# Patient Record
Sex: MALE | Race: WHITE | NOT HISPANIC OR LATINO | Employment: OTHER | URBAN - METROPOLITAN AREA
[De-identification: names, ages, dates, MRNs, and addresses within clinical notes are randomized per-mention and may not be internally consistent; named-entity substitution may affect disease eponyms.]

---

## 2017-02-22 ENCOUNTER — TELEPHONE (OUTPATIENT)
Dept: CARDIOLOGY | Facility: MEDICAL CENTER | Age: 62
End: 2017-02-22

## 2017-02-22 DIAGNOSIS — E78.5 DYSLIPIDEMIA: ICD-10-CM

## 2017-02-22 DIAGNOSIS — I10 ESSENTIAL HYPERTENSION, BENIGN: ICD-10-CM

## 2017-02-22 NOTE — TELEPHONE ENCOUNTER
----- Message from Eliza Rodriguez, Med Ass't sent at 2/21/2017 11:28 AM PST -----  Regarding: Lab order Request   Contact: [phone]  Patient of AM would like lab order sent prior to appointment on 3/9/17.    Thank you

## 2017-02-22 NOTE — TELEPHONE ENCOUNTER
----- Message from Eliza Rodriguez, Med Ass't sent at 2/21/2017 11:28 AM PST -----  Regarding: Lab order Request   Contact: [phone]  Patient of AM would like lab order sent prior to appointment on 3/9/17.    Thank you

## 2017-03-06 ENCOUNTER — HOSPITAL ENCOUNTER (OUTPATIENT)
Dept: LAB | Facility: MEDICAL CENTER | Age: 62
End: 2017-03-06
Attending: INTERNAL MEDICINE
Payer: COMMERCIAL

## 2017-03-06 DIAGNOSIS — E78.5 DYSLIPIDEMIA: ICD-10-CM

## 2017-03-06 DIAGNOSIS — I10 ESSENTIAL HYPERTENSION, BENIGN: ICD-10-CM

## 2017-03-06 LAB
ALBUMIN SERPL BCP-MCNC: 3.8 G/DL (ref 3.2–4.9)
ALBUMIN/GLOB SERPL: 1.3 G/DL
ALP SERPL-CCNC: 80 U/L (ref 30–99)
ALT SERPL-CCNC: 34 U/L (ref 2–50)
ANION GAP SERPL CALC-SCNC: 8 MMOL/L (ref 0–11.9)
AST SERPL-CCNC: 35 U/L (ref 12–45)
BILIRUB SERPL-MCNC: 1.1 MG/DL (ref 0.1–1.5)
BUN SERPL-MCNC: 17 MG/DL (ref 8–22)
CALCIUM SERPL-MCNC: 9.3 MG/DL (ref 8.4–10.2)
CHLORIDE SERPL-SCNC: 99 MMOL/L (ref 96–112)
CHOLEST SERPL-MCNC: 192 MG/DL (ref 100–199)
CO2 SERPL-SCNC: 31 MMOL/L (ref 20–33)
CREAT SERPL-MCNC: 0.92 MG/DL (ref 0.5–1.4)
GFR SERPL CREATININE-BSD FRML MDRD: >60 ML/MIN/1.73 M 2
GLOBULIN SER CALC-MCNC: 2.9 G/DL (ref 1.9–3.5)
GLUCOSE SERPL-MCNC: 124 MG/DL (ref 65–99)
HDLC SERPL-MCNC: 45 MG/DL
LDLC SERPL CALC-MCNC: 119 MG/DL
POTASSIUM SERPL-SCNC: 3.9 MMOL/L (ref 3.6–5.5)
PROT SERPL-MCNC: 6.7 G/DL (ref 6–8.2)
SODIUM SERPL-SCNC: 138 MMOL/L (ref 135–145)
TRIGL SERPL-MCNC: 142 MG/DL (ref 0–149)

## 2017-03-06 PROCEDURE — 36415 COLL VENOUS BLD VENIPUNCTURE: CPT

## 2017-03-06 PROCEDURE — 80061 LIPID PANEL: CPT

## 2017-03-06 PROCEDURE — 80053 COMPREHEN METABOLIC PANEL: CPT

## 2017-03-09 DIAGNOSIS — I10 ESSENTIAL HYPERTENSION, BENIGN: ICD-10-CM

## 2017-03-09 DIAGNOSIS — R55 SYNCOPE AND COLLAPSE: ICD-10-CM

## 2017-03-09 DIAGNOSIS — I77.810 ASCENDING AORTA DILATATION (HCC): ICD-10-CM

## 2017-03-24 ENCOUNTER — HOSPITAL ENCOUNTER (OUTPATIENT)
Dept: CARDIOLOGY | Facility: MEDICAL CENTER | Age: 62
End: 2017-03-24
Attending: INTERNAL MEDICINE
Payer: COMMERCIAL

## 2017-03-24 DIAGNOSIS — I77.810 ASCENDING AORTA DILATATION (HCC): ICD-10-CM

## 2017-03-24 LAB
LV EJECT FRACT  99904: 55
LV EJECT FRACT MOD 2C 99903: 54.09
LV EJECT FRACT MOD 4C 99902: 56.98
LV EJECT FRACT MOD BP 99901: 51.26

## 2017-03-24 PROCEDURE — 93306 TTE W/DOPPLER COMPLETE: CPT | Mod: 26 | Performed by: INTERNAL MEDICINE

## 2017-03-24 PROCEDURE — 93306 TTE W/DOPPLER COMPLETE: CPT

## 2017-05-26 ENCOUNTER — HOSPITAL ENCOUNTER (OUTPATIENT)
Dept: LAB | Facility: MEDICAL CENTER | Age: 62
End: 2017-05-26
Attending: INTERNAL MEDICINE
Payer: COMMERCIAL

## 2017-05-26 ENCOUNTER — OFFICE VISIT (OUTPATIENT)
Dept: CARDIOLOGY | Facility: MEDICAL CENTER | Age: 62
End: 2017-05-26
Payer: COMMERCIAL

## 2017-05-26 VITALS
HEART RATE: 62 BPM | WEIGHT: 229 LBS | OXYGEN SATURATION: 96 % | BODY MASS INDEX: 30.35 KG/M2 | DIASTOLIC BLOOD PRESSURE: 78 MMHG | HEIGHT: 73 IN | SYSTOLIC BLOOD PRESSURE: 140 MMHG

## 2017-05-26 DIAGNOSIS — R73.09 ELEVATED GLUCOSE: ICD-10-CM

## 2017-05-26 DIAGNOSIS — E78.5 DYSLIPIDEMIA: ICD-10-CM

## 2017-05-26 DIAGNOSIS — I10 ESSENTIAL HYPERTENSION, BENIGN: ICD-10-CM

## 2017-05-26 PROCEDURE — 99214 OFFICE O/P EST MOD 30 MIN: CPT | Performed by: INTERNAL MEDICINE

## 2017-05-26 PROCEDURE — 36415 COLL VENOUS BLD VENIPUNCTURE: CPT

## 2017-05-26 PROCEDURE — 83036 HEMOGLOBIN GLYCOSYLATED A1C: CPT

## 2017-05-26 RX ORDER — AMLODIPINE BESYLATE 10 MG/1
10 TABLET ORAL DAILY
Qty: 30 TAB | Refills: 11 | Status: SHIPPED | OUTPATIENT
Start: 2017-05-26 | End: 2017-09-06

## 2017-05-26 ASSESSMENT — ENCOUNTER SYMPTOMS
ORTHOPNEA: 0
LOSS OF CONSCIOUSNESS: 0
SHORTNESS OF BREATH: 0
BLURRED VISION: 0
MYALGIAS: 0
DEPRESSION: 0
PND: 0
NERVOUS/ANXIOUS: 0
BRUISES/BLEEDS EASILY: 0
PALPITATIONS: 0
SPEECH CHANGE: 0
ABDOMINAL PAIN: 0
CLAUDICATION: 0
FEVER: 0

## 2017-05-26 NOTE — MR AVS SNAPSHOT
"        Sanya Miranda   2017 2:00 PM   Office Visit   MRN: [id number]    Department:  [facility]   Dept Phone:  [phone]    Description:  Male : 1955   Provider:  Anabel Espinal M.D.           Reason for Visit     Follow-Up           Allergies as of 2017     Allergen Noted Reactions    Sulfa Drugs 10/29/2008   Hives      You were diagnosed with     Essential hypertension, benign   [401.1.ICD-9-CM]       Elevated glucose   [891063]       Dyslipidemia   [516304]         Vital Signs     Blood Pressure Pulse Height Weight Body Mass Index Oxygen Saturation    140/78 mmHg 62 1.854 m (6' 1\") 103.874 kg (229 lb) 30.22 kg/m2 96%    Smoking Status                   Never Smoker            Basic Information     Date Of Birth Sex Race Ethnicity Preferred Language    1955 Male White Non- English      Problem List              ICD-10-CM Priority Class Noted - Resolved    Essential hypertension, benign I10   2016 - Present    Syncope and collapse R55   3/9/2017 - Present      Health Maintenance        Date Due Completion Dates    IMM DTaP/Tdap/Td Vaccine (1 - Tdap) 1974 ---    COLONOSCOPY 2005 ---    IMM ZOSTER VACCINE 2015 ---            Current Immunizations     Influenza Vaccine Quad Inj (Pf) 2016  9:21 AM, 10/19/2015 10:06 AM    Influenza Vaccine Quad Inj (Preserved) 10/23/2014      Below and/or attached are the medications your provider expects you to take. Review all of your home medications and newly ordered medications with your provider and/or pharmacist. Follow medication instructions as directed by your provider and/or pharmacist. Please keep your medication list with you and share with your provider. Update the information when medications are discontinued, doses are changed, or new medications (including over-the-counter products) are added; and carry medication information at all times in the event of emergency situations     Allergies:  SULFA DRUGS - " Hives               Medications  Valid as of: May 26, 2017 -  2:39 PM    Generic Name Brand Name Tablet Size Instructions for use    AmLODIPine Besylate (Tab) NORVASC 10 MG Take 1 Tab by mouth every day.        Aspirin (Tab) aspirin 81 MG Take 81 mg by mouth every day.        HydroCHLOROthiazide (Tab) HYDRODIURIL 25 MG Take 1 Tab by mouth every day. Needs to be seen for further refills. Thank you        Niacin (Antihyperlipidemic) (Tab CR) NIASPAN 1000 MG Take 1000 mg by mouth 2 Times a Day.         Potassium Chloride (Tab CR) KLOR-CON 10 MEQ Take 1 Tab by mouth 2 times a day.        Ramipril (Cap) ALTACE 10 MG Take 10 mg by mouth 2 Times a Day.        .                 Medicines prescribed today were sent to:     [address] [facility]    [address] [facility] [address]    Phone: [phone] Fax: [phone]    Open 24 Hours?: No      Medication refill instructions:       If your prescription bottle indicates you have medication refills left, it is not necessary to call your provider’s office. Please contact your pharmacy and they will refill your medication.    If your prescription bottle indicates you do not have any refills left, you may request refills at any time through one of the following ways: The online Page2Images system (except Urgent Care), by calling your provider’s office, or by asking your pharmacy to contact your provider’s office with a refill request. Medication refills are processed only during regular business hours and may not be available until the next business day. Your provider may request additional information or to have a follow-up visit with you prior to refilling your medication.   *Please Note: Medication refills are assigned a new Rx number when refilled electronically. Your pharmacy may indicate that no refills were authorized even though a new prescription for the same medication is available at the pharmacy. Please request the medicine by  name with the pharmacy before contacting your provider for a refill.        Your To Do List     Future Labs/Procedures Complete By Expires    COMP METABOLIC PANEL  As directed 5/26/2018    HEMOGLOBIN A1C (Glycohemoglobin GHB Total/A1C with MBG Estimate)  As directed 5/26/2018    LIPID PROFILE  As directed 5/26/2018         BabyBus Access Code: [email or web address]  Expires: 6/2/2017 12:58 PM    BabyBus  A secure, online tool to manage your health information     [employer]’s BabyBus® is a secure, online tool that connects you to your personalized health information from the privacy of your home -- day or night - making it very easy for you to manage your healthcare. Once the activation process is completed, you can even access your medical information using the BabyBus lauren, which is available for free in the Apple Lauren store or Google Play store.     BabyBus provides the following levels of access (as shown below):   My Chart Features   [facility] Primary Care Doctor [facility]  Specialists [facility]  Urgent  [facility]  Primary Care  Doctor   Email your healthcare team securely and privately 24/7 X X X    Manage appointments: schedule your next appointment; view details of past/upcoming appointments X      Request prescription refills. X      View recent personal medical records, including lab and immunizations X X X X   View health record, including health history, allergies, medications X X X X   Read reports about your outpatient visits, procedures, consult and ER notes X X X X   See your discharge summary, which is a recap of your hospital and/or ER visit that includes your diagnosis, lab results, and care plan. X X       How to register for BabyBus:  1. Go to  [email or web address].  2. Click on the Sign Up Now box, which takes you to the New Member Sign Up page. You will need to provide the following information:  a. Enter your BabyBus Access Code exactly as it appears at the top of this page. (You will not  need to use this code after you’ve completed the sign-up process. If you do not sign up before the expiration date, you must request a new code.)   b. Enter your date of birth.   c. Enter your home email address.   d. Click Submit, and follow the next screen’s instructions.  3. Create a [email or web address] ID. This will be your [email or web address] login ID and cannot be changed, so think of one that is secure and easy to remember.  4. Create a [employer] password. You can change your password at any time.  5. Enter your Password Reset Question and Answer. This can be used at a later time if you forget your password.   6. Enter your e-mail address. This allows you to receive e-mail notifications when new information is available in [employer].  7. Click Sign Up. You can now view your health information.    For assistance activating your [employer] account, call [phone]

## 2017-05-26 NOTE — PROGRESS NOTES
Subjective:   Sanya Miranda is a pleasant 60-year-old gentleman with known history of dyslipidemia, hypertension presenting today for follow-up evaluation of hypertension.    She still continues to swim denied any further episodes of syncope. Patient wants to consolidates his medications. Denied any complaints of exertional chest pain, pressure or tightness. No complaints of palpitations orthopnea or PND. Denied any complaints of lower extremity edema or claudication.  Past Medical History   Diagnosis Date   • Hypertension      History reviewed. No pertinent past surgical history.  Family History   Problem Relation Age of Onset   • Heart Disease Mother      CABG at age late 60   • Hypertension Mother    • Heart Disease Father      CABG at age 83   • Hypertension Father      History   Smoking status   • Never Smoker    Smokeless tobacco   • Never Used     Allergies   Allergen Reactions   • Sulfa Drugs Hives     Outpatient Encounter Prescriptions as of 5/26/2017   Medication Sig Dispense Refill   • hydrochlorothiazide (HYDRODIURIL) 25 MG Tab Take 1 Tab by mouth every day. Needs to be seen for further refills. Thank you 30 Tab 11   • potassium chloride ER (KLOR-CON) 10 MEQ tablet Take 1 Tab by mouth 2 times a day. 60 Tab 11   • ramipril (ALTACE) 10 MG capsule Take 10 mg by mouth 2 Times a Day.     • aspirin 81 MG tablet Take 81 mg by mouth every day.     • NIASPAN 1000 MG PO TBCR Take 1000 mg by mouth 2 Times a Day.        No facility-administered encounter medications on file as of 5/26/2017.     Review of Systems   Constitutional: Negative for fever.   HENT: Negative for congestion.    Eyes: Negative for blurred vision.   Respiratory: Negative for shortness of breath.    Cardiovascular: Negative for chest pain, palpitations, orthopnea, claudication, leg swelling and PND.   Gastrointestinal: Negative for abdominal pain.   Musculoskeletal: Negative for myalgias and joint pain.   Skin: Negative for rash.  "  Neurological: Negative for speech change and loss of consciousness.   Endo/Heme/Allergies: Does not bruise/bleed easily.   Psychiatric/Behavioral: Negative for depression. The patient is not nervous/anxious.    All other systems reviewed and are negative.       Objective:   /78 mmHg  Pulse 62  Ht 1.854 m (6' 1\")  Wt 103.874 kg (229 lb)  BMI 30.22 kg/m2  SpO2 96%    Physical Exam   Constitutional: He is oriented to person, place, and time. He appears well-developed. No distress.   HENT:   Mouth/Throat: Mucous membranes are normal.   Eyes: Conjunctivae and EOM are normal.   Neck: No JVD present. No tracheal deviation present. No thyroid mass present.   Cardiovascular: Normal rate and regular rhythm.    No murmur heard.  Pulses:       Carotid pulses are 2+ on the right side, and 2+ on the left side.       Radial pulses are 2+ on the right side, and 2+ on the left side.        Dorsalis pedis pulses are 2+ on the right side, and 2+ on the left side.        Posterior tibial pulses are 2+ on the right side, and 2+ on the left side.   Pulmonary/Chest: Effort normal and breath sounds normal. No respiratory distress. He exhibits no tenderness.   Abdominal: Soft. There is no tenderness.   Musculoskeletal: Normal range of motion. He exhibits no edema.   Neurological: He is alert and oriented to person, place, and time. He has normal strength. He displays no tremor.   Skin: Skin is warm and dry. He is not diaphoretic.   Psychiatric: He has a normal mood and affect. His behavior is normal.   Vitals reviewed.     Transthoracic echo: 3/24/17  Normal left ventricular chamber size. Mild left ventricular septal hypertrophy. Left ventricular ejection fraction is visually estimated to be 55%.  Grade I diastolic dysfunction.  Normal right ventricular size and systolic function.  Prolapse of the posterior mitral leaflet was present.  Trace tricuspid regurgitation.  Normal pericardium without effusion.  Dilated ascending aorta " at 3.9 cm    Results for MAGO ARMAS (MRN [id number]) as of 5/26/2017 16:53   2/17/2016  3/6/2017    Sodium 143 138   Potassium 3.9 3.9   Chloride 100 99   Co2 28 31   Anion Gap  8.0   Glucose 107 (H) 124 (H)   Bun 18 17   Creatinine 0.84 0.92   GFR If Non African American 95 >60   Bun-Creatinine Ratio 21    Calcium 9.3 9.3   AST(SGOT)  35   ALT(SGPT)  34   Alkaline Phosphatase  80   Cholesterol,Tot  192   Triglycerides  142   HDL  45   LDL  119 (H)     Event monitor: 1/1/16   No significant arrhythmias pauses noted underlying rhythm was sinus.    Stress echo: 12/17/15  Negative stress echocardiogram.  No evidence of ischemia or infarct.  Normal resting left ventricular systolic function with ejection   fraction of 60%.  Fair exercise tolerance.  Resting EKG shows normal sinus rhythm with incomplete right bundle   branch block.  Non-specific ST changes with stress.  No arrhythmias noted    TTE: 12/10/15  No prior study is available for comparison.   Left ventricular ejection fraction is visually estimated to be 65%.   Normal regional wall motion. Grade I diastolic dysfunction.  No significant valve abnormalities.   Unable to estimate pulmonary artery pressure due to an inadequate   tricuspid regurgitant jet.   Ascending aorta diameter is 3.7 cm    EKG: 10/1/15  Sinus rhythm, right bundle-branch block    Labs: 11/24/15  Glucose 113, BUN 20, creatinine 0.92, sodium 141, potassium 4.1, chloride 100, bicarbonate 26  Gómez is 94, AST 23, ALT 22  Assessment:     1. Dyslipidemia  2. Hypertension  3. Borderline dilated ascending aorta    Medical Decision Making:  Today's Assessment / Status / Plan:     1. Dietary modification and exercise.  2. Per patient request will try to consolidate antihypertensive agents.  Continue ramipril 10 mg daily.  Discontinue hydrochlorothiazide and potassium chloride.  Start Norvasc 5 mg daily if systolic blood pressure more than 140 increase Norvasc to 10 mg daily.  If patient develops  lower extremity edema while on Norvasc advised him to discontinue Norvasc and go back to hydrochlorothiazide and potassium chloride.  3. Repeat echocardiogram in 2 years.    Follow-up in one year.  Labs prior to next visit.    Thank you for allowing me to participate in taking care of patient.    Anabel Saenz MD.

## 2017-05-26 NOTE — Clinical Note
[facility]   [address]   [address]  [place] [address]  Phone: [phone]  Fax: [phone]              Sanya Miranda  1955    Encounter Date: 5/26/2017    Anabel Espinal M.D.          PROGRESS NOTE:  Subjective:   Sanya Miranda is a pleasant 60-year-old gentleman with known history of dyslipidemia, hypertension presenting today for follow-up evaluation of hypertension.    She still continues to swim denied any further episodes of syncope. Patient wants to consolidates his medications. Denied any complaints of exertional chest pain, pressure or tightness. No complaints of palpitations orthopnea or PND. Denied any complaints of lower extremity edema or claudication.  Past Medical History   Diagnosis Date   • Hypertension      History reviewed. No pertinent past surgical history.  Family History   Problem Relation Age of Onset   • Heart Disease Mother      CABG at age late 60   • Hypertension Mother    • Heart Disease Father      CABG at age 83   • Hypertension Father      History   Smoking status   • Never Smoker    Smokeless tobacco   • Never Used     Allergies   Allergen Reactions   • Sulfa Drugs Hives     Outpatient Encounter Prescriptions as of 5/26/2017   Medication Sig Dispense Refill   • hydrochlorothiazide (HYDRODIURIL) 25 MG Tab Take 1 Tab by mouth every day. Needs to be seen for further refills. Thank you 30 Tab 11   • potassium chloride ER (KLOR-CON) 10 MEQ tablet Take 1 Tab by mouth 2 times a day. 60 Tab 11   • ramipril (ALTACE) 10 MG capsule Take 10 mg by mouth 2 Times a Day.     • aspirin 81 MG tablet Take 81 mg by mouth every day.     • NIASPAN 1000 MG PO TBCR Take 1000 mg by mouth 2 Times a Day.        No facility-administered encounter medications on file as of 5/26/2017.     Review of Systems   Constitutional: Negative for fever.   HENT: Negative for congestion.    Eyes: Negative for blurred vision.    "  Respiratory: Negative for shortness of breath.    Cardiovascular: Negative for chest pain, palpitations, orthopnea, claudication, leg swelling and PND.   Gastrointestinal: Negative for abdominal pain.   Musculoskeletal: Negative for myalgias and joint pain.   Skin: Negative for rash.   Neurological: Negative for speech change and loss of consciousness.   Endo/Heme/Allergies: Does not bruise/bleed easily.   Psychiatric/Behavioral: Negative for depression. The patient is not nervous/anxious.    All other systems reviewed and are negative.       Objective:   /78 mmHg  Pulse 62  Ht 1.854 m (6' 1\")  Wt 103.874 kg (229 lb)  BMI 30.22 kg/m2  SpO2 96%    Physical Exam   Constitutional: He is oriented to person, place, and time. He appears well-developed. No distress.   HENT:   Mouth/Throat: Mucous membranes are normal.   Eyes: Conjunctivae and EOM are normal.   Neck: No JVD present. No tracheal deviation present. No thyroid mass present.   Cardiovascular: Normal rate and regular rhythm.    No murmur heard.  Pulses:       Carotid pulses are 2+ on the right side, and 2+ on the left side.       Radial pulses are 2+ on the right side, and 2+ on the left side.        Dorsalis pedis pulses are 2+ on the right side, and 2+ on the left side.        Posterior tibial pulses are 2+ on the right side, and 2+ on the left side.   Pulmonary/Chest: Effort normal and breath sounds normal. No respiratory distress. He exhibits no tenderness.   Abdominal: Soft. There is no tenderness.   Musculoskeletal: Normal range of motion. He exhibits no edema.   Neurological: He is alert and oriented to person, place, and time. He has normal strength. He displays no tremor.   Skin: Skin is warm and dry. He is not diaphoretic.   Psychiatric: He has a normal mood and affect. His behavior is normal.   Vitals reviewed.     Transthoracic echo: 3/24/17  Normal left ventricular chamber size. Mild left ventricular septal hypertrophy. Left ventricular " ejection fraction is visually estimated to be 55%.  Grade I diastolic dysfunction.  Normal right ventricular size and systolic function.  Prolapse of the posterior mitral leaflet was present.  Trace tricuspid regurgitation.  Normal pericardium without effusion.  Dilated ascending aorta at 3.9 cm    Results for MAGO ARMAS (MRN [id number]) as of 5/26/2017 16:53   2/17/2016  3/6/2017    Sodium 143 138   Potassium 3.9 3.9   Chloride 100 99   Co2 28 31   Anion Gap  8.0   Glucose 107 (H) 124 (H)   Bun 18 17   Creatinine 0.84 0.92   GFR If Non African American 95 >60   Bun-Creatinine Ratio 21    Calcium 9.3 9.3   AST(SGOT)  35   ALT(SGPT)  34   Alkaline Phosphatase  80   Cholesterol,Tot  192   Triglycerides  142   HDL  45   LDL  119 (H)     Event monitor: 1/1/16   No significant arrhythmias pauses noted underlying rhythm was sinus.    Stress echo: 12/17/15  Negative stress echocardiogram.  No evidence of ischemia or infarct.  Normal resting left ventricular systolic function with ejection   fraction of 60%.  Fair exercise tolerance.  Resting EKG shows normal sinus rhythm with incomplete right bundle   branch block.  Non-specific ST changes with stress.  No arrhythmias noted    TTE: 12/10/15  No prior study is available for comparison.   Left ventricular ejection fraction is visually estimated to be 65%.   Normal regional wall motion. Grade I diastolic dysfunction.  No significant valve abnormalities.   Unable to estimate pulmonary artery pressure due to an inadequate   tricuspid regurgitant jet.   Ascending aorta diameter is 3.7 cm    EKG: 10/1/15  Sinus rhythm, right bundle-branch block    Labs: 11/24/15  Glucose 113, BUN 20, creatinine 0.92, sodium 141, potassium 4.1, chloride 100, bicarbonate 26  Gómez is 94, AST 23, ALT 22  Assessment:     1. Dyslipidemia  2. Hypertension  3. Borderline dilated ascending aorta    Medical Decision Making:  Today's Assessment / Status / Plan:     1. Dietary modification and  exercise.  2. Per patient request will try to consolidate antihypertensive agents.  Continue ramipril 10 mg daily.  Discontinue hydrochlorothiazide and potassium chloride.  Start Norvasc 5 mg daily if systolic blood pressure more than 140 increase Norvasc to 10 mg daily.  If patient develops lower extremity edema while on Norvasc advised him to discontinue Norvasc and go back to hydrochlorothiazide and potassium chloride.  3. Repeat echocardiogram in 2 years.    Follow-up in one year.  Labs prior to next visit.    Thank you for allowing me to participate in taking care of patient.    Anabel Saenz MD.      Aurelio ASHFORD M.D.  [address] [facility] [address]  VIA Facsimile: [phone]

## 2017-05-27 LAB
EST. AVERAGE GLUCOSE BLD GHB EST-MCNC: 114 MG/DL
HBA1C MFR BLD: 5.6 % (ref 0–5.6)

## 2017-06-12 ENCOUNTER — TELEPHONE (OUTPATIENT)
Dept: CARDIOLOGY | Facility: MEDICAL CENTER | Age: 62
End: 2017-06-12

## 2017-06-12 NOTE — TELEPHONE ENCOUNTER
----- Message from Bety Bonner L.P.N. sent at 6/12/2017 11:19 AM PDT -----      ----- Message -----     From: Anabel Espinal M.D.     Sent: 6/12/2017   6:57 AM       To: Nicolette Jackson R.N.    Hemoglobin A1c is 5.6 right at the cut off  ----- Message -----     From: Nicolette Jackson R.N.     Sent: 5/31/2017  11:14 AM       To: Anabel Espinal M.D.    Saw in office 5/26, no FU scheduled

## 2017-06-20 ENCOUNTER — TELEPHONE (OUTPATIENT)
Dept: CARDIOLOGY | Facility: MEDICAL CENTER | Age: 62
End: 2017-06-20

## 2017-06-20 NOTE — TELEPHONE ENCOUNTER
----- Message from Anabel Espinal M.D. sent at 6/20/2017 11:36 AM PDT -----  Regarding: RE: patient having swelling issues from Amlodipine  Yes  Anabel  ----- Message -----     From: Wicho Decker     Sent: 6/20/2017  10:45 AM       To: Anabel Espinal M.D.  Subject: RE: patient having swelling issues from Amlo#    I left this patient a message because he should actually be taking his ramipril based on your last note and he was to stop the HCTZ and only restart if the amlodipine caused swelling so I just need him to clarify what he is and isn't taking.  If he is taking ramipril I will have him go back on the HCTZ with potassium and have him stop the amlodipine - that ok?     ----- Message -----     From: Homa Patino     Sent: 6/19/2017   4:33 PM       To: Wicho Decker  Subject: patient having swelling issues from Amlodipi#    AM/Sailajaezy      Patient says his ankles are swelling from the Amlodipine, and he wants to go back on the Ramipril. He's going out of town on Wednesday and would like to get this taken care of before he leaves town. He can be reached at [phone].

## 2017-06-20 NOTE — TELEPHONE ENCOUNTER
Spoke with patient who states he has been having BLE swelling since last week that just progressively got worse over the weekend.  Patient misunderstood instructions from last appointment and stopped his ramipril as well as HCTZ and was only taking amlodipine.  He stopped amlodipine yesterday and restarted both ramipril and HCTZ and I asked him to call me Friday morning and let me know how he is feeling.  He is also checking his BP and said he has had no issues with it in terms of fluctuating too high or too low but did not have actual numbers.  He will call back before Friday if any problems arise or he has any other questions/concerns. AM aware of situation

## 2017-08-15 DIAGNOSIS — I10 ESSENTIAL HYPERTENSION: ICD-10-CM

## 2017-08-15 RX ORDER — POTASSIUM CHLORIDE 750 MG/1
TABLET, FILM COATED, EXTENDED RELEASE ORAL
Qty: 60 TAB | Refills: 11 | Status: SHIPPED | OUTPATIENT
Start: 2017-08-15 | End: 2018-09-10 | Stop reason: SDUPTHER

## 2017-09-06 ENCOUNTER — HOSPITAL ENCOUNTER (INPATIENT)
Facility: MEDICAL CENTER | Age: 62
LOS: 1 days | DRG: 242 | End: 2017-09-07
Attending: EMERGENCY MEDICINE | Admitting: INTERNAL MEDICINE
Payer: COMMERCIAL

## 2017-09-06 ENCOUNTER — RESOLUTE PROFESSIONAL BILLING HOSPITAL PROF FEE (OUTPATIENT)
Dept: HOSPITALIST | Facility: MEDICAL CENTER | Age: 62
End: 2017-09-06
Payer: COMMERCIAL

## 2017-09-06 ENCOUNTER — APPOINTMENT (OUTPATIENT)
Dept: RADIOLOGY | Facility: MEDICAL CENTER | Age: 62
DRG: 242 | End: 2017-09-06
Attending: INTERNAL MEDICINE
Payer: COMMERCIAL

## 2017-09-06 ENCOUNTER — APPOINTMENT (OUTPATIENT)
Dept: RADIOLOGY | Facility: MEDICAL CENTER | Age: 62
DRG: 242 | End: 2017-09-06
Attending: EMERGENCY MEDICINE
Payer: COMMERCIAL

## 2017-09-06 DIAGNOSIS — I45.9 HEART BLOCK: ICD-10-CM

## 2017-09-06 PROBLEM — R73.9 HYPERGLYCEMIA: Status: ACTIVE | Noted: 2017-09-06

## 2017-09-06 PROBLEM — I44.2 AV BLOCK, 3RD DEGREE (HCC): Status: ACTIVE | Noted: 2017-09-06

## 2017-09-06 PROBLEM — J96.01 ACUTE RESPIRATORY FAILURE WITH HYPOXIA (HCC): Status: ACTIVE | Noted: 2017-09-06

## 2017-09-06 PROBLEM — E87.6 HYPOKALEMIA: Status: ACTIVE | Noted: 2017-09-06

## 2017-09-06 LAB
ALBUMIN SERPL BCP-MCNC: 3.6 G/DL (ref 3.2–4.9)
ALBUMIN/GLOB SERPL: 1.5 G/DL
ALP SERPL-CCNC: 68 U/L (ref 30–99)
ALT SERPL-CCNC: 23 U/L (ref 2–50)
ANION GAP SERPL CALC-SCNC: 8 MMOL/L (ref 0–11.9)
APTT PPP: 25.8 SEC (ref 24.7–36)
AST SERPL-CCNC: 33 U/L (ref 12–45)
BASE EXCESS BLDA CALC-SCNC: 1 MMOL/L (ref -4–3)
BASOPHILS # BLD AUTO: 0.1 % (ref 0–1.8)
BASOPHILS # BLD: 0.01 K/UL (ref 0–0.12)
BILIRUB SERPL-MCNC: 1 MG/DL (ref 0.1–1.5)
BNP SERPL-MCNC: 18 PG/ML (ref 0–100)
BODY TEMPERATURE: ABNORMAL DEGREES
BUN SERPL-MCNC: 16 MG/DL (ref 8–22)
CALCIUM SERPL-MCNC: 8.7 MG/DL (ref 8.5–10.5)
CHLORIDE SERPL-SCNC: 106 MMOL/L (ref 96–112)
CO2 BLDA-SCNC: 28 MMOL/L (ref 20–33)
CO2 SERPL-SCNC: 24 MMOL/L (ref 20–33)
CREAT SERPL-MCNC: 0.79 MG/DL (ref 0.5–1.4)
EKG IMPRESSION: NORMAL
EKG IMPRESSION: NORMAL
EOSINOPHIL # BLD AUTO: 0.01 K/UL (ref 0–0.51)
EOSINOPHIL NFR BLD: 0.1 % (ref 0–6.9)
ERYTHROCYTE [DISTWIDTH] IN BLOOD BY AUTOMATED COUNT: 38.8 FL (ref 35.9–50)
GFR SERPL CREATININE-BSD FRML MDRD: >60 ML/MIN/1.73 M 2
GLOBULIN SER CALC-MCNC: 2.4 G/DL (ref 1.9–3.5)
GLUCOSE BLD-MCNC: 105 MG/DL (ref 65–99)
GLUCOSE BLD-MCNC: 121 MG/DL (ref 65–99)
GLUCOSE BLD-MCNC: 125 MG/DL (ref 65–99)
GLUCOSE SERPL-MCNC: 150 MG/DL (ref 65–99)
GRAM STN SPEC: NORMAL
HCO3 BLDA-SCNC: 27 MMOL/L (ref 17–25)
HCT VFR BLD AUTO: 46 % (ref 42–52)
HGB BLD-MCNC: 16.5 G/DL (ref 14–18)
IMM GRANULOCYTES # BLD AUTO: 0.05 K/UL (ref 0–0.11)
IMM GRANULOCYTES NFR BLD AUTO: 0.5 % (ref 0–0.9)
INR PPP: 1.03 (ref 0.87–1.13)
LYMPHOCYTES # BLD AUTO: 0.65 K/UL (ref 1–4.8)
LYMPHOCYTES NFR BLD: 6.8 % (ref 22–41)
MCH RBC QN AUTO: 30.8 PG (ref 27–33)
MCHC RBC AUTO-ENTMCNC: 35.9 G/DL (ref 33.7–35.3)
MCV RBC AUTO: 86 FL (ref 81.4–97.8)
MONOCYTES # BLD AUTO: 0.21 K/UL (ref 0–0.85)
MONOCYTES NFR BLD AUTO: 2.2 % (ref 0–13.4)
NEUTROPHILS # BLD AUTO: 8.58 K/UL (ref 1.82–7.42)
NEUTROPHILS NFR BLD: 90.3 % (ref 44–72)
NRBC # BLD AUTO: 0 K/UL
NRBC BLD AUTO-RTO: 0 /100 WBC
O2/TOTAL GAS SETTING VFR VENT: 40 %
PCO2 BLDA: 45.9 MMHG (ref 26–37)
PH BLDA: 7.38 [PH] (ref 7.4–7.5)
PLATELET # BLD AUTO: 198 K/UL (ref 164–446)
PMV BLD AUTO: 10.1 FL (ref 9–12.9)
PO2 BLDA: 74 MMHG (ref 64–87)
POTASSIUM SERPL-SCNC: 3.3 MMOL/L (ref 3.6–5.5)
PROT SERPL-MCNC: 6 G/DL (ref 6–8.2)
PROTHROMBIN TIME: 13.8 SEC (ref 12–14.6)
RBC # BLD AUTO: 5.35 M/UL (ref 4.7–6.1)
RHODAMINE-AURAMINE STN SPEC: NORMAL
SAO2 % BLDA: 94 % (ref 93–99)
SIGNIFICANT IND 70042: NORMAL
SIGNIFICANT IND 70042: NORMAL
SITE SITE: NORMAL
SITE SITE: NORMAL
SODIUM SERPL-SCNC: 138 MMOL/L (ref 135–145)
SOURCE SOURCE: NORMAL
SOURCE SOURCE: NORMAL
SPECIMEN DRAWN FROM PATIENT: ABNORMAL
TROPONIN I SERPL-MCNC: 0.05 NG/ML (ref 0–0.04)
WBC # BLD AUTO: 9.5 K/UL (ref 4.8–10.8)

## 2017-09-06 PROCEDURE — 99152 MOD SED SAME PHYS/QHP 5/>YRS: CPT

## 2017-09-06 PROCEDURE — 87116 MYCOBACTERIA CULTURE: CPT

## 2017-09-06 PROCEDURE — 71010 DX-CHEST-PORTABLE (1 VIEW): CPT

## 2017-09-06 PROCEDURE — 82962 GLUCOSE BLOOD TEST: CPT | Mod: 91

## 2017-09-06 PROCEDURE — 99291 CRITICAL CARE FIRST HOUR: CPT | Performed by: INTERNAL MEDICINE

## 2017-09-06 PROCEDURE — 82803 BLOOD GASES ANY COMBINATION: CPT

## 2017-09-06 PROCEDURE — 5A1935Z RESPIRATORY VENTILATION, LESS THAN 24 CONSECUTIVE HOURS: ICD-10-PCS | Performed by: HOSPITALIST

## 2017-09-06 PROCEDURE — 87205 SMEAR GRAM STAIN: CPT

## 2017-09-06 PROCEDURE — 36415 COLL VENOUS BLD VENIPUNCTURE: CPT

## 2017-09-06 PROCEDURE — A9270 NON-COVERED ITEM OR SERVICE: HCPCS | Performed by: INTERNAL MEDICINE

## 2017-09-06 PROCEDURE — 96368 THER/DIAG CONCURRENT INF: CPT

## 2017-09-06 PROCEDURE — 02HK3JZ INSERTION OF PACEMAKER LEAD INTO RIGHT VENTRICLE, PERCUTANEOUS APPROACH: ICD-10-PCS | Performed by: INTERNAL MEDICINE

## 2017-09-06 PROCEDURE — 93005 ELECTROCARDIOGRAM TRACING: CPT | Performed by: EMERGENCY MEDICINE

## 2017-09-06 PROCEDURE — 700117 HCHG RX CONTRAST REV CODE 255: Performed by: INTERNAL MEDICINE

## 2017-09-06 PROCEDURE — 303105 HCHG CATHETER EXTRA

## 2017-09-06 PROCEDURE — C1894 INTRO/SHEATH, NON-LASER: HCPCS

## 2017-09-06 PROCEDURE — 302978 HCHG BRONCHOSCOPY-DIAGNOSTIC

## 2017-09-06 PROCEDURE — 94760 N-INVAS EAR/PLS OXIMETRY 1: CPT

## 2017-09-06 PROCEDURE — 99291 CRITICAL CARE FIRST HOUR: CPT

## 2017-09-06 PROCEDURE — 93010 ELECTROCARDIOGRAM REPORT: CPT | Performed by: INTERNAL MEDICINE

## 2017-09-06 PROCEDURE — 304853 HCHG PPM TEST CABLE

## 2017-09-06 PROCEDURE — 83880 ASSAY OF NATRIURETIC PEPTIDE: CPT

## 2017-09-06 PROCEDURE — 94150 VITAL CAPACITY TEST: CPT

## 2017-09-06 PROCEDURE — 0BCM8ZZ EXTIRPATION OF MATTER FROM BILATERAL LUNGS, VIA NATURAL OR ARTIFICIAL OPENING ENDOSCOPIC: ICD-10-PCS | Performed by: INTERNAL MEDICINE

## 2017-09-06 PROCEDURE — 85610 PROTHROMBIN TIME: CPT

## 2017-09-06 PROCEDURE — C1785 PMKR, DUAL, RATE-RESP: HCPCS

## 2017-09-06 PROCEDURE — 700111 HCHG RX REV CODE 636 W/ 250 OVERRIDE (IP)

## 2017-09-06 PROCEDURE — 700102 HCHG RX REV CODE 250 W/ 637 OVERRIDE(OP): Performed by: INTERNAL MEDICINE

## 2017-09-06 PROCEDURE — 02H63JZ INSERTION OF PACEMAKER LEAD INTO RIGHT ATRIUM, PERCUTANEOUS APPROACH: ICD-10-PCS | Performed by: INTERNAL MEDICINE

## 2017-09-06 PROCEDURE — C1898 LEAD, PMKR, OTHER THAN TRANS: HCPCS

## 2017-09-06 PROCEDURE — 94002 VENT MGMT INPAT INIT DAY: CPT

## 2017-09-06 PROCEDURE — 88112 CYTOPATH CELL ENHANCE TECH: CPT

## 2017-09-06 PROCEDURE — 96367 TX/PROPH/DG ADDL SEQ IV INF: CPT

## 2017-09-06 PROCEDURE — 305387 HCHG SUTURES

## 2017-09-06 PROCEDURE — 85730 THROMBOPLASTIN TIME PARTIAL: CPT

## 2017-09-06 PROCEDURE — 87070 CULTURE OTHR SPECIMN AEROBIC: CPT

## 2017-09-06 PROCEDURE — 93005 ELECTROCARDIOGRAM TRACING: CPT | Performed by: INTERNAL MEDICINE

## 2017-09-06 PROCEDURE — 96366 THER/PROPH/DIAG IV INF ADDON: CPT

## 2017-09-06 PROCEDURE — 85025 COMPLETE CBC W/AUTO DIFF WBC: CPT

## 2017-09-06 PROCEDURE — 96365 THER/PROPH/DIAG IV INF INIT: CPT

## 2017-09-06 PROCEDURE — 304952 HCHG R 2 PADS

## 2017-09-06 PROCEDURE — 51702 INSERT TEMP BLADDER CATH: CPT

## 2017-09-06 PROCEDURE — 80053 COMPREHEN METABOLIC PANEL: CPT

## 2017-09-06 PROCEDURE — 33208 INSRT HEART PM ATRIAL & VENT: CPT

## 2017-09-06 PROCEDURE — 700111 HCHG RX REV CODE 636 W/ 250 OVERRIDE (IP): Performed by: INTERNAL MEDICINE

## 2017-09-06 PROCEDURE — 87102 FUNGUS ISOLATION CULTURE: CPT

## 2017-09-06 PROCEDURE — 99153 MOD SED SAME PHYS/QHP EA: CPT

## 2017-09-06 PROCEDURE — 770022 HCHG ROOM/CARE - ICU (200)

## 2017-09-06 PROCEDURE — 87015 SPECIMEN INFECT AGNT CONCNTJ: CPT

## 2017-09-06 PROCEDURE — 0B9F8ZX DRAINAGE OF RIGHT LOWER LUNG LOBE, VIA NATURAL OR ARTIFICIAL OPENING ENDOSCOPIC, DIAGNOSTIC: ICD-10-PCS | Performed by: INTERNAL MEDICINE

## 2017-09-06 PROCEDURE — 36600 WITHDRAWAL OF ARTERIAL BLOOD: CPT

## 2017-09-06 PROCEDURE — 0JH606Z INSERTION OF PACEMAKER, DUAL CHAMBER INTO CHEST SUBCUTANEOUS TISSUE AND FASCIA, OPEN APPROACH: ICD-10-PCS | Performed by: INTERNAL MEDICINE

## 2017-09-06 PROCEDURE — 88305 TISSUE EXAM BY PATHOLOGIST: CPT

## 2017-09-06 PROCEDURE — 700105 HCHG RX REV CODE 258: Performed by: INTERNAL MEDICINE

## 2017-09-06 PROCEDURE — 87206 SMEAR FLUORESCENT/ACID STAI: CPT

## 2017-09-06 PROCEDURE — C1892 INTRO/SHEATH,FIXED,PEEL-AWAY: HCPCS

## 2017-09-06 PROCEDURE — 84484 ASSAY OF TROPONIN QUANT: CPT

## 2017-09-06 PROCEDURE — 700101 HCHG RX REV CODE 250

## 2017-09-06 RX ORDER — DEXTROSE MONOHYDRATE 25 G/50ML
25 INJECTION, SOLUTION INTRAVENOUS
Status: DISCONTINUED | OUTPATIENT
Start: 2017-09-06 | End: 2017-09-07 | Stop reason: HOSPADM

## 2017-09-06 RX ORDER — ASPIRIN 81 MG/1
81 TABLET, CHEWABLE ORAL DAILY
Status: DISCONTINUED | OUTPATIENT
Start: 2017-09-06 | End: 2017-09-07 | Stop reason: HOSPADM

## 2017-09-06 RX ORDER — AMOXICILLIN 250 MG
2 CAPSULE ORAL 2 TIMES DAILY
Status: CANCELLED | OUTPATIENT
Start: 2017-09-06

## 2017-09-06 RX ORDER — SODIUM CHLORIDE AND POTASSIUM CHLORIDE 150; 900 MG/100ML; MG/100ML
INJECTION, SOLUTION INTRAVENOUS CONTINUOUS
Status: CANCELLED | OUTPATIENT
Start: 2017-09-06

## 2017-09-06 RX ORDER — IPRATROPIUM BROMIDE AND ALBUTEROL SULFATE 2.5; .5 MG/3ML; MG/3ML
3 SOLUTION RESPIRATORY (INHALATION)
Status: DISCONTINUED | OUTPATIENT
Start: 2017-09-06 | End: 2017-09-07 | Stop reason: HOSPADM

## 2017-09-06 RX ORDER — LABETALOL HYDROCHLORIDE 5 MG/ML
5 INJECTION, SOLUTION INTRAVENOUS EVERY 4 HOURS PRN
Status: DISCONTINUED | OUTPATIENT
Start: 2017-09-06 | End: 2017-09-06

## 2017-09-06 RX ORDER — FAMOTIDINE 20 MG/1
20 TABLET, FILM COATED ORAL EVERY 12 HOURS
Status: DISCONTINUED | OUTPATIENT
Start: 2017-09-06 | End: 2017-09-07

## 2017-09-06 RX ORDER — MIDAZOLAM HYDROCHLORIDE 1 MG/ML
INJECTION INTRAMUSCULAR; INTRAVENOUS
Status: COMPLETED
Start: 2017-09-06 | End: 2017-09-06

## 2017-09-06 RX ORDER — LIDOCAINE HYDROCHLORIDE 20 MG/ML
INJECTION, SOLUTION INFILTRATION; PERINEURAL
Status: COMPLETED
Start: 2017-09-06 | End: 2017-09-06

## 2017-09-06 RX ORDER — BISACODYL 10 MG
10 SUPPOSITORY, RECTAL RECTAL
Status: CANCELLED | OUTPATIENT
Start: 2017-09-06

## 2017-09-06 RX ORDER — BUPIVACAINE HYDROCHLORIDE 2.5 MG/ML
INJECTION, SOLUTION EPIDURAL; INFILTRATION; INTRACAUDAL
Status: COMPLETED
Start: 2017-09-06 | End: 2017-09-06

## 2017-09-06 RX ORDER — SODIUM CHLORIDE, CALCIUM CHLORIDE, AND POTASSIUM CHLORIDE .86; .033; .03 G/100ML; G/100ML; G/100ML
INJECTION, SOLUTION INTRAVENOUS
Status: DISPENSED
Start: 2017-09-06 | End: 2017-09-06

## 2017-09-06 RX ORDER — ACETAMINOPHEN 325 MG/1
650 TABLET ORAL EVERY 6 HOURS PRN
Status: DISCONTINUED | OUTPATIENT
Start: 2017-09-06 | End: 2017-09-07 | Stop reason: HOSPADM

## 2017-09-06 RX ORDER — AMOXICILLIN 250 MG
2 CAPSULE ORAL 2 TIMES DAILY
Status: DISCONTINUED | OUTPATIENT
Start: 2017-09-06 | End: 2017-09-07 | Stop reason: HOSPADM

## 2017-09-06 RX ORDER — CHLORHEXIDINE GLUCONATE ORAL RINSE 1.2 MG/ML
15 SOLUTION DENTAL 2 TIMES DAILY
Status: DISCONTINUED | OUTPATIENT
Start: 2017-09-06 | End: 2017-09-06

## 2017-09-06 RX ORDER — POLYETHYLENE GLYCOL 3350 17 G/17G
1 POWDER, FOR SOLUTION ORAL
Status: CANCELLED | OUTPATIENT
Start: 2017-09-06

## 2017-09-06 RX ORDER — LIDOCAINE HYDROCHLORIDE 10 MG/ML
1-2 INJECTION, SOLUTION INFILTRATION; PERINEURAL
Status: DISCONTINUED | OUTPATIENT
Start: 2017-09-06 | End: 2017-09-07 | Stop reason: HOSPADM

## 2017-09-06 RX ORDER — SODIUM CHLORIDE 9 MG/ML
INJECTION, SOLUTION INTRAVENOUS CONTINUOUS
Status: DISCONTINUED | OUTPATIENT
Start: 2017-09-06 | End: 2017-09-07

## 2017-09-06 RX ORDER — POTASSIUM CHLORIDE 7.45 MG/ML
10 INJECTION INTRAVENOUS
Status: COMPLETED | OUTPATIENT
Start: 2017-09-06 | End: 2017-09-06

## 2017-09-06 RX ORDER — BISACODYL 10 MG
10 SUPPOSITORY, RECTAL RECTAL
Status: DISCONTINUED | OUTPATIENT
Start: 2017-09-06 | End: 2017-09-07 | Stop reason: HOSPADM

## 2017-09-06 RX ORDER — POLYETHYLENE GLYCOL 3350 17 G/17G
1 POWDER, FOR SOLUTION ORAL
Status: DISCONTINUED | OUTPATIENT
Start: 2017-09-06 | End: 2017-09-07 | Stop reason: HOSPADM

## 2017-09-06 RX ADMIN — PROPOFOL 70 MCG/KG/MIN: 10 INJECTION, EMULSION INTRAVENOUS at 09:25

## 2017-09-06 RX ADMIN — AMPICILLIN SODIUM AND SULBACTAM SODIUM 3 G: 2; 1 INJECTION, POWDER, FOR SOLUTION INTRAMUSCULAR; INTRAVENOUS at 11:45

## 2017-09-06 RX ADMIN — SODIUM CHLORIDE: 9 INJECTION, SOLUTION INTRAVENOUS at 07:47

## 2017-09-06 RX ADMIN — POTASSIUM CHLORIDE 10 MEQ: 7.46 INJECTION, SOLUTION INTRAVENOUS at 06:27

## 2017-09-06 RX ADMIN — PROPOFOL 50 MCG/KG/MIN: 10 INJECTION, EMULSION INTRAVENOUS at 06:26

## 2017-09-06 RX ADMIN — BUPIVACAINE HYDROCHLORIDE: 2.5 INJECTION, SOLUTION EPIDURAL; INFILTRATION; INTRACAUDAL; PERINEURAL at 14:13

## 2017-09-06 RX ADMIN — POTASSIUM CHLORIDE 10 MEQ: 7.46 INJECTION, SOLUTION INTRAVENOUS at 07:00

## 2017-09-06 RX ADMIN — FENTANYL CITRATE 25 MCG: 50 INJECTION, SOLUTION INTRAMUSCULAR; INTRAVENOUS at 14:32

## 2017-09-06 RX ADMIN — VANCOMYCIN HYDROCHLORIDE 1000 MG: 1 INJECTION, POWDER, LYOPHILIZED, FOR SOLUTION INTRAVENOUS at 14:11

## 2017-09-06 RX ADMIN — AMPICILLIN SODIUM AND SULBACTAM SODIUM 3 G: 2; 1 INJECTION, POWDER, FOR SOLUTION INTRAMUSCULAR; INTRAVENOUS at 17:56

## 2017-09-06 RX ADMIN — LIDOCAINE HYDROCHLORIDE: 20 INJECTION, SOLUTION INFILTRATION; PERINEURAL at 14:12

## 2017-09-06 RX ADMIN — POTASSIUM CHLORIDE 10 MEQ: 7.46 INJECTION, SOLUTION INTRAVENOUS at 05:06

## 2017-09-06 RX ADMIN — FAMOTIDINE 20 MG: 10 INJECTION INTRAVENOUS at 10:37

## 2017-09-06 RX ADMIN — AMPICILLIN SODIUM AND SULBACTAM SODIUM 3 G: 2; 1 INJECTION, POWDER, FOR SOLUTION INTRAMUSCULAR; INTRAVENOUS at 07:40

## 2017-09-06 RX ADMIN — PROPOFOL 55 MCG/KG/MIN: 10 INJECTION, EMULSION INTRAVENOUS at 15:00

## 2017-09-06 RX ADMIN — PROPOFOL 60 MCG/KG/MIN: 10 INJECTION, EMULSION INTRAVENOUS at 11:24

## 2017-09-06 RX ADMIN — IOHEXOL 10 ML: 350 INJECTION, SOLUTION INTRAVENOUS at 14:41

## 2017-09-06 RX ADMIN — FAMOTIDINE 20 MG: 20 TABLET, FILM COATED ORAL at 20:49

## 2017-09-06 RX ADMIN — MIDAZOLAM 0.5 MG: 1 INJECTION INTRAMUSCULAR; INTRAVENOUS at 14:32

## 2017-09-06 RX ADMIN — BUPIVACAINE HYDROCHLORIDE: 2.5 INJECTION, SOLUTION EPIDURAL; INFILTRATION; INTRACAUDAL; PERINEURAL at 14:14

## 2017-09-06 RX ADMIN — SODIUM CHLORIDE: 9 INJECTION, SOLUTION INTRAVENOUS at 17:30

## 2017-09-06 RX ADMIN — POTASSIUM CHLORIDE 10 MEQ: 7.46 INJECTION, SOLUTION INTRAVENOUS at 03:38

## 2017-09-06 RX ADMIN — AMPICILLIN SODIUM AND SULBACTAM SODIUM 3 G: 2; 1 INJECTION, POWDER, FOR SOLUTION INTRAMUSCULAR; INTRAVENOUS at 23:34

## 2017-09-06 RX ADMIN — ACETAMINOPHEN 650 MG: 325 TABLET, FILM COATED ORAL at 11:37

## 2017-09-06 RX ADMIN — CHLORHEXIDINE GLUCONATE 15 ML: 1.2 RINSE ORAL at 11:23

## 2017-09-06 RX ADMIN — PROPOFOL 20 MCG/KG/MIN: 10 INJECTION, EMULSION INTRAVENOUS at 02:24

## 2017-09-06 ASSESSMENT — PAIN SCALES - GENERAL
PAINLEVEL_OUTOF10: 0
PAINLEVEL_OUTOF10: 2
PAINLEVEL_OUTOF10: 2

## 2017-09-06 ASSESSMENT — PATIENT HEALTH QUESTIONNAIRE - PHQ9
SUM OF ALL RESPONSES TO PHQ9 QUESTIONS 1 AND 2: 0
1. LITTLE INTEREST OR PLEASURE IN DOING THINGS: NOT AT ALL
2. FEELING DOWN, DEPRESSED, IRRITABLE, OR HOPELESS: NOT AT ALL
SUM OF ALL RESPONSES TO PHQ QUESTIONS 1-9: 0

## 2017-09-06 ASSESSMENT — PULMONARY FUNCTION TESTS: FVC: 2.1

## 2017-09-06 NOTE — PROGRESS NOTES
Pt admitted to [facility]. VS documented; gtts verified in MAR. Hospitalist notified of pt's arrival.  Wife at bedside. Linens changed; no pt belongings were found under patient; ER notified. Wife to call [facility].

## 2017-09-06 NOTE — CONSULTS
DATE OF SERVICE:  09/06/2017    CHIEF COMPLAINT:  Complete heart block.    HISTORY OF PRESENT ILLNESS:  The patient is a 62-year-old gentleman   transferred from [facility] after an episode of complete heart   block.  He had an episode of syncope previously that was of uncertain   etiology.  He was first seen for this in December 2015.  The patient was at   work in [place] and felt very lightheaded.  He called the ambulance   himself.  The details of this event are incomplete as the patient is currently   intubated and on a ventilator.  In the emergency room, he was found to have   an episode of documented complete heart block with no ventricular response for   6.8 seconds.  The patient was placed on an external pacer and was sedated.    He was then intubated for airway protection.  Apparently, the patient did   aspirate at some point in time while in [place] and has been seen by the   pulmonary group for airway management and for treatment of his aspiration   pneumonia.  He is now on Unasyn.    PAST MEDICAL HISTORY:  He has a past history of hypertension.  He was last   seen in our office in May of this year by Dr. Espinal for treatment of his   hypertension.    MEDICATIONS ON ADMISSION:  Ramipril 10 mg twice daily, aspirin 81 mg a day,   Niaspan, HCTZ 25 mg a day and potassium replacement 10 mEq daily.    FAMILY HISTORY:  Currently unknown as the patient is intubated and sedated.    REVIEW OF SYSTEMS:  Also unobtainable as the patient is intubated and sedated.    PHYSICAL EXAMINATION:  GENERAL:  Reveals a fit-appearing gentleman who is intubated and on a   ventilator.  VITAL SIGNS:  He is currently in sinus rhythm at 58 per minute, blood pressure   is 102 systolic.  HEENT:  Pupils are equal, gaze conjugate.  NECK:  There is no JVD, no bruit.  LUNGS:  Clear, without wheezes.  HEART:  Regular rate and rhythm without S3, without murmur.  ABDOMEN:  Soft.  No hepatomegaly, no masses.  EXTREMITIES:   No edema.  Musculature is normal.  Posterior tibial pulses are   2+.  SKIN:  Warm and dry.  NEUROLOGIC:  Patient is unresponsive and is sedated as outlined above.    IMAGING:  EKG demonstrates sinus rhythm with an IVCD.    PERTINENT LABORATORY:  From [facility] reveals hemoglobin of   18, hematocrit of 51%.  Potassium of 2.6, glucose 136, BUN 16, creatinine 1.1.    INR is 1.0.    IMPRESSION:  1.  Complete heart block.  Patient has a documented complete heart block with   a 6.8-second pause.  2.  History of hypertension.  3.  Hypokalemia.  Potassium will be repleted.  4.  Polycythemia.  5.  Respiratory failure.  Currently, the patient is intubated and has evidence   of aspiration pneumonia.    PLAN:  Potassium will be repleted.  He will be scheduled for a pacer later   today.  Parenthetically, repeat potassium is now 3.3.       ____________________________________     MD ALICE CALLAHAN / YISEL    DD:  09/06/2017 12:05:43  DT:  09/06/2017 12:44:28    D#:  [id number]  Job#:  [id number]

## 2017-09-06 NOTE — RESPIRATORY CARE
Extubation    Cuff leak noted: Yes  Stridor present: No     Patient toleration: Well    Events/Summary/Plan: Patient extubated. (09/06/17 4595)

## 2017-09-06 NOTE — PROGRESS NOTES
Pulmonary Critical Care Progress Note        Date of service: 9/6/2017    Chief Complaint: Syncope    History of Present Illness: 62 y.o. male who apparently has a   history of systemic arterial hypertension, dyslipidemia, and chronic diastolic   heart failure with grade I diastolic dysfunction.  He was in the [place]   area.  He apparently became dizzy and diaphoretic.  EMS was activated.  He was   transported to the hospital in [place].  He was found to be in third   degree heart block.  He apparently experienced syncope, attempts were made to   externally pace him, but these were not successful.  He was found to have   significant hypokalemia with a potassium of 2.6 and his potassium was   repleted.  He was intubated.  Apparently following the initial intubation, he   vomited and he required repeat intubation and it was suspected at that time   that he aspirated.  He was then transported here to [facility].  He is now in the emergency department.      ROS:  Respiratory: unable to perform due to the patient's inability to effectively communicate, Cardiac: unable to perform due to the patient's inability to effectively communicate, GI: unable to perform due to the patient's inability to effectively communicate.  All other systems negative.    Interval Events:  24 hour interval history reviewed    - Patient awake and following commands   - Underwent permanent pacemaker placement today by cardiology    PFSH:  No change.    Respiratory:   CMV respiratory rate 20, tidal volume 470, PEEP of 8, FiO2 40%, peak inspiratory pressure 18  Pulse Oximetry: 99 %          Exam: unlabored respirations, no intercostal retractions or accessory muscle use and rhonchi bibasilar, minimal secretions  ImagingCXR  I have personally reviewed the chest x-ray my impression is  enlarged cardiac silhouette with low lung volumes and perihilar infiltrates, ET tube 7.2 cm above igsela, gastric tube in good  position  Recent Labs      09/06/17   0526   ISTATAPH  7.378*   ISTATAPCO2  45.9*   ISTATAPO2  74   ISTATATCO2  28   SAZXLHT1SEC  94   ISTATARTHCO3  27.0*   ISTATARTBE  1   ISTATTEMP  see below   ISTATFIO2  40   ISTATSPEC  Arterial   ABG: Compensated respiratory acidosis with adequate oxygenation    HemoDynamics:  Pulse: 71, Heart Rate (Monitored): 75  Blood Pressure: 113/65, NIBP: 132/55       Exam: frequent ectopy, prior EKG with complete heart block, sinus bradycardia  Imaging: Available data reviewed  Recent Labs      09/06/17 0227   TROPONINI  0.05*   BNPBTYPENAT  18       Neuro:  GCS Total [place] Coma Score: 10 Propofol gtt       Exam: no focal deficits noted mental status intact follows commands, raises two fingers  Imaging: Available data reviewed    Fluids:  Intake/Output       09/04/17 0700 - 09/05/17 0659 (Not Admitted) 09/05/17 0700 - 09/06/17 0659 (Not Admitted) 09/06/17 0700 - 09/07/17 0659      [id number] 9693-8069 Total 8142-4578 6867-6371 Total 6128-0555 0655-2006 Total       Intake    Other  --  -- --  --  -- --  30  -- 30    Medications (P.O./ Enteral Liquids) -- -- -- -- -- -- 30 -- 30    Enteral  --  -- --  --  -- --  30  -- 30    Free Water / Tube Flush -- -- -- -- -- -- 30 -- 30    Total Intake -- -- -- -- -- -- 60 -- 60       Output    Urine  --  -- --  --  -- --  300  -- 300    Void (ml) -- -- -- -- -- -- 300 -- 300    Total Output -- -- -- -- -- -- 300 -- 300       Net I/O     -- -- -- -- -- -- -240 -- -240        Weight: 103.4 kg (227 lb 15.3 oz)  Recent Labs      09/06/17 0227   SODIUM  138   POTASSIUM  3.3*   CHLORIDE  106   CO2  24   BUN  16   CREATININE  0.79   CALCIUM  8.7       GI/Nutrition:  Exam: abdomen is soft and non-tender, normal active bowel sounds  Imaging: Available data reviewed  NG tube to continuous wall suction and NPO  Liver Function  Recent Labs      09/06/17   0227   ALTSGPT  23   ASTSGOT  33   ALKPHOSPHAT  68   TBILIRUBIN  1.0   GLUCOSE  150*       Heme:  Recent  Labs      17   RBC  5.35   HEMOGLOBIN  16.5   HEMATOCRIT  46.0   PLATELETCT  198   PROTHROMBTM  13.8   APTT  25.8   INR  1.03       Infectious Disease:  Monitored Temp  Av.3 °C (101 °F)  Min: 37.7 °C (99.9 °F)  Max: 38.6 °C (101.5 °F)  Temp  Av.2 °C (97.2 °F)  Min: 36.2 °C (97.2 °F)  Max: 36.2 °C (97.2 °F)  Micro: reviewed  Recent Labs      17   WBC  9.5   NEUTSPOLYS  90.30*   LYMPHOCYTES  6.80*   MONOCYTES  2.20   EOSINOPHILS  0.10   BASOPHILS  0.10   ASTSGOT  33   ALTSGPT  23   ALKPHOSPHAT  68   TBILIRUBIN  1.0     Current Facility-Administered Medications   Medication Dose Frequency Provider Last Rate Last Dose   • Respiratory Care per Protocol   Continuous RT Man Marshall M.D.       • senna-docusate (PERICOLACE or SENOKOT S) 8.6-50 MG per tablet 2 Tab  2 Tab BID Man Marshall M.D.   Stopped at 17 0900    And   • polyethylene glycol/lytes (MIRALAX) PACKET 1 Packet  1 Packet QDAY PRN Man Marshall M.D.        And   • magnesium hydroxide (MILK OF MAGNESIA) suspension 30 mL  30 mL QDAY PRN Man Marshall M.D.        And   • bisacodyl (DULCOLAX) suppository 10 mg  10 mg QDAY PRN Man Marshall M.D.       • chlorhexidine (PERIDEX) 0.12 % solution 15 mL  15 mL BID Man Marshall M.D.   15 mL at 17 1123   • lidocaine (XYLOCAINE) 1%  injection  1-2 mL Q30 MIN PRN Man Marshall M.D.       • MD ALERT...Adult ICU Electrolyte Replacement per Pharmacy Protocol   pharmacy to dose Man Marshall M.D.       • NS infusion   Continuous Man Marshall M.D. 83 mL/hr at 17 0945     • enoxaparin (LOVENOX) inj 40 mg  40 mg DAILY Man Marshall M.D.   Stopped at 17 0900   • fentaNYL (SUBLIMAZE) injection 25 mcg  25 mcg Q HOUR PRN Man Marshall M.D.        Or   • fentaNYL (SUBLIMAZE) injection 50 mcg  50 mcg Q HOUR PRN Man Marshall M.D.        Or   • fentaNYL (SUBLIMAZE)  injection 100 mcg  100 mcg Q HOUR PRN Man Marshall M.D.       • fentaNYL (SUBLIMAZE) 50 mcg/mL in 50mL   Continuous Man Marshall M.D.   Stopped at 09/06/17 0345   • ipratropium-albuterol (DUONEB) nebulizer solution 3 mL  3 mL Q2HRS PRN (RT) Man Marshall M.D.       • famotidine (PEPCID) tablet 20 mg  20 mg Q12HRS Man Marshall M.D.        Or   • famotidine (PEPCID) injection 20 mg  20 mg Q12HRS Man Marshall M.D.   20 mg at 09/06/17 1037   • insulin lispro (HUMALOG) injection 2-9 Units  2-9 Units Q6HRS Man Marshall M.D.   Stopped at 09/06/17 0600   • glucose 4 g chewable tablet 16 g  16 g Q15 MIN PRN Man Marshall M.D.        And   • dextrose 50% (D50W) injection 25 mL  25 mL Q15 MIN PRN Man Marshall M.D.       • propofol (DIPRIVAN) injection  0-80 mcg/kg/min Continuous Man Marshall M.D.   Stopped at 09/06/17 1540   • aspirin (ASA) chewable tab 81 mg  81 mg DAILY Rosita Dave M.D.   Stopped at 09/06/17 0900   • acetaminophen (TYLENOL) tablet 650 mg  650 mg Q6HRS PRN Rosita Dave M.D.   650 mg at 09/06/17 1137   • ampicillin/sulbactam (UNASYN) 3 g in  mL IVPB  3 g Q6HRS Man Marshall M.D.   Stopped at 09/06/17 1215   • Pharmacy Consult Request ...Pain Management Review 1 Each  1 Each PRN Nani Chahal M.D.         Last reviewed on 9/6/2017  8:39 AM by Neha Law    Quality  Measures:  Medications reviewed, Labs reviewed, Radiology images reviewed and EKG reviewed  Kennedy catheter: Critically Ill - Requiring Accurate Measurement of Urinary Output and Unconscious / Sedated Patient on a Ventilator      DVT Prophylaxis: Enoxaparin (Lovenox)  DVT prophylaxis - mechanical: SCDs  Ulcer prophylaxis: Yes  Antibiotics: Treating active infection/contamination beyond 24 hours perioperative coverage  Assessed for rehab: Patient unable to tolerate rehabilitation therapeutic regimen      Assessment/Plan:  Acute hypoxic  respiratory failure - intubated at OSH 9/5   - cont full vent support, wean FiO2    - pending decision by cardiology regarding PPM, if no procedure or post-procedure, hopeful extubation   - RT protocols   - sedation prn  Third-degree atrioventricular block with associated syncope   - cardiology to evaluate, currently in sinus micheal   - prn dopamine or TC pacing if recurs and symptomatic  Aspiration pneumonitis s/p therapeutic and diagnostic BAL 9/6   - cont empiric abx, f/u cultures  Sinus bradycardia with first-degree atrioventricular block.  Hypokalemia - repletion, check Mg  Systemic arterial hypertension.  Dyslipidemia.  Chronic diastolic heart failure with grade I diastolic dysfunction.  Prophylaxis, NPO pending extubation decision    Discussed patient condition and risk of morbidity and/or mortality with Family, RN, RT, Pharmacy, Charge nurse / hot rounds, Patient and cardiology and hospitalist.    The patient remains critically ill.  Critical care time = 41 minutes in directly providing and coordinating critical care and extensive data review.  No time overlap and excludes procedures.

## 2017-09-06 NOTE — ED PROVIDER NOTES
ED Provider Note    ED Provider Note      Primary care provider: Aurelio ASHFORD M.D.    CHIEF COMPLAINT  Chief Complaint   Patient presents with   • Dizziness   • Bradycardia       HPI  Sanya Miranda is a 62 y.o. male who presents to the Emergency Department Transferred from outside hospital for further evaluation treatment. Apparently tonight patient was in a hotel room when he had acute onset of dizziness diaphoresis palpitations. Patient went outside hospital where he was found to be no third-degree AV block. Patient initially had transcutaneous pads placed however did not tolerate transcutaneous pacing and was removed at outside hospital. Patient then returned into a third-degree heart block. To facilitate transcutaneous pacing patient was intubated and sedated transcutaneous pacing reinitiated patient didn't have self extubation possible aspiration with one episode of emesis was reintubated transiently cutaneous pacing reinstituted. Patient was found to have a potassium of 2.6 was replaced with 40 mEq IV at outside hospital. Was transferred here for further evaluation treatment. Further history of present illness Imani by critical condition and intubation.    REVIEW OF SYSTEMS  As per HPI further limited by intubation and sedation        Past medical surgical social family history obtained by chart review  PAST MEDICAL HISTORY   has a past medical history of Hypertension.    SURGICAL HISTORY  patient denies any surgical history    SOCIAL HISTORY  Social History   Substance Use Topics   • Smoking status: Never Smoker   • Smokeless tobacco: Never Used   • Alcohol use 1.2 oz/week     2 Glasses of wine per week      Comment: 2-3 per week      History   Drug Use No       FAMILY HISTORY  Non-Contributory    CURRENT MEDICATIONS  Home Medications    **Home medications have not yet been reviewed for this encounter**         ALLERGIES  Allergies   Allergen Reactions   • Sulfa Drugs Hives       PHYSICAL EXAM  VITAL  SIGNS: /65   Pulse 74   Temp 36.2 °C (97.2 °F)   Resp 20   Ht 1.829 m (6')   Wt 95 kg (209 lb 7 oz)   SpO2 100%   BMI 28.40 kg/m²   Pulse ox interpretation: I interpret this pulse ox as normal.  Constitutional:Intubated and sedated  HEENT: Atraumatic normocephalic, pupils are equal round reactive to light . The nares is clear, external ears are normal, mouth shows moist mucous membranes endotracheal tube in place  Neck: Supple, no JVD no tracheal deviation  Cardiovascular: Regular rate and rhythm no murmur rub or gallop 2+ pulses peripherally x4  Thorax & Lungs: No adventitious sounds appreciated over mechanical ventilation  GI: Nondistended positive bowel sounds  : Kennedy in place  Skin: Warm dry no acute rash or lesion  Musculoskeletal:  no acute  deformity  Neurologic: GCS 3T  Psychiatric: GCS 3T      DIAGNOSTIC STUDIES / PROCEDURES  LABS  Results for orders placed or performed during the hospital encounter of 09/06/17   CBC WITH DIFFERENTIAL   Result Value Ref Range    WBC 9.5 4.8 - 10.8 K/uL    RBC 5.35 4.70 - 6.10 M/uL    Hemoglobin 16.5 14.0 - 18.0 g/dL    Hematocrit 46.0 42.0 - 52.0 %    MCV 86.0 81.4 - 97.8 fL    MCH 30.8 27.0 - 33.0 pg    MCHC 35.9 (H) 33.7 - 35.3 g/dL    RDW 38.8 35.9 - 50.0 fL    Platelet Count 198 164 - 446 K/uL    MPV 10.1 9.0 - 12.9 fL    Neutrophils-Polys 90.30 (H) 44.00 - 72.00 %    Lymphocytes 6.80 (L) 22.00 - 41.00 %    Monocytes 2.20 0.00 - 13.40 %    Eosinophils 0.10 0.00 - 6.90 %    Basophils 0.10 0.00 - 1.80 %    Immature Granulocytes 0.50 0.00 - 0.90 %    Nucleated RBC 0.00 /100 WBC    Neutrophils (Absolute) 8.58 (H) 1.82 - 7.42 K/uL    Lymphs (Absolute) 0.65 (L) 1.00 - 4.80 K/uL    Monos (Absolute) 0.21 0.00 - 0.85 K/uL    Eos (Absolute) 0.01 0.00 - 0.51 K/uL    Baso (Absolute) 0.01 0.00 - 0.12 K/uL    Immature Granulocytes (abs) 0.05 0.00 - 0.11 K/uL    NRBC (Absolute) 0.00 K/uL   COMP METABOLIC PANEL   Result Value Ref Range    Sodium 138 135 - 145 mmol/L     Potassium 3.3 (L) 3.6 - 5.5 mmol/L    Chloride 106 96 - 112 mmol/L    Co2 24 20 - 33 mmol/L    Anion Gap 8.0 0.0 - 11.9    Glucose 150 (H) 65 - 99 mg/dL    Bun 16 8 - 22 mg/dL    Creatinine 0.79 0.50 - 1.40 mg/dL    Calcium 8.7 8.5 - 10.5 mg/dL    AST(SGOT) 33 12 - 45 U/L    ALT(SGPT) 23 2 - 50 U/L    Alkaline Phosphatase 68 30 - 99 U/L    Total Bilirubin 1.0 0.1 - 1.5 mg/dL    Albumin 3.6 3.2 - 4.9 g/dL    Total Protein 6.0 6.0 - 8.2 g/dL    Globulin 2.4 1.9 - 3.5 g/dL    A-G Ratio 1.5 g/dL   PROTHROMBIN TIME   Result Value Ref Range    PT 13.8 12.0 - 14.6 sec    INR 1.03 0.87 - 1.13   APTT   Result Value Ref Range    APTT 25.8 24.7 - 36.0 sec   TROPONIN   Result Value Ref Range    Troponin I 0.05 (H) 0.00 - 0.04 ng/mL   BTYPE NATRIURETIC PEPTIDE   Result Value Ref Range    B Natriuretic Peptide 18 0 - 100 pg/mL   ESTIMATED GFR   Result Value Ref Range    GFR If African American >60 >60 mL/min/1.73 m 2    GFR If Non African American >60 >60 mL/min/1.73 m 2   GRAM STAIN   Result Value Ref Range    Significant Indicator .     Source RESP     Site BRONCHOALVEOLAR LAVAGE RLL     Gram Stain Result       Moderate WBCs.  Few Yeast.  Many Gram positive cocci.     EKG (ER)   Result Value Ref Range    Report       [facility] Emergency Dept.    Test Date:  2017  Pt Name:    MAGO ARMAS              Department: ER  MRN:        [id number]                      Room:       RD 08  Gender:     M                            Technician: [id number]  :        [id number]                   Requested By:JENNIFER BARNES  Order #:    [id number]                    Jitendra MD:    Measurements  Intervals                                Axis  Rate:       59                           P:          73  GA:         228                          QRS:        104  QRSD:       168                          T:          -30  QT:         500  QTc:        496    Interpretive Statements  SINUS BRADYCARDIA  FIRST DEGREE AV BLOCK  RBBB  AND LPFB  No previous ECG available for comparison     ISTAT ARTERIAL BLOOD GAS   Result Value Ref Range    Ph 7.378 (L) 7.400 - 7.500    Pco2 45.9 (H) 26.0 - 37.0 mmHg    Po2 74 64 - 87 mmHg    Tco2 28 20 - 33 mmol/L    S02 94 93 - 99 %    Hco3 27.0 (H) 17.0 - 25.0 mmol/L    BE 1 -4 - 3 mmol/L    Body Temp see below degrees    O2 Therapy 40 %    Specimen Arterial        All labs reviewed by me.    EKG Interpretation  Interpreted by me    Borderline bradycardia at a rate of 59 occasional PVC nonspecific interventricular conduction delay no ST elevation or ST depression and T-wave inversion in lead 3    RADIOLOGY  DX-CHEST-PORTABLE (1 VIEW)   Final Result         1. Low lung volume with hypoventilatory change.      DX-CHEST-PORTABLE (1 VIEW)    (Results Pending)     The radiologist's interpretation of all radiological studies have been reviewed by me.    COURSE & MEDICAL DECISION MAKING  Pertinent Labs & Imaging studies reviewed. (See chart for details)    Medical Decision Makin-year-old male arise from outside hospital intubated and sedated for third-degree heart block and transcutaneous pacing. Patient had been discontinued prior to arrival is patient returned to sinus rhythm. Questionable whether this was due to electrolyte abnormalities potassium was extremely low at outside hospital and then begin replacement patient's potassium here is 3.3. Patient also slight elevation of troponin likely to be pacing/demand ischemia. ABG demonstrates slight respiratory acidosis. Patient remains in critical condition and discuss this with pulmonologist/critical care physician Dr. Mendez, who agrees to consult on the patient also discussed with hospitalist Dr. Dave.  Patient admitted in critical condition to the ICU for further ongoing management and anticipate cardiology consult in the a.m. do not see an acute reason to consult them at this time.     Critical care:  40 minutes of critical care time was spent with  this patient including initial evaluation initial resuscitation the ordering of labs EKGs images and rhythm strip interpretation of such. Interventions based on such. Discussing the case consulting physicians. Does not include separately billable procedures.        FINAL IMPRESSION  1. Heart block     2.Respiratory acidosis  3. Non-ST elevation myocardial infarction      This dictation has been created using voice recognition software and/or scribes. The accuracy of the dictation is limited by the abilities of the software and the expertise of the scribes. I expect there may be some errors of grammar and possibly content. I made every attempt to manually correct the errors within my dictation. However, errors related to voice recognition software and/or scribes may still exist and should be interpreted within the appropriate context.

## 2017-09-06 NOTE — OP REPORT
[facility] ELECTROPHYSIOLOGY PROCEDURE NOTE    PROCEDURE PERFORMED: Permanent Pacemaker Implantation    DATE OF SERVICE: 9/6/2017    : Nani Chahal MD    ASSISTANT: Nato Calhoun MS4    ANESTHESIA: Local and moderate sedation    EBL: 30 cc    SPECIMENS: None    INDICATION(S):  1)Syncope  2)Complete heart block    DESCRIPTION OF PROCEDURE:  After informed written consent, the patient was brought to the electrophysiology lab in the fasting, unsedated state. The patient was prepped and draped in the usual sterile fashion. The procedure was performed under moderate sedation with local anesthetic. A right upper extremity venogram was performed, demonstrating a patent subclavian vein. A right infraclavicular incision was made with a scalpel and the pectoral device pocket was created using a combination of blunt dissection and electrocautery. The modified Seldinger technique was used to gain access to the left axillary vein. A peel-away hemostasis sheath was placed in the vein. Under fluoroscopic guidance, the pacemaker leads were introduced into the heart. The ventricular lead was advanced to the RVOT and then lowered into position at the RV apex. The atrial lead was positioned on R atrial appendage. The leads were tested and had satisfactory sensing and pacing parameters. High output ventricular pacing did not produce extracardiac stimulation. The leads were sutured to the underlying pectoral muscle with interrupted silk over a silastic suture sleeve. The device pocket was irrigated with antibiotic solution, inspected, and no bleeding was seen. The leads were connected to the pacemaker pulse generator and the device was inserted into the pocket. The wound was closed with three layers of absorbable sutures. Following recovery from sedation, the patient was transferred to a monitored bed in good condition.     IMPLANTED DEVICE INFORMATION:  Pulse generator is a [employer] model IT7774  Serial # [id number]    LEAD  INFORMATION:  1)Right atrial lead is a SJM model #EMM5031H/46, serial #[id number], P wave 3.5 millivolts, threshold 0.25 Volts at 0.25 milliseconds, pacing impedance 510 Ohms.    2)Right ventricular lead is a [employer] model #TQW4769C/52, serial #[id number], R wave 10.1 millivolts, threshold 0.5 Volts at 0.5 milliseconds, pacing impedance 690 Ohms.    DEVICE PROGRAMMING:  DDD 60 -130 ppm    FLUOROSCOPY TIME: 4.1 minutes    IMPRESSIONS:  1. Successful dual chamber pacemaker implantation    COMPLICATION(S): None    RECOMMENDATIONS:  1. Transfer to monitored bed  2. Chest x-ray  3. Device interrogation prior to hospital discharge  4. Followup in device clinic

## 2017-09-06 NOTE — ED NOTES
"Med rec updated and complete  Allergies reviewed, per wife  Pt is intubated.  Pts wife states \"I'm not sure when he took his medicaitons last\".  Pts wife states \"No antibiotics in the last 30 days\".     "

## 2017-09-06 NOTE — ED NOTES
Chief Complaint   Patient presents with   • Dizziness   • Bradycardia     Blood pressure 113/65, pulse (!) 54, temperature 36.2 °C (97.2 °F), resp. rate 18, height 1.829 m (6'), weight 95 kg (209 lb 7 oz), SpO2 99 %.    Pt BIB EMS for above complaint    Per EMS report, pt was in hotel room and had sudden onset of dizziness and was diaphoretic. On EMS arrival, pt was in complete HB. EMS paced pt, but was unable to capture and brought to [facility]. Pt continued to have syncopal episodes and was intubated. Pt was intubated multiple times due to vomitus. Per report, facility gave an unknown amount of bicarb and calcium. Pt calcium currently at 12 and potassium 2.6. Med flight stated pt has received 4 k-riders, 200mcg of fent and 500 cc bolus en route. Pt was on verced drip. Pt HR in 50s.

## 2017-09-06 NOTE — ASSESSMENT & PLAN NOTE
Patient was on ACE inhibitor and diuretics  Blood pressure currently stable  Hold diuretics due to hypokalemia  Continue other medication  When necessary iv BBL

## 2017-09-06 NOTE — ASSESSMENT & PLAN NOTE
Etiology unclear  Low K?  Need cardiologist consult in AM  Stable emilyennakul velasco transcutaneuos pad in place

## 2017-09-06 NOTE — CONSULTS
DATE OF SERVICE:  09/06/2017    REQUESTING PHYSICIAN:  Danie Larsen MD.    CONSULTING PHYSICIAN:  Man Mendez MD.    TYPE OF CONSULTATION:  Pulmonary medicine and critical care medicine.    REASON FOR CONSULTATION:  Evaluation and management of ventilator-dependent   respiratory failure and assist with critical care management.    CHIEF COMPLAINT:  The patient cannot provide.    HISTORY OF PRESENT ILLNESS:  The entire history is obtained from healthcare   providers and medical record as this gentleman cannot give me any history.  I   was kindly asked by Dr. Larsen to see and evaluate this gentleman regarding   the above problems.  This is a 62-year-old gentleman who apparently has a   history of systemic arterial hypertension, dyslipidemia, and chronic diastolic   heart failure with grade I diastolic dysfunction.  He was in the [place]   area.  He apparently became dizzy and diaphoretic.  EMS was activated.  He was   transported to the hospital in [place].  He was found to be in third   degree heart block.  He apparently experienced syncope, attempts were made to   externally pace him, but these were not successful.  He was found to have   significant hypokalemia with a potassium of 2.6 and his potassium was   repleted.  He was intubated.  Apparently following the initial intubation, he   vomited and he required repeat intubation and it was suspected at that time   that he aspirated.  He was then transported here to [facility].  He is now in the emergency department.    CURRENT MEDICATIONS:  He takes:  1.  Aspirin 81 mg a day.  2.  Hydrochlorothiazide 25 mg daily.  3.  Potassium supplements.  4.  Niaspan 1000 mg twice a day.  5.  Ramipril 10 mg twice a day.    ALLERGIES:  HE IS ALLERGIC TO SULFA.    PAST SURGICAL HISTORY:  None.    ILLNESSES:  Chronic diastolic heart failure with grade I diastolic   dysfunction, systemic arterial hypertension and  dyslipidemia.    SOCIAL HISTORY:  He has never smoked.  He does not abuse alcohol.  He is   .    FAMILY HISTORY:  Not obtainable.    REVIEW OF SYSTEMS:  Not obtainable.    PHYSICAL EXAMINATION:  VITAL SIGNS:  His temperature is 97.2, blood pressure 113/65, heart rate 48,   respiratory rate is 18.  GENERAL:  He is a sedated gentleman on the ventilator.  HEENT:  Normocephalic, atraumatic.  Sinuses are nontender.  Nares patent.    Oropharynx with moist mucous membranes.  An endotracheal tube is in place.  NECK:  Trachea is midline, supple.  CHEST:  Symmetrical.  HEART:  Regular rhythm.  He is bradycardic.  LUNGS:  Few scattered coarse crackles bilaterally, but no wheezing.  He has   good air movement.  ABDOMEN:  Soft, nondistended, and nontender.  EXTREMITIES:  No clubbing, cyanosis or edema.  NEUROLOGIC:  He is sedated.    DIAGNOSTIC AND LABORATORY DATA:  His white blood cell count is 9500,   hemoglobin 16.5, hematocrit 46.0, and platelet count 198,000.  His sodium is   138, potassium 3.3, chloride 106, CO2 of 24, BUN 16, creatinine 0.79, and   glucose 150.  Troponin I is 0.05.  B-type natriuretic peptide is 18.  His   chest x-ray reveals increased opacification in the right infrahilar region and   in the left perihilar region.  I have reviewed the electrocardiogram from   [facility] he presented with third degree atrioventricular block.    Electrocardiogram here at [facility] shows sinus bradycardia with a heart rate of   59.  He has a first-degree atrioventricular block.  He has a right   bundle-branch block and left posterior fascicular block.    IMPRESSION:  1.  Ventilator-dependent respiratory failure.  2.  Third-degree atrioventricular block with associated syncope.  3.  Aspiration.  I performed bronchoscopy in the emergency room here at [facility]   and he clearly has aspirated gastric contents.  See separate dictation.  4.  Sinus bradycardia with first-degree atrioventricular block.  5.  Hypokalemia.  6.   Systemic arterial hypertension.  7.  Dyslipidemia.  8.  Chronic diastolic heart failure with grade I diastolic dysfunction.    PLAN AND MEDICAL DECISION MAKING:  This gentleman is critically ill.  He will   be admitted to the intensive care unit.  He will remain intubated on full   mechanical ventilatory support.  Deep venous thrombosis prophylaxis and stress   ulcer prophylaxis will both be provided.  We will get cardiology   consultation.  His sputum has been cultured.  We will empirically place him on   intravenous ampicillin/sulbactam, pending culture results.  His potassium   will be repleted.  At the current time, his prognosis is quite guarded and he   is critically ill.  I have spent a total of 92 minutes providing direct   critical care services at the bedside.  There has been no time overlap.  The   time spent excludes the time spent performing procedures (40134, [id number]).    The case has been reviewed with the wife and son at the bedside as well as   with Dr. Larsen, nursing and respiratory therapy.    Thank you Dr. Larsen for allowing us to participate in the care of this   gentleman.  We will continue to follow him with great interest.       ____________________________________     MD KAMRAN LI / YISEL    DD:  09/06/2017 05:52:46  DT:  09/06/2017 06:14:19    D#:  [id number]  Job#:  [id number]    cc: Danie Larsen MD

## 2017-09-06 NOTE — H&P
HOSPITAL MEDICINE HISTORY/ PHYSICAL    Date & Time note created:    9/6/2017   4:14 AM       Date & Time Patient was seen:   9/6/2017    Patient ID:   Name: Sanya Miranda  . YOB: 1955. Age: 62 y.o. male. MRN: [id number]    Admitting Attending:  Rosita Dave     PCP : Aurelio ASHFORD M.D.        Chief Complaint:       3rd AVB    History of Present Illness:    Ruben is a 62 y.o. male w/h/o HTN who presents with 3rd AVB and need intubation. Per EMS report, pt was in hotel room   and had sudden onset of dizziness and was diaphoretic. On EMS arrival, pt was in 3rd AVB. EMS paced pt, but was unable   to capture and brought to other hospital . Pt continued to have syncopal episodes and was intubated on the hospital. Patient   also vomited and concern of aspiration so patient was reintubated by our hospitalist. Patient was put on transcutaneous   pacing pads around the hospital. Patient currently is intubated and is unable to provide medical history. Medical history was   obtained by chart review and discussed with the ERP.    Review of Systems:      per HPI otherwise 14 points reviewed 12 systems neg per AMA/CMS criteria.        Past Medical/ Family / Social history (PFSH):   Past Medical History:   Diagnosis Date   • Hypertension      No past surgical history on file.  Current Outpatient Medications:  No current facility-administered medications on file prior to encounter.      Current Outpatient Prescriptions on File Prior to Encounter   Medication Sig Dispense Refill   • KLOR-CON 10 10 MEQ tablet TAKE 1 TAB BY MOUTH 2 TIMES A DAY. 60 Tab 11   • amlodipine (NORVASC) 10 MG Tab Take 1 Tab by mouth every day. 30 Tab 11   • hydrochlorothiazide (HYDRODIURIL) 25 MG Tab Take 1 Tab by mouth every day. Needs to be seen for further refills. Thank you 30 Tab 11   • ramipril (ALTACE) 10 MG capsule Take 10 mg by mouth 2 Times a Day.     • aspirin 81 MG tablet Take 81 mg by mouth every day.     • NIASPAN 1000 MG PO  TBCR Take 1000 mg by mouth 2 Times a Day.        Medication Allergy/Sensitivities:  Allergies   Allergen Reactions   • Sulfa Drugs Hives     Family History:  Family History   Problem Relation Age of Onset   • Heart Disease Mother      CABG at age late 60   • Hypertension Mother    • Heart Disease Father      CABG at age 83   • Hypertension Father       reviewed and felt non pertinent to this encounter     Social History:  Social History   Substance Use Topics   • Smoking status: Never Smoker   • Smokeless tobacco: Never Used   • Alcohol use 1.2 oz/week     2 Glasses of wine per week      Comment: 2-3 per week     #################################################################  Physical Exam:   Vitals/ General Appearance:   Weight/BMI: Body mass index is 28.4 kg/m².  Blood pressure 113/65, pulse 65, temperature 36.2 °C (97.2 °F), resp. rate (!) 22, height 1.829 m (6'), weight 95 kg (209 lb 7 oz), SpO2 98 %.   Vitals:    09/06/17 0335 09/06/17 0350 09/06/17 0400 09/06/17 0410   BP:       Pulse: (!) 47 (!) 59 65    Resp: 19 (!) 33 (!) 22    Temp:       SpO2: 100% 100% 100% 98%   Weight:       Height:        Oxygen Therapy:  Pulse Oximetry: 98 %    Constitutional:  intubated  HENMT: Normocephalic, atraumatic, b/l ears normal, nose normal  Eyes:  EOMI, conjunctiva normal, no discharge  Neck: no tracheal deviation, supple  Cardiovascular: bradycardia sinus, normal rhythm, no murmurs, no rubs or gallops; no cyanosis, clubbing or edema  Lungs: Respiratory effort is normal, normal breath sounds, breath sounds clear to auscultation b/l, no rales, rhonchi or wheezing  Abdomen: soft, non-tender, no guarding or rebound, active BS, no mass  Skin: warm, dry, no erythema, no rash  Neurologic: Alert and oriented x 0, no focal deficits, CN II-XII normal  Psychiatric: Unable to assess for anxiety or depression  Lymph node: No lymphadenopathy appreciated in the neck groin and axillary area.   Extremities: Bilateral lower extremities  no pitting edema, bilateral pulses symmetric  #################################################################  Lab Data Review:    Objective   Recent Results (from the past 24 hour(s))   EKG (ER)    Collection Time: 17  2:13 AM   Result Value Ref Range    Report       [facility] Emergency Dept.    Test Date:  2017  Pt Name:    MAGO ARMAS              Department: ER  MRN:        [id number]                      Room:       [facility]  Gender:     M                            Technician: [id number]  :        1955                   Requested By:JENNIFER BARNES  Order #:    [id number]                    Reading MD:    Measurements  Intervals                                Axis  Rate:       59                           P:          73  NV:         228                          QRS:        104  QRSD:       168                          T:          -30  QT:         500  QTc:        496    Interpretive Statements  SINUS BRADYCARDIA  FIRST DEGREE AV BLOCK  RBBB AND LPFB  No previous ECG available for comparison     CBC WITH DIFFERENTIAL    Collection Time: 17  2:27 AM   Result Value Ref Range    WBC 9.5 4.8 - 10.8 K/uL    RBC 5.35 4.70 - 6.10 M/uL    Hemoglobin 16.5 14.0 - 18.0 g/dL    Hematocrit 46.0 42.0 - 52.0 %    MCV 86.0 81.4 - 97.8 fL    MCH 30.8 27.0 - 33.0 pg    MCHC 35.9 (H) 33.7 - 35.3 g/dL    RDW 38.8 35.9 - 50.0 fL    Platelet Count 198 164 - 446 K/uL    MPV 10.1 9.0 - 12.9 fL    Neutrophils-Polys 90.30 (H) 44.00 - 72.00 %    Lymphocytes 6.80 (L) 22.00 - 41.00 %    Monocytes 2.20 0.00 - 13.40 %    Eosinophils 0.10 0.00 - 6.90 %    Basophils 0.10 0.00 - 1.80 %    Immature Granulocytes 0.50 0.00 - 0.90 %    Nucleated RBC 0.00 /100 WBC    Neutrophils (Absolute) 8.58 (H) 1.82 - 7.42 K/uL    Lymphs (Absolute) 0.65 (L) 1.00 - 4.80 K/uL    Monos (Absolute) 0.21 0.00 - 0.85 K/uL    Eos (Absolute) 0.01 0.00 - 0.51 K/uL    Baso (Absolute) 0.01 0.00 - 0.12 K/uL    Immature Granulocytes  (abs) 0.05 0.00 - 0.11 K/uL    NRBC (Absolute) 0.00 K/uL   COMP METABOLIC PANEL    Collection Time: 09/06/17  2:27 AM   Result Value Ref Range    Sodium 138 135 - 145 mmol/L    Potassium 3.3 (L) 3.6 - 5.5 mmol/L    Chloride 106 96 - 112 mmol/L    Co2 24 20 - 33 mmol/L    Anion Gap 8.0 0.0 - 11.9    Glucose 150 (H) 65 - 99 mg/dL    Bun 16 8 - 22 mg/dL    Creatinine 0.79 0.50 - 1.40 mg/dL    Calcium 8.7 8.5 - 10.5 mg/dL    AST(SGOT) 33 12 - 45 U/L    ALT(SGPT) 23 2 - 50 U/L    Alkaline Phosphatase 68 30 - 99 U/L    Total Bilirubin 1.0 0.1 - 1.5 mg/dL    Albumin 3.6 3.2 - 4.9 g/dL    Total Protein 6.0 6.0 - 8.2 g/dL    Globulin 2.4 1.9 - 3.5 g/dL    A-G Ratio 1.5 g/dL   PROTHROMBIN TIME    Collection Time: 09/06/17  2:27 AM   Result Value Ref Range    PT 13.8 12.0 - 14.6 sec    INR 1.03 0.87 - 1.13   APTT    Collection Time: 09/06/17  2:27 AM   Result Value Ref Range    APTT 25.8 24.7 - 36.0 sec   TROPONIN    Collection Time: 09/06/17  2:27 AM   Result Value Ref Range    Troponin I 0.05 (H) 0.00 - 0.04 ng/mL   BTYPE NATRIURETIC PEPTIDE    Collection Time: 09/06/17  2:27 AM   Result Value Ref Range    B Natriuretic Peptide 18 0 - 100 pg/mL   ESTIMATED GFR    Collection Time: 09/06/17  2:27 AM   Result Value Ref Range    GFR If African American >60 >60 mL/min/1.73 m 2    GFR If Non African American >60 >60 mL/min/1.73 m 2       (click the triangle to expand results)    Imaging/Procedures Review:    DX-CHEST-PORTABLE (1 VIEW)   Final Result         1. Low lung volume with hypoventilatory change.      DX-CHEST-PORTABLE (1 VIEW)    (Results Pending)     EKG:   per my independant read:  HR: 47, Sinus Rhythm, no ST/T changes    Assessment and Plan:      Acute respiratory failure with hypoxia (CMS-HCC)- (present on admission)   Assessment & Plan    In ICU for intubation  Close monitor  qustionable aspiration in other hospital        Hyperglycemia- (present on admission)   Assessment & Plan    2/2 stress  Close monitor         Hypokalemia- (present on admission)   Assessment & Plan    K 3.2  Iv replace        AV block, 3rd degree (CMS-HCC)- (present on admission)   Assessment & Plan    Etiology unclear  Low K?  Need cardiologist consult in AM  Stable currenlty  currenlty transcutaneuos pad in place        Syncope and collapse- (present on admission)   Assessment & Plan    Echo ordered  Possible 3rd AVB  Need cardiology consult in AM        Essential hypertension, benign- (present on admission)   Assessment & Plan    Patient was on ACE inhibitor and diuretics  Blood pressure currently stable  Hold diuretics due to hypokalemia  Continue other medication  When necessary iv BBL            1. Prophylaxis: SCDs  2. Code: Full code per patient   3. Dispo: he will be admitted to inpatient for management that is expected to take more than 2 midnights   I have discussed patient admission status with  in the ER.    CRITICAL CARE    Patient currently is critically ill.    The very real possibilty of a deterioration of this patient's condition required the highest level of my preparedness for sudden, emergent intervention.  I provided critical care services, which included medication orders, frequent reevaluations of the patient's condition and response to treatment, ordering and reviewing test results, and discussing the case with various consultants.  The critical care time associated with the care of the patient was 55 minutes. Review chart for interventions. This time is exclusive of any other billable procedures.     I have discussed with RN and other consultants about patient's plan.       This dictation was created using voice recognition software. The accuracy of the dictation is limited to the abilities of the software. Although every efforts have been used to decrease the error, I expect there may be some errors of grammar and possibly content.

## 2017-09-06 NOTE — CARE PLAN
Problem: Urinary Elimination:  Goal: Ability to reestablish a normal urinary elimination pattern will improve  Outcome: PROGRESSING SLOWER THAN EXPECTED  Kennedy in place secondary to need for mechanical ventilation    Problem: Skin Integrity  Goal: Risk for impaired skin integrity will decrease  Outcome: PROGRESSING AS EXPECTED  Pt is moderate risk for skin breakdown. Pt is repositioned Q2H. Pillows are used for support, positioning and to float bony prominences. Skin assessment documented in doc flow sheet.

## 2017-09-06 NOTE — DIETARY
Nutrition Services: Consult metabolic cart study    Pt is a 62 y.o. Male with Dx: AV block, 3rd degree    Pt admitted this morning and was intubated. Now S/p pacemaker and pt has been extubated. Metabolic cart study no longer indicated. Cardiac diet order in place.     RD will monitor for needs.

## 2017-09-06 NOTE — OP REPORT
DATE OF SERVICE:  09/06/2017    TITLE OF THE PROCEDURE:  Diagnostic and therapeutic flexible fiberoptic   bronchoscopy with bronchoalveolar lavage.    INDICATION FOR THE PROCEDURE:  Aspiration, atelectasis.    POSTPROCEDURE DIAGNOSES:  1.  Normal endobronchial anatomy.  2.  No endobronchial tumor identified.  3.  Copious amounts of very thick yellowish brown secretions with chunks noted   in both lungs, consistent with aspiration.  All secretions, suctioned until   clear.  4.  Markedly inflamed airways in the left lower lobe, left upper lobe, lingula   and right lower lobe.    NARRATIVE:  The patient was sedated, intubated and ventilated at the time   of this procedure.  The flexible fiberoptic bronchoscope was inserted through   the lumen of the endotracheal tube and advanced into the distal trachea   without difficulty.  The airways were examined to the subsegmental bronchus   level bilaterally.  The endobronchial anatomy was normal.  No tumor was   identified.  There was copious amounts of very thick yellowish brown   secretions seen bilaterally with chunks noted, all consistent with gastric   aspiration.  All secretions were suctioned until clear.  The airways   throughout the left lung and the right lower lobe were markedly inflamed and   friable.  Bronchoalveolar lavage was carried out in the basilar segments of   the right lower lobe using the standard technique with good fluid return.    Bronchoalveolar lavage fluid is sent to the laboratory for cytology, Gram   stain, culture and sensitivity, acid fast bacilli smear and culture and fungal   culture.  The patient tolerated the procedure quite nicely.  No complications   are apparent.  His heart rate and rhythm, blood pressure and oxygen saturation   were continuously monitored.       ____________________________________     MD KAMRAN LI / YISEL    DD:  09/06/2017 04:02:04  DT:  09/06/2017 04:11:29    D#:  [id number]  Job#:  [id number]

## 2017-09-07 ENCOUNTER — APPOINTMENT (OUTPATIENT)
Dept: RADIOLOGY | Facility: MEDICAL CENTER | Age: 62
DRG: 242 | End: 2017-09-07
Attending: INTERNAL MEDICINE
Payer: COMMERCIAL

## 2017-09-07 VITALS
HEART RATE: 63 BPM | WEIGHT: 229.5 LBS | BODY MASS INDEX: 30.42 KG/M2 | HEIGHT: 73 IN | DIASTOLIC BLOOD PRESSURE: 65 MMHG | RESPIRATION RATE: 19 BRPM | TEMPERATURE: 99.2 F | SYSTOLIC BLOOD PRESSURE: 113 MMHG | OXYGEN SATURATION: 95 %

## 2017-09-07 PROBLEM — J96.01 ACUTE RESPIRATORY FAILURE WITH HYPOXIA (HCC): Status: RESOLVED | Noted: 2017-09-06 | Resolved: 2017-09-07

## 2017-09-07 PROBLEM — R55 SYNCOPE AND COLLAPSE: Status: RESOLVED | Noted: 2017-03-09 | Resolved: 2017-09-07

## 2017-09-07 PROBLEM — E87.6 HYPOKALEMIA: Status: RESOLVED | Noted: 2017-09-06 | Resolved: 2017-09-07

## 2017-09-07 PROBLEM — R73.9 HYPERGLYCEMIA: Status: RESOLVED | Noted: 2017-09-06 | Resolved: 2017-09-07

## 2017-09-07 LAB
ANION GAP SERPL CALC-SCNC: 6 MMOL/L (ref 0–11.9)
BASOPHILS # BLD AUTO: 0.3 % (ref 0–1.8)
BASOPHILS # BLD: 0.03 K/UL (ref 0–0.12)
BUN SERPL-MCNC: 13 MG/DL (ref 8–22)
CALCIUM SERPL-MCNC: 7.9 MG/DL (ref 8.5–10.5)
CHLORIDE SERPL-SCNC: 111 MMOL/L (ref 96–112)
CO2 SERPL-SCNC: 25 MMOL/L (ref 20–33)
CREAT SERPL-MCNC: 0.67 MG/DL (ref 0.5–1.4)
EKG IMPRESSION: NORMAL
EOSINOPHIL # BLD AUTO: 0.03 K/UL (ref 0–0.51)
EOSINOPHIL NFR BLD: 0.3 % (ref 0–6.9)
ERYTHROCYTE [DISTWIDTH] IN BLOOD BY AUTOMATED COUNT: 41.3 FL (ref 35.9–50)
GFR SERPL CREATININE-BSD FRML MDRD: >60 ML/MIN/1.73 M 2
GLUCOSE BLD-MCNC: 135 MG/DL (ref 65–99)
GLUCOSE SERPL-MCNC: 112 MG/DL (ref 65–99)
HCT VFR BLD AUTO: 40.7 % (ref 42–52)
HGB BLD-MCNC: 14.4 G/DL (ref 14–18)
IMM GRANULOCYTES # BLD AUTO: 0.05 K/UL (ref 0–0.11)
IMM GRANULOCYTES NFR BLD AUTO: 0.5 % (ref 0–0.9)
LYMPHOCYTES # BLD AUTO: 1.05 K/UL (ref 1–4.8)
LYMPHOCYTES NFR BLD: 11.3 % (ref 22–41)
MAGNESIUM SERPL-MCNC: 1.8 MG/DL (ref 1.5–2.5)
MCH RBC QN AUTO: 31.2 PG (ref 27–33)
MCHC RBC AUTO-ENTMCNC: 35.4 G/DL (ref 33.7–35.3)
MCV RBC AUTO: 88.3 FL (ref 81.4–97.8)
MONOCYTES # BLD AUTO: 0.5 K/UL (ref 0–0.85)
MONOCYTES NFR BLD AUTO: 5.4 % (ref 0–13.4)
NEUTROPHILS # BLD AUTO: 7.65 K/UL (ref 1.82–7.42)
NEUTROPHILS NFR BLD: 82.2 % (ref 44–72)
NRBC # BLD AUTO: 0 K/UL
NRBC BLD AUTO-RTO: 0 /100 WBC
PHOSPHATE SERPL-MCNC: 2.3 MG/DL (ref 2.5–4.5)
PLATELET # BLD AUTO: 135 K/UL (ref 164–446)
PMV BLD AUTO: 10.3 FL (ref 9–12.9)
POTASSIUM SERPL-SCNC: 3.2 MMOL/L (ref 3.6–5.5)
RBC # BLD AUTO: 4.61 M/UL (ref 4.7–6.1)
SODIUM SERPL-SCNC: 142 MMOL/L (ref 135–145)
WBC # BLD AUTO: 9.3 K/UL (ref 4.8–10.8)

## 2017-09-07 PROCEDURE — A9270 NON-COVERED ITEM OR SERVICE: HCPCS

## 2017-09-07 PROCEDURE — 700102 HCHG RX REV CODE 250 W/ 637 OVERRIDE(OP)

## 2017-09-07 PROCEDURE — A9270 NON-COVERED ITEM OR SERVICE: HCPCS | Performed by: INTERNAL MEDICINE

## 2017-09-07 PROCEDURE — 700105 HCHG RX REV CODE 258: Performed by: INTERNAL MEDICINE

## 2017-09-07 PROCEDURE — 700111 HCHG RX REV CODE 636 W/ 250 OVERRIDE (IP): Performed by: INTERNAL MEDICINE

## 2017-09-07 PROCEDURE — 80048 BASIC METABOLIC PNL TOTAL CA: CPT

## 2017-09-07 PROCEDURE — 700111 HCHG RX REV CODE 636 W/ 250 OVERRIDE (IP)

## 2017-09-07 PROCEDURE — 83735 ASSAY OF MAGNESIUM: CPT

## 2017-09-07 PROCEDURE — 93010 ELECTROCARDIOGRAM REPORT: CPT | Performed by: INTERNAL MEDICINE

## 2017-09-07 PROCEDURE — 700102 HCHG RX REV CODE 250 W/ 637 OVERRIDE(OP): Performed by: INTERNAL MEDICINE

## 2017-09-07 PROCEDURE — 99239 HOSP IP/OBS DSCHRG MGMT >30: CPT | Performed by: HOSPITALIST

## 2017-09-07 PROCEDURE — 71010 DX-CHEST-PORTABLE (1 VIEW): CPT

## 2017-09-07 PROCEDURE — 84100 ASSAY OF PHOSPHORUS: CPT

## 2017-09-07 PROCEDURE — 93005 ELECTROCARDIOGRAM TRACING: CPT | Performed by: INTERNAL MEDICINE

## 2017-09-07 PROCEDURE — 85025 COMPLETE CBC W/AUTO DIFF WBC: CPT

## 2017-09-07 RX ORDER — MAGNESIUM SULFATE HEPTAHYDRATE 40 MG/ML
2 INJECTION, SOLUTION INTRAVENOUS ONCE
Status: COMPLETED | OUTPATIENT
Start: 2017-09-07 | End: 2017-09-07

## 2017-09-07 RX ORDER — POTASSIUM CHLORIDE 20 MEQ/1
60 TABLET, EXTENDED RELEASE ORAL DAILY
Status: DISCONTINUED | OUTPATIENT
Start: 2017-09-07 | End: 2017-09-07 | Stop reason: HOSPADM

## 2017-09-07 RX ADMIN — ENOXAPARIN SODIUM 40 MG: 100 INJECTION SUBCUTANEOUS at 08:01

## 2017-09-07 RX ADMIN — AMPICILLIN SODIUM AND SULBACTAM SODIUM 3 G: 2; 1 INJECTION, POWDER, FOR SOLUTION INTRAMUSCULAR; INTRAVENOUS at 05:13

## 2017-09-07 RX ADMIN — MAGNESIUM SULFATE HEPTAHYDRATE 2 G: 40 INJECTION, SOLUTION INTRAVENOUS at 08:06

## 2017-09-07 RX ADMIN — ASPIRIN 81 MG: 81 TABLET, CHEWABLE ORAL at 07:59

## 2017-09-07 RX ADMIN — POTASSIUM CHLORIDE 60 MEQ: 1500 TABLET, EXTENDED RELEASE ORAL at 08:01

## 2017-09-07 RX ADMIN — FAMOTIDINE 20 MG: 20 TABLET, FILM COATED ORAL at 07:59

## 2017-09-07 RX ADMIN — STANDARDIZED SENNA CONCENTRATE AND DOCUSATE SODIUM 2 TABLET: 8.6; 5 TABLET, FILM COATED ORAL at 07:59

## 2017-09-07 ASSESSMENT — ENCOUNTER SYMPTOMS
WHEEZING: 0
CHILLS: 0
WEIGHT LOSS: 0
PSYCHIATRIC NEGATIVE: 1
LOSS OF CONSCIOUSNESS: 0
HEADACHES: 0
FEVER: 0
COUGH: 1
SHORTNESS OF BREATH: 0
SPUTUM PRODUCTION: 0
FOCAL WEAKNESS: 0
SORE THROAT: 0
ORTHOPNEA: 0
SPEECH CHANGE: 0
PALPITATIONS: 0
ABDOMINAL PAIN: 0
NEUROLOGICAL NEGATIVE: 1
DIZZINESS: 0
PND: 0
CLAUDICATION: 0
FLANK PAIN: 0
BLURRED VISION: 0
NAUSEA: 0
DOUBLE VISION: 0
VOMITING: 0
COUGH: 0
HEARTBURN: 0
MUSCULOSKELETAL NEGATIVE: 1
SEIZURES: 0

## 2017-09-07 ASSESSMENT — LIFESTYLE VARIABLES
ON A TYPICAL DAY WHEN YOU DRINK ALCOHOL HOW MANY DRINKS DO YOU HAVE: 1
ALCOHOL_USE: YES
AVERAGE NUMBER OF DAYS PER WEEK YOU HAVE A DRINK CONTAINING ALCOHOL: 5
HAVE YOU EVER FELT YOU SHOULD CUT DOWN ON YOUR DRINKING: NO
EVER FELT BAD OR GUILTY ABOUT YOUR DRINKING: NO
EVER_SMOKED: NEVER
EVER_SMOKED: NEVER
TOTAL SCORE: 1
TOTAL SCORE: 1
EVER HAD A DRINK FIRST THING IN THE MORNING TO STEADY YOUR NERVES TO GET RID OF A HANGOVER: NO
HAVE PEOPLE ANNOYED YOU BY CRITICIZING YOUR DRINKING: YES
TOTAL SCORE: 1
HOW MANY TIMES IN THE PAST YEAR HAVE YOU HAD 5 OR MORE DRINKS IN A DAY: 0
CONSUMPTION TOTAL: NEGATIVE

## 2017-09-07 ASSESSMENT — PAIN SCALES - GENERAL
PAINLEVEL_OUTOF10: 1
PAINLEVEL_OUTOF10: 2
PAINLEVEL_OUTOF10: 1
PAINLEVEL_OUTOF10: 2

## 2017-09-07 ASSESSMENT — PATIENT HEALTH QUESTIONNAIRE - PHQ9
2. FEELING DOWN, DEPRESSED, IRRITABLE, OR HOPELESS: NOT AT ALL
SUM OF ALL RESPONSES TO PHQ QUESTIONS 1-9: 0
SUM OF ALL RESPONSES TO PHQ9 QUESTIONS 1 AND 2: 0
1. LITTLE INTEREST OR PLEASURE IN DOING THINGS: NOT AT ALL

## 2017-09-07 NOTE — PROGRESS NOTES
MS:  SB-SR with prolonged WA 56-66  Post-pacemaker placement:  Partially paced SR with prolonged WA 68-84  0.24/0.14/0.42    12 Hour Chart Check

## 2017-09-07 NOTE — CARE PLAN
Problem: Safety  Goal: Will remain free from injury    Intervention: Provide assistance with mobility  RN using standby assistance to make sure pt is mobilizing safely.       Problem: Knowledge Deficit  Goal: Knowledge of disease process/condition, treatment plan, diagnostic tests, and medications will improve    Intervention: Assess knowledge level of disease process/condition, treatment plan, diagnostic tests, and medications  RN and MD at bedside discussing plan of care for the day. All questions answered and pt verbalizes understanding.

## 2017-09-07 NOTE — DISCHARGE INSTRUCTIONS
Discharge Instructions    Discharged to home by car with relative. Discharged via wheelchair, hospital escort: Yes.  Special equipment needed: Not Applicable    Be sure to schedule a follow-up appointment with your primary care doctor or any specialists as instructed.     Discharge Plan:   Diet Plan: Discussed  Activity Level: Discussed  Confirmed Follow up Appointment: Appointment Scheduled  Confirmed Symptoms Management: Discussed  Medication Reconciliation Updated: Yes  Influenza Vaccine Indication: Patient Refuses    I understand that a diet low in cholesterol, fat, and sodium is recommended for good health. Unless I have been given specific instructions below for another diet, I accept this instruction as my diet prescription.   Other diet: Heart Healthy    Special Instructions: None    · Is patient discharged on Warfarin / Coumadin?   No     · Is patient Post Blood Transfusion?  No    Depression / Suicide Risk    As you are discharged from this [facility] facility, it is important to learn how to keep safe from harming yourself.    Recognize the warning signs:  · Abrupt changes in personality, positive or negative- including increase in energy   · Giving away possessions  · Change in eating patterns- significant weight changes-  positive or negative  · Change in sleeping patterns- unable to sleep or sleeping all the time   · Unwillingness or inability to communicate  · Depression  · Unusual sadness, discouragement and loneliness  · Talk of wanting to die  · Neglect of personal appearance   · Rebelliousness- reckless behavior  · Withdrawal from people/activities they love  · Confusion- inability to concentrate     If you or a loved one observes any of these behaviors or has concerns about self-harm, here's what you can do:  · Talk about it- your feelings and reasons for harming yourself  · Remove any means that you might use to hurt yourself (examples: pills, rope, extension cords, firearm)  · Get  professional help from the community (Mental Health, Substance Abuse, psychological counseling)  · Do not be alone:Call your Safe Contact- someone whom you trust who will be there for you.  · Call your local CRISIS HOTLINE 056-3220 or 411-192-0753  · Call your local Children's Mobile Crisis Response Team [place] (417) 572-8095 or www.[employer]  · Call the toll free National Suicide Prevention Hotlines   · National Suicide Prevention Lifeline 895-405-CKOP (6849)  · [facility] 800-SUICIDE (303-2666)    Pacemaker Implantation, Care After  Refer to this sheet over the next few weeks. These instructions provide you with information on caring for yourself after the procedure. Your health care provider may also give you more specific instructions. Your treatment has been planned according to current medical practices, but problems sometimes occur. Call your health care provider if you have any problems or questions regarding your pacemaker.   WHAT TO EXPECT AFTER THE PROCEDURE  · You may feel pain. Some pain is normal. It may last a few days.  · A slight bump may be seen over the skin where the device was placed. Sometimes, it is possible to feel the device under the skin. This is normal.  · In the months and years afterward, your health care provider will check the device, the leads, and the battery every few months. Eventually, when the battery is low, the device will be replaced.  HOME CARE INSTRUCTIONS  Medicines  · Take medicines only as directed by your health care provider.  · If you were prescribed an antibiotic medicine, finish it all even if you start to feel better.  · Do not take any other medicines without asking your health care provider first. Some medicines, including certain painkillers, can cause bleeding in your stomach after surgery.  Wound Care  · Do not remove the bandage on your chest until directed to do so by your health care provider.  · After your bandage is removed,  you may see pieces of tape called skin adhesive strips over the area where the cut was made (incision site). Let them fall off on their own.  · Check the incision site every day to make sure it is not infected, bleeding, or starting to pull apart.  · Do not use lotions or ointments near the incision site unless directed to do so.  · Keep the incision area clean and dry for 2-3 days after the procedure or as directed by your health care provider. It takes several weeks for the incision site to completely heal.  · Do not take baths, swim, or use a hot tub until your health care provider approves.  Activities  · Try to walk a little every day. Exercising is important after this procedure. It is also important to use your shoulder on the side of the pacemaker in daily tasks that do not require exaggerated motion.  · Avoid sudden jerking, pulling, or chopping movements that pull your upper arm far away from your body for at least 6 weeks.  · Do not lift your upper arm above your shoulders for at least 6 weeks. This means no tennis, golf, or swimming for this period of time. If you sleep with the arm above your head, use a restraint to prevent this from happening as you sleep.  · You may go back to work when your health care provider says it is okay. Check with your health care provider before you start to drive or play sports.  Other Instructions  · Follow diet instructions if they were provided. You should be able to eat what you usually do right away, but you may need to limit your salt intake.  · Weigh yourself every day. If you suddenly gain weight, fluid may be building up in your body.  · Always carry your pacemaker identification card with you. The card should list the implant date, device model, and . Consider wearing a medical alert bracelet or necklace.  · Tell all health care providers that you have a pacemaker. This may prevent them from giving you a magnetic resource imaging scan (MRI) because of  the strong magnets used during that test.  · If you must pass through a metal detector, quickly walk through it. Do not stop under the detector or stand near it.  · Avoid places or objects with a strong electric or magnetic field, including:  ¨ Airport security dwyer. When at the airport, let officials know you have a pacemaker. Your ID card will let you be checked in a way that is safe for you and that will not damage your pacemaker. Also, do not let a security person wave a magnetic wand near your pacemaker. That can make it stop working.  ¨ Power plants.  ¨ Large electrical generators.  ¨ Radiofrequency transmission towers, such as cell phone and radio towers.  · Do not use amateur (ham) radio equipment or electric (arc) welding torches. Some devices are safe to use if held at least 1 foot from your pacemaker. These include power tools, lawn mowers, and speakers. If you are unsure of whether something is safe to use, ask your health care provider.  · You may safely use electric blankets, heating pads, computers, and microwave ovens.  · When using your cell phone, hold it to the ear opposite the pacemaker. Do not leave your cell phone in a pocket over the pacemaker.  · Keep all follow-up visits as directed by your health care provider. This is how your health care provider makes sure your chest is healing the way it should. Ask your health care provider when you should come back to have your stitches or staples taken out.  · Have your pacemaker checked every 3-6 months or as directed by your health care provider. Most pacemakers last for 4-8 years before a new one is needed.  SEEK MEDICAL CARE IF:  · You gain weight suddenly.  · Your legs or feet swell more than they have before.  · It feels like your heart is fluttering or skipping beats (heart palpitations).  · You have a fever.  SEEK IMMEDIATE MEDICAL CARE IF:  · You have chest pain.  · You feel more short of breath than you have felt before.  · You feel more  light-headed than you have felt before.  · You have problems with your incision site, such as swelling or bleeding, or it starts to open up.  · You have drainage, redness, swelling, or pain at your incision site.     This information is not intended to replace advice given to you by your health care provider. Make sure you discuss any questions you have with your health care provider.     Document Released: 07/07/2006 Document Revised: 01/08/2016 Document Reviewed: 04/19/2013  Elsevier Interactive Patient Education ©2016 [employer]

## 2017-09-07 NOTE — DISCHARGE SUMMARY
DATE OF DISCHARGE:  09/07/2017    The patient was seen and examined on 09/07/2017.  This note documents at the   encounter.    PRIMARY CARE PHYSICIAN:  Aurelio Cool MD    CARDIOLOGIST:  Brian Schroeder MD    DISCHARGE DIAGNOSES:  1.  Complete heart block, now status post pacemaker.  2.  Syncope related to complete heart block.  3.  Hypertension.  4.  Hypokalemia.  5.  Hypophosphatemia.  6.  Acute respiratory failure requiring intubation.  7.  Aspiration of gastric contents into the airway.    DISCHARGE MEDICATIONS:  1.  Aspirin 81 mg daily.  2.  Klor-Con 10 mEq twice daily.  3.  Niaspan 1000 mg twice daily.  4.  Ramipril 10 mg twice daily.    MAJOR STUDIES AND PROCEDURES PERFORMED WHILE INPATIENT:  1.  Bronchoscopy 09/06/2017, normal endobronchial anatomy, copious amounts of   thick yellow brown secretions suspicious for aspiration.  2.  Permanent pacemaker.    HOSPITAL COURSE:  This is a 62-year-old male who presented to an outside   hospital with acute onset of dizziness, diaphoresis and palpitations.  Patient   was found at the outside hospital be in a third degree heart block.  He did   not tolerate transcutaneous pacing.  Patient is intubated and sedated for   transcutaneous pacers, apparently the patient self extubated, had to be   reintubated with some concerns for aspiration at that time.  Patient was   transferred to [facility] for cardiology coverage.    Patient was seen in consultation by cardiology due to his high degree heart   block and symptoms, permanent pacemaker placement was recommended.  This   occurred on 09/06/2017 without complications.  Patient has recovered well.    There were no further signs of sinus pauses or bradycardia.  He is ambulatory.    Pain is well controlled.  He has been evaluated by both cardiology and   electrophysiology who recommends discharge home with close outpatient   followup.    Given the patient's electrolyte disturbances, I am  recommending that he stop   hydrochlorothiazide until he sees cardiology next week.  Patient will continue   with his ACE inhibitor for essential hypertension.    DISCHARGE DISPOSITION:  To home.    FOLLOWUP PLAN:  Follow up with electrophysiology in 1 week and appointment   will be scheduled.    DIET:  Regular as tolerated.    The patient was admitted critically ill, intubated with a complete heart   block.  He met criteria for inpatient status.  He has recovered more quickly   than expected and can be discharged home today.    Forty minutes was spent discharging this patient from the hospital.       ____________________________________     CEASAR CASANOVA MD AEF / NTS    DD:  09/07/2017 11:30:06  DT:  09/07/2017 12:20:40    D#:  [id number]  Job#:  [id number]

## 2017-09-07 NOTE — PROGRESS NOTES
Pulmonary Critical Care Progress Note        Date of service: 9/7/2017    Chief Complaint: Syncope    History of Present Illness: 62 y.o. male who apparently has a   history of systemic arterial hypertension, dyslipidemia, and chronic diastolic   heart failure with grade I diastolic dysfunction.  He was in the [place]   area.  He apparently became dizzy and diaphoretic.  EMS was activated.  He was   transported to the hospital in [place].  He was found to be in third   degree heart block.  He apparently experienced syncope, attempts were made to   externally pace him, but these were not successful.  He was found to have   significant hypokalemia with a potassium of 2.6 and his potassium was   repleted.  He was intubated.  Apparently following the initial intubation, he   vomited and he required repeat intubation and it was suspected at that time   that he aspirated.  He was then transported here to [facility].  He is now in the emergency department.      ROS:  Respiratory: positive cough, negative shortness of breath and positive sputum production, Cardiac: negative chest pain, negative orthopnea and negative leg swelling, GI: negative.  All other systems negative.    Interval Events:  24 hour interval history reviewed    - Underwent permanent pacemaker placement yesterday and extubated successfully afterwards   - Normal white blood cell count, afebrile, chest x-ray without obvious consolidation    PFSH:  No change.    Respiratory:   room air  Pulse Oximetry: 91 %          Exam: unlabored respirations, no intercostal retractions or accessory muscle use and clear to auscultation without rales or wheezes  ImagingCXR  I have personally reviewed the chest x-ray my impression is  minimal bibasilar atelectasis with enlarged cardiac silhouette, newly placed right sided pacemaker noted without pneumothorax  Recent Labs      09/06/17   0526   ISTATAPH  7.378*   ISTATAPCO2  45.9*   ISTATAPO2  74    ISTATATCO2  28   [id number]  94   ISTATARTHCO3  27.0*   ISTATARTBE  1   ISTATTEMP  see below   ISTATFIO2  40   ISTATSPEC  Arterial   ABG:None today    HemoDynamics:  Pulse: 68, Heart Rate (Monitored): 68  NIBP: 132/78       Exam: intermittently paced, regular rate and rhythm, first degree heart block, no murmur  Imaging: Available data reviewed  Recent Labs      09/06/17   0227   TROPONINI  0.05*   BNPBTYPENAT  18       Neuro:  GCS Total Severino Coma Score: 15        Exam: no focal deficits noted mental status intact oriented for age x3  Imaging: Available data reviewed    Fluids:  Intake/Output       09/05/17 0700 - 09/06/17 0659 (Not Admitted) 09/06/17 0700 - 09/07/17 0659 09/07/17 0700 - 09/08/17 0659      8092-7375 9705-0384 Total 3334-7103 3142-5647 Total 7170-2485 8851-5579 Total       Intake    P.O.  --  -- --  120  900 1020  --  -- --    P.O. -- -- --  -- -- --    I.V.  --  -- --  1227.4  1113 2340.4  --  -- --    Propofol Volume -- -- -- 263.4 -- 263.4 -- -- --    IV Piggyback Volume (IV Piggyback) -- -- -- 300 200 500 -- -- --    IV Volume (NS) -- -- --  -- -- --    Other  --  -- --  30  -- 30  --  -- --    Medications (P.O./ Enteral Liquids) -- -- -- 30 -- 30 -- -- --    Enteral  --  -- --  30  -- 30  --  -- --    Free Water / Tube Flush -- -- -- 30 -- 30 -- -- --    Total Intake -- -- -- 1407.4 2013 3420.4 -- -- --       Output    Urine  --  -- --  1300  300 1600  --  -- --    Number of Times Voided -- -- -- 2 x 4 x 6 x -- -- --    Void (ml) -- -- -- 9260 966 7474 -- -- --    Stool  --  -- --  --  -- --  --  -- --    Number of Times Stooled -- -- -- -- 0 x 0 x -- -- --    Total Output -- -- -- 5619 629 4404 -- -- --       Net I/O     -- -- -- 107.4 1713 1820.4 -- -- --        Weight: 104.1 kg (229 lb 8 oz)  Recent Labs      09/06/17 0227 09/07/17   0523   SODIUM  138  142   POTASSIUM  3.3*  3.2*   CHLORIDE  106  111   CO2  24  25   BUN  16  13   CREATININE  0.79  0.67    MAGNESIUM   --   1.8   PHOSPHORUS   --   2.3*   CALCIUM  8.7  7.9*       GI/Nutrition:  Exam: abdomen is soft and non-tender, normal active bowel sounds  Imaging: Available data reviewed  taking PO  Liver Function  Recent Labs      17   0523   ALTSGPT  23   --    ASTSGOT  33   --    ALKPHOSPHAT  68   --    TBILIRUBIN  1.0   --    GLUCOSE  150*  112*       Heme:  Recent Labs      17   RBC  5.35  4.61*   HEMOGLOBIN  16.5  14.4   HEMATOCRIT  46.0  40.7*   PLATELETCT  198  135*   PROTHROMBTM  13.8   --    APTT  25.8   --    INR  1.03   --        Infectious Disease:  Monitored Temp  Av.1 °C (100.6 °F)  Min: 37.6 °C (99.7 °F)  Max: 38.6 °C (101.5 °F)  Temp  Av.5 °C (99.5 °F)  Min: 37.2 °C (99 °F)  Max: 37.6 °C (99.7 °F)  Micro: reviewed  Recent Labs      17   WBC  9.5  9.3   NEUTSPOLYS  90.30*  82.20*   LYMPHOCYTES  6.80*  11.30*   MONOCYTES  2.20  5.40   EOSINOPHILS  0.10  0.30   BASOPHILS  0.10  0.30   ASTSGOT  33   --    ALTSGPT  23   --    ALKPHOSPHAT  68   --    TBILIRUBIN  1.0   --      Current Facility-Administered Medications   Medication Dose Frequency Provider Last Rate Last Dose   • potassium chloride SA (Kdur) tablet 60 mEq  60 mEq DAILY René Herrera, PharmD       • magnesium sulfate IVPB premix 2 g  2 g Once René Herrera, PharmD       • Respiratory Care per Protocol   Continuous RT Man Marshall M.D.       • senna-docusate (PERICOLACE or SENOKOT S) 8.6-50 MG per tablet 2 Tab  2 Tab BID Man Marshall M.D.   Stopped at 17 0900    And   • polyethylene glycol/lytes (MIRALAX) PACKET 1 Packet  1 Packet QDAY PRN Man Marshall M.D.        And   • magnesium hydroxide (MILK OF MAGNESIA) suspension 30 mL  30 mL QDAY PRN Man Marshall M.D.        And   • bisacodyl (DULCOLAX) suppository 10 mg  10 mg QDAY PRN Man Marshall M.D.       • lidocaine (XYLOCAINE) 1%  injection   1-2 mL Q30 MIN PRN Man Marshall M.D.       • MD ALERT...Adult ICU Electrolyte Replacement per Pharmacy Protocol   pharmacy to dose Man Marshall M.D.       • NS infusion   Continuous Man Marshall M.D. 83 mL/hr at 09/06/17 1730     • enoxaparin (LOVENOX) inj 40 mg  40 mg DAILY Man Marshall M.D.   Stopped at 09/06/17 0900   • fentaNYL (SUBLIMAZE) injection 25 mcg  25 mcg Q HOUR PRN Man Marshall M.D.        Or   • fentaNYL (SUBLIMAZE) injection 50 mcg  50 mcg Q HOUR PRN Man Marshall M.D.        Or   • fentaNYL (SUBLIMAZE) injection 100 mcg  100 mcg Q HOUR PRN Man Marshall M.D.       • fentaNYL (SUBLIMAZE) 50 mcg/mL in 50mL   Continuous Man Marshall M.D.   Stopped at 09/06/17 0345   • ipratropium-albuterol (DUONEB) nebulizer solution 3 mL  3 mL Q2HRS PRN (RT) Man Marshall M.D.       • famotidine (PEPCID) tablet 20 mg  20 mg Q12HRS Man Marshall M.D.   20 mg at 09/06/17 2049    Or   • famotidine (PEPCID) injection 20 mg  20 mg Q12HRS Man Marshall M.D.   20 mg at 09/06/17 1037   • insulin lispro (HUMALOG) injection 2-9 Units  2-9 Units Q6HRS Man Marshall M.D.   Stopped at 09/06/17 0600   • glucose 4 g chewable tablet 16 g  16 g Q15 MIN PRN Man Marshall M.D.        And   • dextrose 50% (D50W) injection 25 mL  25 mL Q15 MIN PRN Man Marshall M.D.       • propofol (DIPRIVAN) injection  0-80 mcg/kg/min Continuous Man Marshall M.D.   Stopped at 09/06/17 1540   • aspirin (ASA) chewable tab 81 mg  81 mg DAILY Rosita Dave M.D.   Stopped at 09/06/17 0900   • acetaminophen (TYLENOL) tablet 650 mg  650 mg Q6HRS PRN Rosita Dave M.D.   650 mg at 09/06/17 1137   • ampicillin/sulbactam (UNASYN) 3 g in  mL IVPB  3 g Q6HRS Man Marshall M.D.   Stopped at 09/07/17 0543   • Pharmacy Consult Request ...Pain Management Review 1 Each  1 Each PRN Nani Chahal M.D.         Last reviewed on  9/6/2017  8:39 AM by Neha Law    Quality  Measures:  Medications reviewed, Labs reviewed and Radiology images reviewed  Kennedy catheter: No Kennedy      DVT Prophylaxis: Enoxaparin (Lovenox)  DVT prophylaxis - mechanical: Not indicated at this time, ambulatory  Ulcer prophylaxis: Not indicated  Antibiotics: Treating active infection/contamination beyond 24 hours perioperative coverage  Assessed for rehab: Patient returned to prior level of function, rehabilitation not indicated at this time      Assessment/Plan:  Acute hypoxic respiratory failure - intubated at OSH 9/5, extubated 9/6 postprocedure  Third-degree atrioventricular block with associated syncope S/P PPM 9/7   - cardiology following, outpatient follow-up in 1 week  Aspiration pneumonitis s/p therapeutic and diagnostic BAL 9/6   - Discontinue antibiotics, no sign of pneumonia  Hypokalemia - repletion  Systemic arterial hypertension - resume home medications  Dyslipidemia - statin  Chronic diastolic heart failure with grade I diastolic dysfunction.  Prophylaxis, diet    Okay to discharge home from pulmonary/critical care standpoint. No need for outpatient pulmonary follow-up.    Discussed patient condition with Family, RN, RT, Pharmacy, Charge nurse / hot rounds, Patient and cardiology and hospitalist.

## 2017-09-07 NOTE — PROGRESS NOTES
Received report and assumed care. Family at bedside. Pt resting comfortably in bed, states slight sore throat and is ready for dinner but no other discomfort at this time. Kitchen was called again for his dinner. Family left to get him dinner from the cafeteria. VSS. Bed in low and locked position, call light within reach.

## 2017-09-07 NOTE — PROGRESS NOTES
Cardiology Progress Note               Author: Luzagnes Garcia Date & Time created: 9/7/2017  7:45 AM     Interval History:  Patient seen on EPS rounds.  Underwent dual chamber pacemaker insertion with Dr Chahal yesterday.  History of Present Illness:    Ruebn is a 62 y.o. male w/h/o HTN who presents with 3rd AVB and need intubation. Per EMS report, pt was in hotel room and had sudden onset of dizziness and was diaphoretic. On EMS arrival, pt was in 3rd AVB. EMS paced pt, but was unable to capture and brought to other hospital . Pt continued to have syncopal episodes and was intubated on the hospital. Patient also vomited and concern of aspiration so patient was reintubated by our hospitalist. Patient was put on transcutaneous   pacing pads around the hospital. Patient currently is intubated and is unable to provide medical history. Medical history was obtained by chart review and discussed with the ERP.    CXR FINDINGS:  Interval extubation and removal of gastric drainage tube.  Right-sided pacemaker in place.  Diffuse interstitial prominence. Minimal bibasilar opacities..  No pleural effusion. No pneumothorax.  Normal cardiopericardial silhouette.    Interrogation of device is intact and comparable to implant data.  Review of Systems   Constitutional: Negative for chills, fever, malaise/fatigue and weight loss.   HENT: Negative for congestion and sore throat.    Eyes: Negative for blurred vision and double vision.   Respiratory: Negative for cough, sputum production, shortness of breath and wheezing.    Cardiovascular: Positive for chest pain (Aching of right shoulder post pacemaker insertion.). Negative for palpitations, orthopnea, claudication, leg swelling and PND.   Gastrointestinal: Negative for abdominal pain, heartburn, nausea and vomiting.   Genitourinary: Negative for dysuria, flank pain and frequency.   Musculoskeletal: Negative.    Skin: Negative.    Neurological: Negative for dizziness, speech change,  focal weakness, seizures, loss of consciousness and headaches.   Endo/Heme/Allergies: Negative.    Psychiatric/Behavioral: Negative.        Physical Exam   Constitutional: He is oriented to person, place, and time. He appears well-developed and well-nourished.   HENT:   Head: Normocephalic and atraumatic.   Eyes: Pupils are equal, round, and reactive to light.   Neck: Normal range of motion. Neck supple. No thyromegaly present.   Cardiovascular: Normal rate and regular rhythm.  Exam reveals no gallop and no friction rub.    No murmur heard.  Pulmonary/Chest: Effort normal and breath sounds normal. No respiratory distress. He has no wheezes. He has no rales.   Device site is uncomplicated.   Abdominal: Soft. Bowel sounds are normal. He exhibits no distension. There is no tenderness. There is no guarding.   Musculoskeletal: Normal range of motion. He exhibits no edema.   Neurological: He is alert and oriented to person, place, and time.   Skin: Skin is warm and dry.   Psychiatric: He has a normal mood and affect.       Hemodynamics:  Temp (24hrs), Av.5 °C (99.5 °F), Min:37.2 °C (99 °F), Max:37.6 °C (99.7 °F)  Temperature: 37.2 °C (99 °F), Monitored Temp: 37.6 °C (99.7 °F)  Pulse  Av.2  Min: 47  Max: 87Heart Rate (Monitored): 68  NIBP: 132/78     Respiratory:  Zamora Vent Mode: APVCMV, Rate (breaths/min): 20, PEEP/CPAP: 8, FiO2: 40, P Peak (PIP): 17, P MEAN: 11 Respiration: 17, Pulse Oximetry: 91 %     Work Of Breathing / Effort: Vented  RUL Breath Sounds: Clear, RML Breath Sounds: Clear, RLL Breath Sounds: Diminished, ELMER Breath Sounds: Clear, LLL Breath Sounds: Diminished  Fluids:     Weight: 104.1 kg (229 lb 8 oz)  GI/Nutrition:  Orders Placed This Encounter   Procedures   • Diet Order     Standing Status:   Standing     Number of Occurrences:   1     Order Specific Question:   Diet:     Answer:   Cardiac [6]     Lab Results:  Recent Labs      17   0227  17   0523   WBC  9.5  9.3   RBC  5.35   4.61*   HEMOGLOBIN  16.5  14.4   HEMATOCRIT  46.0  40.7*   MCV  86.0  88.3   MCH  30.8  31.2   MCHC  35.9*  35.4*   RDW  38.8  41.3   PLATELETCT  198  135*   MPV  10.1  10.3     Recent Labs      09/06/17 0227  09/07/17   0523   SODIUM  138  142   POTASSIUM  3.3*  3.2*   CHLORIDE  106  111   CO2  24  25   GLUCOSE  150*  112*   BUN  16  13   CREATININE  0.79  0.67   CALCIUM  8.7  7.9*     Recent Labs      09/06/17 0227   APTT  25.8   INR  1.03     Recent Labs      09/06/17 0227   BNPBTYPENAT  18     Recent Labs      09/06/17 0227   TROPONINI  0.05*   BNPBTYPENAT  18             Medical Decision Making, by Problem:  Active Hospital Problems    Diagnosis   • AV block, 3rd degree (CMS-[facility]) [I44.2]   • Hypokalemia [E87.6]   • Hyperglycemia [R73.9]   • Acute respiratory failure with hypoxia (CMS-[facility]) [J96.01]   • Syncope and collapse [R55]   • Essential hypertension, benign [I10]       Plan:  CXR stable no late Ptx.  Interrogation of device is intact. Thresholds stable and sensing intact.  Device site uncomplicated.  Arm restricted activities reviewed.  Will arrange for device follow up in 1 week in our office.  EPS will sign off .  Thank you for this consultation.  Quality-Core Measures

## 2017-09-07 NOTE — PROGRESS NOTES
Cardiology Progress Note               Author: Brian Schroeder Date & Time created: 2017  6:38 AM     Interval History:  Transferred from [place] yesterday with CHB nd respiratory failure.  Pacer placed yesterday and patient extubated yesterday afteroon.  Mild cough.  Alert today.    Review of Systems   Respiratory: Positive for cough.    Cardiovascular: Negative for chest pain.   Neurological: Negative.    Endo/Heme/Allergies: Negative.        Physical Exam   Constitutional: He is oriented to person, place, and time. No distress.   HENT:   Head: Normocephalic and atraumatic.   Eyes: No scleral icterus.   Cardiovascular: Normal rate and regular rhythm.    No murmur heard.  Pulmonary/Chest: Breath sounds normal. No respiratory distress. He has no wheezes. He has no rales.   Musculoskeletal: Normal range of motion. He exhibits no edema.   Neurological: He is alert and oriented to person, place, and time.   Skin: Skin is warm and dry.       Hemodynamics:  Temp (24hrs), Av.5 °C (99.5 °F), Min:37.2 °C (99 °F), Max:37.6 °C (99.7 °F)  Temperature: 37.2 °C (99 °F), Monitored Temp: 37.6 °C (99.7 °F)  Pulse  Av.2  Min: 47  Max: 87Heart Rate (Monitored): 68  NIBP: 132/78     Respiratory:  Zamora Vent Mode: APVCMV, Rate (breaths/min): 20, PEEP/CPAP: 8, FiO2: 40, P Peak (PIP): 17, P MEAN: 11 Respiration: 17, Pulse Oximetry: 91 %     Work Of Breathing / Effort: Vented  RUL Breath Sounds: Clear, RML Breath Sounds: Clear, RLL Breath Sounds: Diminished, ELMER Breath Sounds: Clear, LLL Breath Sounds: Diminished  Fluids:     Weight: 104.1 kg (229 lb 8 oz)  GI/Nutrition:  Orders Placed This Encounter   Procedures   • Diet Order     Standing Status:   Standing     Number of Occurrences:   1     Order Specific Question:   Diet:     Answer:   Cardiac [6]     Lab Results:  Recent Labs      17   0227  17   0523   WBC  9.5  9.3   RBC  5.35  4.61*   HEMOGLOBIN  16.5  14.4   HEMATOCRIT  46.0  40.7*   MCV  86.0  88.3    MCH  30.8  31.2   MCHC  35.9*  35.4*   RDW  38.8  41.3   PLATELETCT  198  135*   MPV  10.1  10.3     Recent Labs      09/06/17 0227  09/07/17   0523   SODIUM  138  142   POTASSIUM  3.3*  3.2*   CHLORIDE  106  111   CO2  24  25   GLUCOSE  150*  112*   BUN  16  13   CREATININE  0.79  0.67   CALCIUM  8.7  7.9*     Recent Labs      09/06/17 0227   APTT  25.8   INR  1.03     Recent Labs      09/06/17 0227   BNPBTYPENAT  18     Recent Labs      09/06/17 0227   TROPONINI  0.05*   BNPBTYPENAT  18             Medical Decision Making, by Problem:  Active Hospital Problems    Diagnosis   • AV block, 3rd degree (CMS-[facility]) [I44.2]   • Hypokalemia [E87.6]   • Hyperglycemia [R73.9]   • Acute respiratory failure with hypoxia (CMS-[facility]) [J96.01]   • Syncope and collapse [R55]   • Essential hypertension, benign [I10]       Plan:  Post pacer CXR shows appropriate lead placement, no infiltrate and no pneumothorax.  Stable for cardiac perspective.  Could be discharged today if pulmonary concurs.    Quality-Core Measures

## 2017-09-07 NOTE — DISCHARGE PLANNING
SW attended IDT rounds and pt had pacemaker surgery yesterday and is no longer extubated. Pt is anticipated to discharge home today with no needs.

## 2017-09-07 NOTE — CARE PLAN
Problem: Safety  Goal: Will remain free from injury  Outcome: PROGRESSING AS EXPECTED  Pt provided assistance to restroom. Pt provided education on safety, fall prevention, and use of call light. Treaded socks in place, call light within reach, hourly rounding in effect. Sling in place on Right arm due to pacemaker placement today in Right subclavian area.      Problem: Skin Integrity  Goal: Risk for impaired skin integrity will decrease  Outcome: PROGRESSING AS EXPECTED  Pt risk factors for impaired skin integrity assessed, Lincoln score documented in flowsheet. Mepilex placed, heel float boots and pillows in use for support and positioning, pt turns self side to side, hourly rounding in effect.

## 2017-09-07 NOTE — PROGRESS NOTES
Pt discharged per MD's instructions. IV removed with tip intact. Dressing was applied. Monitor was d/c'd. All instructions were given and all questions were answered.  All pt belongins including phone and wallet were with pt at time of discharge.

## 2017-09-08 LAB
BACTERIA SPEC RESP CULT: ABNORMAL
GRAM STN SPEC: ABNORMAL
SIGNIFICANT IND 70042: ABNORMAL
SITE SITE: ABNORMAL
SOURCE SOURCE: ABNORMAL

## 2017-09-13 ENCOUNTER — NON-PROVIDER VISIT (OUTPATIENT)
Dept: CARDIOLOGY | Facility: MEDICAL CENTER | Age: 62
End: 2017-09-13
Payer: COMMERCIAL

## 2017-09-13 DIAGNOSIS — I44.2 AV BLOCK, 3RD DEGREE (HCC): ICD-10-CM

## 2017-09-13 PROCEDURE — 93280 PM DEVICE PROGR EVAL DUAL: CPT | Performed by: INTERNAL MEDICINE

## 2017-09-13 NOTE — NON-PROVIDER
S/p new pacemaker, implanted on 9-6-2017. Appropriate device function demonstrated. Wound site appears clear. Advised to watch for redness, swelling, oozing or fever. Pt verbalized understanding. See back in 5 weeks for final testing.

## 2017-10-04 LAB
FUNGUS SPEC CULT: NORMAL
SIGNIFICANT IND 70042: NORMAL
SITE SITE: NORMAL
SOURCE SOURCE: NORMAL

## 2017-10-09 ENCOUNTER — APPOINTMENT (OUTPATIENT)
Dept: SOCIAL WORK | Facility: CLINIC | Age: 62
End: 2017-10-09

## 2017-10-09 PROCEDURE — 90686 IIV4 VACC NO PRSV 0.5 ML IM: CPT | Performed by: REGISTERED NURSE

## 2017-10-10 ENCOUNTER — NON-PROVIDER VISIT (OUTPATIENT)
Dept: CARDIOLOGY | Facility: MEDICAL CENTER | Age: 62
End: 2017-10-10
Payer: COMMERCIAL

## 2017-10-10 ENCOUNTER — OFFICE VISIT (OUTPATIENT)
Dept: CARDIOLOGY | Facility: MEDICAL CENTER | Age: 62
End: 2017-10-10
Payer: COMMERCIAL

## 2017-10-10 VITALS
DIASTOLIC BLOOD PRESSURE: 80 MMHG | WEIGHT: 211 LBS | HEIGHT: 73 IN | OXYGEN SATURATION: 98 % | HEART RATE: 67 BPM | BODY MASS INDEX: 27.96 KG/M2 | SYSTOLIC BLOOD PRESSURE: 120 MMHG

## 2017-10-10 DIAGNOSIS — I44.2 AV BLOCK, 3RD DEGREE (HCC): ICD-10-CM

## 2017-10-10 DIAGNOSIS — Z95.0 CARDIAC PACEMAKER IN SITU: ICD-10-CM

## 2017-10-10 PROCEDURE — 93288 INTERROG EVL PM/LDLS PM IP: CPT | Performed by: NURSE PRACTITIONER

## 2017-10-10 PROCEDURE — 99213 OFFICE O/P EST LOW 20 MIN: CPT | Performed by: NURSE PRACTITIONER

## 2017-10-10 ASSESSMENT — ENCOUNTER SYMPTOMS
CHILLS: 0
NAUSEA: 0
HEADACHES: 0
MYALGIAS: 0
PND: 0
PALPITATIONS: 0
ABDOMINAL PAIN: 0
LOSS OF CONSCIOUSNESS: 0
DIZZINESS: 0
BRUISES/BLEEDS EASILY: 0
COUGH: 0
ORTHOPNEA: 0
FEVER: 0
SHORTNESS OF BREATH: 0

## 2017-10-10 NOTE — PROGRESS NOTES
Subjective:   Sanya Miranda is a 62 y.o. male who presents today for hospital FU of PM implantation for AV block.    Sanya is a 62 year old male with history of hypertension, normally followed by Dr. Espinal. In early September 2017, he was hospitalized for dizziness and presyncope. EKG showed complete AV block, with pauses up to 7 seconds. PM was recommended and implanted on 9/6/2017, and he did well.    He is here today for follow-up. He is feeling well, and is back at work. No chest pain, pressure or discomfort; no palpitations; no shortness of breath, orthopnea or PND; no dizzinesss or syncope; no edema. BP is good.    Past Medical History:   Diagnosis Date   • AV block, 3rd degree (CMS-HCC) 09/2017    Status post PM implantation   • Hypertension      Past Surgical History:   Procedure Laterality Date   • PACEMAKER INSERTION Left 09/2017    [employer] MRI 2272 implanted by Dr. Chahal.     Family History   Problem Relation Age of Onset   • Heart Disease Mother      CABG at age late 60   • Hypertension Mother    • Heart Disease Father      CABG at age 83   • Hypertension Father      History   Smoking Status   • Never Smoker   Smokeless Tobacco   • Never Used     Allergies   Allergen Reactions   • Sulfa Drugs Hives     Outpatient Encounter Prescriptions as of 10/10/2017   Medication Sig Dispense Refill   • KLOR-CON 10 10 MEQ tablet TAKE 1 TAB BY MOUTH 2 TIMES A DAY. 60 Tab 11   • ramipril (ALTACE) 10 MG capsule Take 10 mg by mouth 2 Times a Day.     • aspirin 81 MG tablet Take 81 mg by mouth every day.     • NIASPAN 1000 MG PO TBCR Take 1000 mg by mouth 2 Times a Day.        No facility-administered encounter medications on file as of 10/10/2017.      Review of Systems   Constitutional: Negative for chills and fever.   HENT: Negative for congestion.    Respiratory: Negative for cough and shortness of breath.    Cardiovascular: Negative for chest pain, palpitations, orthopnea, leg swelling and PND.  "  Gastrointestinal: Negative for abdominal pain and nausea.   Musculoskeletal: Negative for myalgias.   Skin: Negative for rash.   Neurological: Negative for dizziness, loss of consciousness and headaches.   Endo/Heme/Allergies: Does not bruise/bleed easily.        Objective:   /80   Pulse 67   Ht 1.854 m (6' 1\")   Wt 95.7 kg (211 lb)   SpO2 98%   BMI 27.84 kg/m²     Physical Exam   Constitutional: He is oriented to person, place, and time. He appears well-developed and well-nourished. No distress.   HENT:   Head: Normocephalic.   Eyes: Conjunctivae and EOM are normal.   Neck: Normal range of motion. Neck supple. No JVD present.   Cardiovascular: Normal rate, regular rhythm, normal heart sounds and intact distal pulses.  Exam reveals no gallop and no friction rub.    No murmur heard.  Pulmonary/Chest: Effort normal and breath sounds normal.   PM in left chest wall. Incision is well healed.   Abdominal: Soft. Bowel sounds are normal.   Musculoskeletal: Normal range of motion. He exhibits no edema.   Neurological: He is alert and oriented to person, place, and time.   Skin: Skin is warm and dry.   Psychiatric: He has a normal mood and affect. His behavior is normal.     PM is working normally. No mode switching. RA and RV outputs are decreased to 2.5 volts.    Lab Results   Component Value Date/Time    WBC 9.3 09/07/2017 05:23 AM    RBC 4.61 (L) 09/07/2017 05:23 AM    HEMOGLOBIN 14.4 09/07/2017 05:23 AM    HEMATOCRIT 40.7 (L) 09/07/2017 05:23 AM    MCV 88.3 09/07/2017 05:23 AM    MCH 31.2 09/07/2017 05:23 AM    MCHC 35.4 (H) 09/07/2017 05:23 AM    MPV 10.3 09/07/2017 05:23 AM      Lab Results   Component Value Date/Time    SODIUM 142 09/07/2017 05:23 AM    POTASSIUM 3.2 (L) 09/07/2017 05:23 AM    CHLORIDE 111 09/07/2017 05:23 AM    CO2 25 09/07/2017 05:23 AM    GLUCOSE 112 (H) 09/07/2017 05:23 AM    BUN 13 09/07/2017 05:23 AM    CREATININE 0.67 09/07/2017 05:23 AM    BUNCREATRAT 21 02/17/2016 10:20 AM "       Assessment:     1. Cardiac pacemaker in situ     2. AV block, 3rd degreeSeptember 2017: [employer] MRI 2272 implanted by Dr. Chahal.   (CMS-HCC)         Medical Decision Making:  Today's Assessment / Status / Plan:     1. High AV block with PPM. Device is working normally. RA and RV outputs are lowered to 2.5 volts.    2. Hypertension, treated and stable.    Same medications for now. FU in 3 months for next PM check. FU sooner if clinical condition changes.    Collaborating MD: Vipin

## 2017-10-10 NOTE — LETTER
[facility]   [address]   [address]  [place] [address]  Phone: [phone]  Fax: [phone]              Sanya Miranda  1955    Encounter Date: 10/10/2017    MEE Holbrook          PROGRESS NOTE:  No notes on file      No Recipients

## 2017-10-11 ENCOUNTER — TELEPHONE (OUTPATIENT)
Dept: CARDIOLOGY | Facility: MEDICAL CENTER | Age: 62
End: 2017-10-11

## 2017-10-11 NOTE — TELEPHONE ENCOUNTER
Patient saw Anna yesterday, 10/10/17 she filled out is [place] return to work paperwork but forgot one date. Patient stopped by [place] today, 10/11/17 to have Anna fill out the final date. Sent paperwork to [employer] in [place], Anna filled out date and [employer] faxed paperwork back to patients employers HR department. I called patient and informed him that paperwork was completed and faxed.

## 2017-11-01 LAB
MYCOBACTERIUM SPEC CULT: NORMAL
RHODAMINE-AURAMINE STN SPEC: NORMAL
SIGNIFICANT IND 70042: NORMAL
SITE SITE: NORMAL
SOURCE SOURCE: NORMAL

## 2017-12-29 ENCOUNTER — HOSPITAL ENCOUNTER (OUTPATIENT)
Dept: LAB | Facility: MEDICAL CENTER | Age: 62
End: 2017-12-29
Attending: FAMILY MEDICINE
Payer: COMMERCIAL

## 2017-12-29 LAB
ALBUMIN SERPL BCP-MCNC: 4.1 G/DL (ref 3.2–4.9)
ALBUMIN/GLOB SERPL: 1.6 G/DL
ALP SERPL-CCNC: 77 U/L (ref 30–99)
ALT SERPL-CCNC: 21 U/L (ref 2–50)
ANION GAP SERPL CALC-SCNC: 3 MMOL/L (ref 0–11.9)
AST SERPL-CCNC: 24 U/L (ref 12–45)
BASOPHILS # BLD AUTO: 0.8 % (ref 0–1.8)
BASOPHILS # BLD: 0.04 K/UL (ref 0–0.12)
BILIRUB SERPL-MCNC: 0.9 MG/DL (ref 0.1–1.5)
BUN SERPL-MCNC: 20 MG/DL (ref 8–22)
CALCIUM SERPL-MCNC: 9.6 MG/DL (ref 8.5–10.5)
CHLORIDE SERPL-SCNC: 105 MMOL/L (ref 96–112)
CHOLEST SERPL-MCNC: 170 MG/DL (ref 100–199)
CO2 SERPL-SCNC: 31 MMOL/L (ref 20–33)
CREAT SERPL-MCNC: 0.86 MG/DL (ref 0.5–1.4)
EOSINOPHIL # BLD AUTO: 0.08 K/UL (ref 0–0.51)
EOSINOPHIL NFR BLD: 1.5 % (ref 0–6.9)
ERYTHROCYTE [DISTWIDTH] IN BLOOD BY AUTOMATED COUNT: 39 FL (ref 35.9–50)
GFR SERPL CREATININE-BSD FRML MDRD: >60 ML/MIN/1.73 M 2
GLOBULIN SER CALC-MCNC: 2.5 G/DL (ref 1.9–3.5)
GLUCOSE SERPL-MCNC: 115 MG/DL (ref 65–99)
HCT VFR BLD AUTO: 48.9 % (ref 42–52)
HDLC SERPL-MCNC: 51 MG/DL
HGB BLD-MCNC: 17.5 G/DL (ref 14–18)
IMM GRANULOCYTES # BLD AUTO: 0.03 K/UL (ref 0–0.11)
IMM GRANULOCYTES NFR BLD AUTO: 0.6 % (ref 0–0.9)
LDLC SERPL CALC-MCNC: 94 MG/DL
LYMPHOCYTES # BLD AUTO: 1.51 K/UL (ref 1–4.8)
LYMPHOCYTES NFR BLD: 28.6 % (ref 22–41)
MCH RBC QN AUTO: 31 PG (ref 27–33)
MCHC RBC AUTO-ENTMCNC: 35.8 G/DL (ref 33.7–35.3)
MCV RBC AUTO: 86.7 FL (ref 81.4–97.8)
MONOCYTES # BLD AUTO: 0.42 K/UL (ref 0–0.85)
MONOCYTES NFR BLD AUTO: 8 % (ref 0–13.4)
NEUTROPHILS # BLD AUTO: 3.2 K/UL (ref 1.82–7.42)
NEUTROPHILS NFR BLD: 60.5 % (ref 44–72)
NRBC # BLD AUTO: 0 K/UL
NRBC BLD-RTO: 0 /100 WBC
PLATELET # BLD AUTO: 183 K/UL (ref 164–446)
PMV BLD AUTO: 10.2 FL (ref 9–12.9)
POTASSIUM SERPL-SCNC: 3.9 MMOL/L (ref 3.6–5.5)
PROT SERPL-MCNC: 6.6 G/DL (ref 6–8.2)
PSA SERPL-MCNC: 1.05 NG/ML (ref 0–4)
RBC # BLD AUTO: 5.64 M/UL (ref 4.7–6.1)
SODIUM SERPL-SCNC: 139 MMOL/L (ref 135–145)
TRIGL SERPL-MCNC: 125 MG/DL (ref 0–149)
WBC # BLD AUTO: 5.3 K/UL (ref 4.8–10.8)

## 2017-12-29 PROCEDURE — 84153 ASSAY OF PSA TOTAL: CPT

## 2017-12-29 PROCEDURE — 80053 COMPREHEN METABOLIC PANEL: CPT

## 2017-12-29 PROCEDURE — 36415 COLL VENOUS BLD VENIPUNCTURE: CPT

## 2017-12-29 PROCEDURE — 80061 LIPID PANEL: CPT

## 2017-12-29 PROCEDURE — 85025 COMPLETE CBC W/AUTO DIFF WBC: CPT

## 2018-01-15 ENCOUNTER — NON-PROVIDER VISIT (OUTPATIENT)
Dept: CARDIOLOGY | Facility: MEDICAL CENTER | Age: 63
End: 2018-01-15
Payer: COMMERCIAL

## 2018-01-15 VITALS
HEART RATE: 64 BPM | HEIGHT: 73 IN | SYSTOLIC BLOOD PRESSURE: 132 MMHG | OXYGEN SATURATION: 97 % | DIASTOLIC BLOOD PRESSURE: 72 MMHG | BODY MASS INDEX: 28.1 KG/M2 | WEIGHT: 212 LBS

## 2018-01-15 DIAGNOSIS — Z95.0 CARDIAC PACEMAKER IN SITU: ICD-10-CM

## 2018-01-15 DIAGNOSIS — I44.2 AV BLOCK, 3RD DEGREE (HCC): ICD-10-CM

## 2018-01-15 PROCEDURE — 93288 INTERROG EVL PM/LDLS PM IP: CPT | Performed by: NURSE PRACTITIONER

## 2018-01-15 RX ORDER — LANOLIN ALCOHOL/MO/W.PET/CERES
1000 CREAM (GRAM) TOPICAL 2 TIMES DAILY
COMMUNITY
End: 2019-05-21

## 2018-01-15 NOTE — PROGRESS NOTES
Device is working normally. 6 mode switching episodes (<1% of total time, all very short).  Normal sensing and capture of RA and RV leads; stable impedances. Battery longevity is 8.4-9.3 years.  No changes are made today.    [employer] update is done today.    FU in 6 months for next PM check with me, as well as annual follow-up with Dr. Espinal.    Collaborating MD: Vipin

## 2018-07-16 ENCOUNTER — NON-PROVIDER VISIT (OUTPATIENT)
Dept: CARDIOLOGY | Facility: MEDICAL CENTER | Age: 63
End: 2018-07-16
Payer: COMMERCIAL

## 2018-07-16 VITALS
SYSTOLIC BLOOD PRESSURE: 130 MMHG | HEIGHT: 73 IN | WEIGHT: 224 LBS | BODY MASS INDEX: 29.69 KG/M2 | DIASTOLIC BLOOD PRESSURE: 80 MMHG | HEART RATE: 62 BPM | OXYGEN SATURATION: 96 %

## 2018-07-16 DIAGNOSIS — I44.2 AV BLOCK, 3RD DEGREE (HCC): ICD-10-CM

## 2018-07-16 DIAGNOSIS — Z95.0 CARDIAC PACEMAKER IN SITU: ICD-10-CM

## 2018-07-16 PROCEDURE — 93288 INTERROG EVL PM/LDLS PM IP: CPT | Performed by: NURSE PRACTITIONER

## 2018-07-16 NOTE — PROGRESS NOTES
Device is working normally. 6 mode switching episodes, all <1 minutes (<1% of total time).  Normal sensing and capture of RA and RV leads; stable impedances. Battery longevity is 8.2-9.0 years.  No changes are made today.    FU in 6 months for next PM check with me. BP is excellent today.    Collaborating MD: Kenyatta

## 2018-09-07 ENCOUNTER — TELEPHONE (OUTPATIENT)
Dept: CARDIOLOGY | Facility: MEDICAL CENTER | Age: 63
End: 2018-09-07

## 2018-09-07 DIAGNOSIS — Z95.0 CARDIAC PACEMAKER IN SITU: ICD-10-CM

## 2018-09-07 DIAGNOSIS — I44.2 AV BLOCK, 3RD DEGREE (HCC): ICD-10-CM

## 2018-09-07 DIAGNOSIS — I10 ESSENTIAL HYPERTENSION, BENIGN: ICD-10-CM

## 2018-09-07 NOTE — TELEPHONE ENCOUNTER
Patient returning call   Received: Today   Message Contents   AKASH Antoine/Pilar     Patient is returning your call and can be reached at [phone].      Contacted patient, discussed ordered labs being mailed to home in preparation for Jan 2019 OV.  Patient verbalizes understanding.

## 2018-09-07 NOTE — TELEPHONE ENCOUNTER
Question about having lab work done for upcoming appt   Received: Today   Message Contents   AKASH Antoine/Pilar     Patient has an appt on 1/2/2019 with Dr Murray and wants to find out if he needs to have lab work done for the appt. He can be reached at [phone].      Attempted to contact patient, no answer. LVM to call back.     CMP and Lipid Panel ordered in prep for 1/2/19 FV and mailed to patient's home with explaination.

## 2018-09-10 DIAGNOSIS — I10 ESSENTIAL HYPERTENSION: ICD-10-CM

## 2018-09-10 RX ORDER — POTASSIUM CHLORIDE 750 MG/1
10 TABLET, FILM COATED, EXTENDED RELEASE ORAL 2 TIMES DAILY
Qty: 180 TAB | Refills: 2 | Status: SHIPPED | OUTPATIENT
Start: 2018-09-10 | End: 2019-07-09 | Stop reason: SDUPTHER

## 2018-10-05 ENCOUNTER — IMMUNIZATION (OUTPATIENT)
Dept: SOCIAL WORK | Facility: CLINIC | Age: 63
End: 2018-10-05
Payer: COMMERCIAL

## 2018-10-05 DIAGNOSIS — Z23 NEED FOR VACCINATION: ICD-10-CM

## 2018-10-05 PROCEDURE — 90686 IIV4 VACC NO PRSV 0.5 ML IM: CPT | Performed by: REGISTERED NURSE

## 2018-10-05 PROCEDURE — 90471 IMMUNIZATION ADMIN: CPT | Performed by: REGISTERED NURSE

## 2018-11-20 ENCOUNTER — NON-PROVIDER VISIT (OUTPATIENT)
Dept: CARDIOLOGY | Facility: MEDICAL CENTER | Age: 63
End: 2018-11-20
Payer: COMMERCIAL

## 2018-11-20 VITALS
HEIGHT: 73 IN | HEART RATE: 60 BPM | BODY MASS INDEX: 30.48 KG/M2 | DIASTOLIC BLOOD PRESSURE: 80 MMHG | OXYGEN SATURATION: 96 % | SYSTOLIC BLOOD PRESSURE: 134 MMHG | WEIGHT: 230 LBS

## 2018-11-20 DIAGNOSIS — Z95.0 CARDIAC PACEMAKER IN SITU: ICD-10-CM

## 2018-11-20 DIAGNOSIS — I44.2 AV BLOCK, 3RD DEGREE (HCC): ICD-10-CM

## 2018-11-20 PROCEDURE — 93280 PM DEVICE PROGR EVAL DUAL: CPT | Performed by: NURSE PRACTITIONER

## 2018-11-21 NOTE — PROGRESS NOTES
Device is working normally. 12 mode switching episodes, all very brief (<1% of total time).  Normal sensing and capture of RA and RV leads; stable impedances. Battery longevity is 8.7-9.5 years.  No changes are made today.    He does have FU with Dr. Murray in January 2019, and should keep this.    FU in 6 months for next PM check with me.    Collaborating MD: Soni

## 2018-12-28 ENCOUNTER — TELEPHONE (OUTPATIENT)
Dept: CARDIOLOGY | Facility: MEDICAL CENTER | Age: 63
End: 2018-12-28

## 2018-12-29 NOTE — TELEPHONE ENCOUNTER
I called the patient at [phone] (M) and left a voicemail regarding:  -To confirm his appointment with Dr. Murray for 01/02/19 @ 4689 check-in.  -To see if the patient had labs done: CMP/LIPID.  I asked the patient to return my call.

## 2018-12-31 ENCOUNTER — HOSPITAL ENCOUNTER (OUTPATIENT)
Dept: LAB | Facility: MEDICAL CENTER | Age: 63
End: 2018-12-31
Attending: INTERNAL MEDICINE
Payer: COMMERCIAL

## 2018-12-31 DIAGNOSIS — I10 ESSENTIAL HYPERTENSION, BENIGN: ICD-10-CM

## 2018-12-31 DIAGNOSIS — Z95.0 CARDIAC PACEMAKER IN SITU: ICD-10-CM

## 2018-12-31 DIAGNOSIS — I44.2 AV BLOCK, 3RD DEGREE (HCC): ICD-10-CM

## 2018-12-31 LAB
ALBUMIN SERPL BCP-MCNC: 4.4 G/DL (ref 3.2–4.9)
ALBUMIN/GLOB SERPL: 1.8 G/DL
ALP SERPL-CCNC: 84 U/L (ref 30–99)
ALT SERPL-CCNC: 30 U/L (ref 2–50)
ANION GAP SERPL CALC-SCNC: 6 MMOL/L (ref 0–11.9)
AST SERPL-CCNC: 25 U/L (ref 12–45)
BILIRUB SERPL-MCNC: 0.9 MG/DL (ref 0.1–1.5)
BUN SERPL-MCNC: 18 MG/DL (ref 8–22)
CALCIUM SERPL-MCNC: 9.9 MG/DL (ref 8.5–10.5)
CHLORIDE SERPL-SCNC: 105 MMOL/L (ref 96–112)
CHOLEST SERPL-MCNC: 185 MG/DL (ref 100–199)
CO2 SERPL-SCNC: 29 MMOL/L (ref 20–33)
CREAT SERPL-MCNC: 0.84 MG/DL (ref 0.5–1.4)
FASTING STATUS PATIENT QL REPORTED: NORMAL
GLOBULIN SER CALC-MCNC: 2.5 G/DL (ref 1.9–3.5)
GLUCOSE SERPL-MCNC: 105 MG/DL (ref 65–99)
HDLC SERPL-MCNC: 46 MG/DL
LDLC SERPL CALC-MCNC: 112 MG/DL
POTASSIUM SERPL-SCNC: 4.1 MMOL/L (ref 3.6–5.5)
PROT SERPL-MCNC: 6.9 G/DL (ref 6–8.2)
SODIUM SERPL-SCNC: 140 MMOL/L (ref 135–145)
TRIGL SERPL-MCNC: 134 MG/DL (ref 0–149)

## 2018-12-31 PROCEDURE — 80053 COMPREHEN METABOLIC PANEL: CPT

## 2018-12-31 PROCEDURE — 80061 LIPID PANEL: CPT

## 2018-12-31 PROCEDURE — 36415 COLL VENOUS BLD VENIPUNCTURE: CPT

## 2019-01-02 ENCOUNTER — OFFICE VISIT (OUTPATIENT)
Dept: CARDIOLOGY | Facility: MEDICAL CENTER | Age: 64
End: 2019-01-02
Payer: COMMERCIAL

## 2019-01-02 VITALS
RESPIRATION RATE: 14 BRPM | WEIGHT: 225.6 LBS | HEART RATE: 64 BPM | HEIGHT: 73 IN | DIASTOLIC BLOOD PRESSURE: 80 MMHG | SYSTOLIC BLOOD PRESSURE: 130 MMHG | BODY MASS INDEX: 29.9 KG/M2 | OXYGEN SATURATION: 94 %

## 2019-01-02 DIAGNOSIS — E78.5 DYSLIPIDEMIA: ICD-10-CM

## 2019-01-02 DIAGNOSIS — Z95.0 CARDIAC PACEMAKER IN SITU: ICD-10-CM

## 2019-01-02 DIAGNOSIS — Z82.49 FAMILY HISTORY OF PREMATURE CORONARY ARTERY DISEASE: ICD-10-CM

## 2019-01-02 DIAGNOSIS — Z91.89 OTHER SPECIFIED PERSONAL RISK FACTORS, NOT ELSEWHERE CLASSIFIED: ICD-10-CM

## 2019-01-02 DIAGNOSIS — I10 ESSENTIAL HYPERTENSION, BENIGN: ICD-10-CM

## 2019-01-02 PROCEDURE — 99214 OFFICE O/P EST MOD 30 MIN: CPT | Performed by: INTERNAL MEDICINE

## 2019-01-02 RX ORDER — POTASSIUM CHLORIDE 750 MG/1
TABLET, EXTENDED RELEASE ORAL
Refills: 2 | COMMUNITY
Start: 2018-12-10 | End: 2019-05-21

## 2019-01-02 ASSESSMENT — ENCOUNTER SYMPTOMS
DIZZINESS: 0
PALPITATIONS: 0
COUGH: 0
SHORTNESS OF BREATH: 0
LOSS OF CONSCIOUSNESS: 0
MYALGIAS: 0

## 2019-01-02 NOTE — PROGRESS NOTES
Chief Complaint   Patient presents with   • AV Block Complete     Here to establish care with new cardiology.        Subjective:   Sanya Miranda is a 63 y.o. male who presents today for follow-up evaluation of permanent pacemaker with a history of hypertension and family history of premature heart disease.    Last seen on 11/20/2018 for pacemaker check.    Patient had previously been followed by Dr. Anabel Dubose for hypertension and dyslipidemia.    Since 11/20/2018 has had no cardiac symptoms.  The patient's been on niacin for many years per his PCP.  The patient has significant family history of premature heart disease.    Past Medical History:   Diagnosis Date   • AV block, 3rd degree (HCC) 09/2017    Status post PM implantation   • Hypertension      Past Surgical History:   Procedure Laterality Date   • PACEMAKER INSERTION Left 09/2017    [employer] MRI 2272 implanted by Dr. Chahal.     Family History   Problem Relation Age of Onset   • Heart Disease Mother         CABG at age late 60   • Hypertension Mother    • Heart Disease Father         CABG at age 83   • Hypertension Father      Social History     Social History   • Marital status:      Spouse name: N/A   • Number of children: N/A   • Years of education: N/A     Occupational History   • Not on file.     Social History Main Topics   • Smoking status: Never Smoker   • Smokeless tobacco: Never Used   • Alcohol use 1.2 oz/week     2 Glasses of wine per week      Comment: 2-3 per week   • Drug use: No   • Sexual activity: Not on file     Other Topics Concern   • Not on file     Social History Narrative   • No narrative on file     Allergies   Allergen Reactions   • Sulfa Drugs Hives     Outpatient Encounter Prescriptions as of 1/2/2019   Medication Sig Dispense Refill   • Cetirizine HCl (ZYRTEC ALLERGY PO) Take 1 Tab by mouth as needed.     • potassium chloride ER (KLOR-CON 10) 10 MEQ tablet Take 1 Tab by mouth 2 times a day. 180 Tab 2  "  • niacin (SLO-NIACIN) 500 MG tablet Take 1,000 mg by mouth 2 times a day.     • ramipril (ALTACE) 10 MG capsule Take 10 mg by mouth 2 Times a Day.     • aspirin 81 MG tablet Take 81 mg by mouth every day.     • potassium chloride SA (K-DUR) 10 MEQ Tab CR TAKE 1 TABLET BY MOUTH TWICE A DAY  2     No facility-administered encounter medications on file as of 1/2/2019.      Review of Systems   Respiratory: Negative for cough and shortness of breath.    Cardiovascular: Negative for chest pain and palpitations.   Musculoskeletal: Negative for myalgias.   Neurological: Negative for dizziness and loss of consciousness.        Objective:   /80 (BP Location: Left arm, Patient Position: Sitting, BP Cuff Size: Adult)   Pulse 64   Resp 14   Ht 1.854 m (6' 1\")   Wt 102.3 kg (225 lb 9.6 oz)   SpO2 94%   BMI 29.76 kg/m²     Physical Exam   Constitutional: He is oriented to person, place, and time. He appears well-developed and well-nourished.   HENT:   Head: Normocephalic and atraumatic.   Eyes: Pupils are equal, round, and reactive to light. EOM are normal. No scleral icterus.   Neck: Neck supple. No JVD present. No thyromegaly present.   Cardiovascular: Normal rate, regular rhythm, normal heart sounds and intact distal pulses.  Exam reveals no gallop and no friction rub.    No murmur heard.  Pulmonary/Chest: Effort normal and breath sounds normal. No respiratory distress. He has no wheezes. He has no rales.   Pacemaker generator left subclavian area.   Abdominal: Soft. Bowel sounds are normal. He exhibits no mass. There is no tenderness.   Musculoskeletal: Normal range of motion. He exhibits no edema or deformity.   Lymphadenopathy:     He has no cervical adenopathy.   Neurological: He is alert and oriented to person, place, and time. No cranial nerve deficit. He exhibits normal muscle tone.   Skin: Skin is warm and dry.   Psychiatric: He has a normal mood and affect. His behavior is normal.     Event monitor: " 1/1/16   No significant arrhythmias pauses noted underlying rhythm was sinus.     Stress echo: 12/17/15  Negative stress echocardiogram.  No evidence of ischemia or infarct.  Normal resting left ventricular systolic function with ejection   fraction of 60%.  Fair exercise tolerance.  Resting EKG shows normal sinus rhythm with incomplete right bundle   branch block.  Non-specific ST changes with stress.  No arrhythmias noted     TTE: 12/10/15  No prior study is available for comparison.   Left ventricular ejection fraction is visually estimated to be 65%.   Normal regional wall motion. Grade I diastolic dysfunction.  No significant valve abnormalities.   Unable to estimate pulmonary artery pressure due to an inadequate   tricuspid regurgitant jet.   Ascending aorta diameter is 3.7 cm    Transthoracic echo: 3/24/17  Normal left ventricular chamber size. Mild left ventricular septal hypertrophy. Left ventricular ejection fraction is visually estimated to be 55%.  Grade I diastolic dysfunction.  Normal right ventricular size and systolic function.  Prolapse of the posterior mitral leaflet was present.  Trace tricuspid regurgitation.  Normal pericardium without effusion.  Dilated ascending aorta at 3.9 cm    EKG: 10/1/15  Sinus rhythm, right bundle branch block    Assessment:     1. Cardiac pacemaker in situ     2. Essential hypertension, benign     3. Dyslipidemia     4. Family history of premature coronary artery disease     5. Other specified personal risk factors, not elsewhere classified  CT-CARDIAC SCORING       Medical Decision Making:  Today's Assessment / Status / Plan:     Recommendation Discussion  1.  The patient is stable from a cardiovascular standpoint with normal functioning pacemaker and normal blood pressure.  2.  I reviewed his laboratory tests regarding his lipid panel.  3.  In view of his coronary risk factors of hypertension, dyslipidemia and family history of premature heart disease I would further  stratify his need for statin with a coronary calcification scan.  4.  Follow-up 1 year.

## 2019-01-15 ENCOUNTER — HOSPITAL ENCOUNTER (OUTPATIENT)
Dept: RADIOLOGY | Facility: MEDICAL CENTER | Age: 64
End: 2019-01-15
Attending: INTERNAL MEDICINE
Payer: COMMERCIAL

## 2019-01-15 DIAGNOSIS — Z91.89 OTHER SPECIFIED PERSONAL RISK FACTORS, NOT ELSEWHERE CLASSIFIED: ICD-10-CM

## 2019-01-15 PROCEDURE — 4410556 CT-CARDIAC SCORING

## 2019-01-17 ENCOUNTER — TELEPHONE (OUTPATIENT)
Dept: CARDIOLOGY | Facility: MEDICAL CENTER | Age: 64
End: 2019-01-17

## 2019-01-17 DIAGNOSIS — E78.5 DYSLIPIDEMIA: ICD-10-CM

## 2019-01-17 RX ORDER — ROSUVASTATIN CALCIUM 10 MG/1
10 TABLET, COATED ORAL EVERY EVENING
Qty: 90 TAB | Refills: 3 | Status: SHIPPED | OUTPATIENT
Start: 2019-01-17 | End: 2020-01-14

## 2019-02-14 ENCOUNTER — HOSPITAL ENCOUNTER (OUTPATIENT)
Dept: LAB | Facility: MEDICAL CENTER | Age: 64
End: 2019-02-14
Attending: INTERNAL MEDICINE
Payer: COMMERCIAL

## 2019-02-14 ENCOUNTER — TELEPHONE (OUTPATIENT)
Dept: CARDIOLOGY | Facility: MEDICAL CENTER | Age: 64
End: 2019-02-14

## 2019-02-14 DIAGNOSIS — E78.5 DYSLIPIDEMIA: ICD-10-CM

## 2019-02-14 LAB
CHOLEST SERPL-MCNC: 118 MG/DL (ref 100–199)
FASTING STATUS PATIENT QL REPORTED: NORMAL
HDLC SERPL-MCNC: 38 MG/DL
LDLC SERPL CALC-MCNC: 60 MG/DL
TRIGL SERPL-MCNC: 100 MG/DL (ref 0–149)

## 2019-02-14 PROCEDURE — 80061 LIPID PANEL: CPT

## 2019-02-14 PROCEDURE — 36415 COLL VENOUS BLD VENIPUNCTURE: CPT

## 2019-02-14 NOTE — TELEPHONE ENCOUNTER
Results for MAGO ARMAS (MRN [id number]) as of 2/14/2019 14:17   Ref. Range 2/14/2019 08:27   Cholesterol,Tot Latest Ref Range: 100 - 199 mg/dL 118   Triglycerides Latest Ref Range: 0 - 149 mg/dL 100   HDL Latest Ref Range: >=40 mg/dL 38 (A)   LDL Latest Ref Range: <100 mg/dL 60     Since starting Crestor patient's lipid panel has improved.  Contacted patient and discussed results.  Advised to continue taking medication. Patient verbalizes understanding.

## 2019-05-21 ENCOUNTER — NON-PROVIDER VISIT (OUTPATIENT)
Dept: CARDIOLOGY | Facility: MEDICAL CENTER | Age: 64
End: 2019-05-21
Payer: COMMERCIAL

## 2019-05-21 VITALS
SYSTOLIC BLOOD PRESSURE: 132 MMHG | HEIGHT: 73 IN | OXYGEN SATURATION: 95 % | HEART RATE: 90 BPM | BODY MASS INDEX: 29.82 KG/M2 | DIASTOLIC BLOOD PRESSURE: 72 MMHG | WEIGHT: 225 LBS

## 2019-05-21 DIAGNOSIS — I44.2 AV BLOCK, 3RD DEGREE (HCC): ICD-10-CM

## 2019-05-21 DIAGNOSIS — Z95.0 CARDIAC PACEMAKER IN SITU: ICD-10-CM

## 2019-05-21 PROCEDURE — 93280 PM DEVICE PROGR EVAL DUAL: CPT | Performed by: NURSE PRACTITIONER

## 2019-05-21 NOTE — PROGRESS NOTES
Device is working normally. 6 brief mode switching episodes.  Normal sensing and capture of RA and RV leads; stable impedances. Battery longevity is 8.6-9.5 years.  No changes are made today.    FU in 6 months for next PM check with me.    He will be due to see Dr. Murray in January 2020.    Collaborating MD: Erasmo

## 2019-05-22 ENCOUNTER — APPOINTMENT (OUTPATIENT)
Dept: RADIOLOGY | Facility: MEDICAL CENTER | Age: 64
End: 2019-05-22
Attending: EMERGENCY MEDICINE
Payer: COMMERCIAL

## 2019-05-22 ENCOUNTER — HOSPITAL ENCOUNTER (OUTPATIENT)
Facility: MEDICAL CENTER | Age: 64
End: 2019-05-23
Attending: EMERGENCY MEDICINE | Admitting: HOSPITALIST
Payer: COMMERCIAL

## 2019-05-22 DIAGNOSIS — R94.31 ABNORMAL EKG: ICD-10-CM

## 2019-05-22 DIAGNOSIS — R07.9 ACUTE CHEST PAIN: ICD-10-CM

## 2019-05-22 PROBLEM — R07.89 ATYPICAL CHEST PAIN: Status: ACTIVE | Noted: 2019-05-22

## 2019-05-22 PROBLEM — R73.09 ELEVATED GLUCOSE: Status: ACTIVE | Noted: 2019-05-22

## 2019-05-22 LAB
ALBUMIN SERPL BCP-MCNC: 4.4 G/DL (ref 3.2–4.9)
ALBUMIN/GLOB SERPL: 1.6 G/DL
ALP SERPL-CCNC: 98 U/L (ref 30–99)
ALT SERPL-CCNC: 35 U/L (ref 2–50)
ANION GAP SERPL CALC-SCNC: 10 MMOL/L (ref 0–11.9)
AST SERPL-CCNC: 29 U/L (ref 12–45)
BASOPHILS # BLD AUTO: 0.5 % (ref 0–1.8)
BASOPHILS # BLD: 0.04 K/UL (ref 0–0.12)
BILIRUB SERPL-MCNC: 0.6 MG/DL (ref 0.1–1.5)
BUN SERPL-MCNC: 22 MG/DL (ref 8–22)
CALCIUM SERPL-MCNC: 9.3 MG/DL (ref 8.4–10.2)
CHLORIDE SERPL-SCNC: 100 MMOL/L (ref 96–112)
CO2 SERPL-SCNC: 27 MMOL/L (ref 20–33)
CREAT SERPL-MCNC: 1.04 MG/DL (ref 0.5–1.4)
EOSINOPHIL # BLD AUTO: 0.16 K/UL (ref 0–0.51)
EOSINOPHIL NFR BLD: 2 % (ref 0–6.9)
ERYTHROCYTE [DISTWIDTH] IN BLOOD BY AUTOMATED COUNT: 38.1 FL (ref 35.9–50)
GLOBULIN SER CALC-MCNC: 2.7 G/DL (ref 1.9–3.5)
GLUCOSE SERPL-MCNC: 119 MG/DL (ref 65–99)
HCT VFR BLD AUTO: 48.6 % (ref 42–52)
HGB BLD-MCNC: 17.2 G/DL (ref 14–18)
IMM GRANULOCYTES # BLD AUTO: 0.02 K/UL (ref 0–0.11)
IMM GRANULOCYTES NFR BLD AUTO: 0.2 % (ref 0–0.9)
LIPASE SERPL-CCNC: 32 U/L (ref 7–58)
LYMPHOCYTES # BLD AUTO: 2.4 K/UL (ref 1–4.8)
LYMPHOCYTES NFR BLD: 29.6 % (ref 22–41)
MCH RBC QN AUTO: 29.6 PG (ref 27–33)
MCHC RBC AUTO-ENTMCNC: 35.4 G/DL (ref 33.7–35.3)
MCV RBC AUTO: 83.5 FL (ref 81.4–97.8)
MONOCYTES # BLD AUTO: 0.71 K/UL (ref 0–0.85)
MONOCYTES NFR BLD AUTO: 8.7 % (ref 0–13.4)
NEUTROPHILS # BLD AUTO: 4.79 K/UL (ref 1.82–7.42)
NEUTROPHILS NFR BLD: 59 % (ref 44–72)
NRBC # BLD AUTO: 0 K/UL
NRBC BLD-RTO: 0 /100 WBC
PLATELET # BLD AUTO: 235 K/UL (ref 164–446)
PMV BLD AUTO: 9.7 FL (ref 9–12.9)
POTASSIUM SERPL-SCNC: 3.7 MMOL/L (ref 3.6–5.5)
PROT SERPL-MCNC: 7.1 G/DL (ref 6–8.2)
RBC # BLD AUTO: 5.82 M/UL (ref 4.7–6.1)
SODIUM SERPL-SCNC: 137 MMOL/L (ref 135–145)
TROPONIN I SERPL-MCNC: 0.03 NG/ML (ref 0–0.04)
WBC # BLD AUTO: 8.1 K/UL (ref 4.8–10.8)

## 2019-05-22 PROCEDURE — G0378 HOSPITAL OBSERVATION PER HR: HCPCS

## 2019-05-22 PROCEDURE — 99285 EMERGENCY DEPT VISIT HI MDM: CPT

## 2019-05-22 PROCEDURE — 71046 X-RAY EXAM CHEST 2 VIEWS: CPT

## 2019-05-22 PROCEDURE — 99219 PR INITIAL OBSERVATION CARE,LEVL II: CPT | Performed by: HOSPITALIST

## 2019-05-22 PROCEDURE — 700102 HCHG RX REV CODE 250 W/ 637 OVERRIDE(OP): Performed by: EMERGENCY MEDICINE

## 2019-05-22 PROCEDURE — 85025 COMPLETE CBC W/AUTO DIFF WBC: CPT

## 2019-05-22 PROCEDURE — 83036 HEMOGLOBIN GLYCOSYLATED A1C: CPT

## 2019-05-22 PROCEDURE — 83690 ASSAY OF LIPASE: CPT

## 2019-05-22 PROCEDURE — A9270 NON-COVERED ITEM OR SERVICE: HCPCS | Performed by: EMERGENCY MEDICINE

## 2019-05-22 PROCEDURE — 84484 ASSAY OF TROPONIN QUANT: CPT

## 2019-05-22 PROCEDURE — 80053 COMPREHEN METABOLIC PANEL: CPT

## 2019-05-22 PROCEDURE — 93005 ELECTROCARDIOGRAM TRACING: CPT | Performed by: EMERGENCY MEDICINE

## 2019-05-22 RX ORDER — ASPIRIN 81 MG/1
324 TABLET, CHEWABLE ORAL DAILY
Status: DISCONTINUED | OUTPATIENT
Start: 2019-05-23 | End: 2019-05-23 | Stop reason: HOSPADM

## 2019-05-22 RX ORDER — BISACODYL 10 MG
10 SUPPOSITORY, RECTAL RECTAL
Status: DISCONTINUED | OUTPATIENT
Start: 2019-05-22 | End: 2019-05-23 | Stop reason: HOSPADM

## 2019-05-22 RX ORDER — NITROGLYCERIN 0.4 MG/1
0.4 TABLET SUBLINGUAL
Status: DISCONTINUED | OUTPATIENT
Start: 2019-05-22 | End: 2019-05-23 | Stop reason: HOSPADM

## 2019-05-22 RX ORDER — ASPIRIN 325 MG
325 TABLET ORAL DAILY
Status: DISCONTINUED | OUTPATIENT
Start: 2019-05-23 | End: 2019-05-23 | Stop reason: HOSPADM

## 2019-05-22 RX ORDER — ASPIRIN 81 MG/1
324 TABLET, CHEWABLE ORAL ONCE
Status: COMPLETED | OUTPATIENT
Start: 2019-05-22 | End: 2019-05-22

## 2019-05-22 RX ORDER — POLYETHYLENE GLYCOL 3350 17 G/17G
1 POWDER, FOR SOLUTION ORAL
Status: DISCONTINUED | OUTPATIENT
Start: 2019-05-22 | End: 2019-05-23 | Stop reason: HOSPADM

## 2019-05-22 RX ORDER — ONDANSETRON 2 MG/ML
4 INJECTION INTRAMUSCULAR; INTRAVENOUS EVERY 4 HOURS PRN
Status: DISCONTINUED | OUTPATIENT
Start: 2019-05-22 | End: 2019-05-23 | Stop reason: HOSPADM

## 2019-05-22 RX ORDER — ASPIRIN 600 MG/1
300 SUPPOSITORY RECTAL DAILY
Status: DISCONTINUED | OUTPATIENT
Start: 2019-05-23 | End: 2019-05-23 | Stop reason: HOSPADM

## 2019-05-22 RX ORDER — ROSUVASTATIN CALCIUM 10 MG/1
10 TABLET, COATED ORAL EVERY EVENING
Status: DISCONTINUED | OUTPATIENT
Start: 2019-05-22 | End: 2019-05-23 | Stop reason: HOSPADM

## 2019-05-22 RX ORDER — AMOXICILLIN 250 MG
2 CAPSULE ORAL 2 TIMES DAILY
Status: DISCONTINUED | OUTPATIENT
Start: 2019-05-22 | End: 2019-05-23 | Stop reason: HOSPADM

## 2019-05-22 RX ORDER — RAMIPRIL 2.5 MG/1
10 CAPSULE ORAL 2 TIMES DAILY
Status: DISCONTINUED | OUTPATIENT
Start: 2019-05-22 | End: 2019-05-23 | Stop reason: HOSPADM

## 2019-05-22 RX ORDER — ACETAMINOPHEN 325 MG/1
650 TABLET ORAL EVERY 6 HOURS PRN
Status: DISCONTINUED | OUTPATIENT
Start: 2019-05-22 | End: 2019-05-23 | Stop reason: HOSPADM

## 2019-05-22 RX ORDER — MORPHINE SULFATE 4 MG/ML
2 INJECTION, SOLUTION INTRAMUSCULAR; INTRAVENOUS
Status: DISCONTINUED | OUTPATIENT
Start: 2019-05-22 | End: 2019-05-23 | Stop reason: HOSPADM

## 2019-05-22 RX ADMIN — ASPIRIN 81 MG 324 MG: 81 TABLET ORAL at 21:09

## 2019-05-22 ASSESSMENT — LIFESTYLE VARIABLES
TOTAL SCORE: 0
TOTAL SCORE: 0
AVERAGE NUMBER OF DAYS PER WEEK YOU HAVE A DRINK CONTAINING ALCOHOL: 3
CONSUMPTION TOTAL: NEGATIVE
HAVE YOU EVER FELT YOU SHOULD CUT DOWN ON YOUR DRINKING: NO
ON A TYPICAL DAY WHEN YOU DRINK ALCOHOL HOW MANY DRINKS DO YOU HAVE: 2
EVER HAD A DRINK FIRST THING IN THE MORNING TO STEADY YOUR NERVES TO GET RID OF A HANGOVER: NO
ALCOHOL_USE: YES
TOTAL SCORE: 0
HAVE PEOPLE ANNOYED YOU BY CRITICIZING YOUR DRINKING: NO
HOW MANY TIMES IN THE PAST YEAR HAVE YOU HAD 5 OR MORE DRINKS IN A DAY: 0
EVER_SMOKED: NEVER
EVER FELT BAD OR GUILTY ABOUT YOUR DRINKING: NO

## 2019-05-22 ASSESSMENT — ENCOUNTER SYMPTOMS
INSOMNIA: 0
SHORTNESS OF BREATH: 1
HEADACHES: 0
COUGH: 0
NECK PAIN: 0
NAUSEA: 0
BRUISES/BLEEDS EASILY: 0
DOUBLE VISION: 0
PALPITATIONS: 0
WEAKNESS: 0
SORE THROAT: 0
VOMITING: 0
BLURRED VISION: 0
DIZZINESS: 0
FEVER: 0
MYALGIAS: 0
DEPRESSION: 0

## 2019-05-22 ASSESSMENT — COGNITIVE AND FUNCTIONAL STATUS - GENERAL
SUGGESTED CMS G CODE MODIFIER MOBILITY: CH
DAILY ACTIVITIY SCORE: 24
MOBILITY SCORE: 24
SUGGESTED CMS G CODE MODIFIER DAILY ACTIVITY: CH

## 2019-05-22 ASSESSMENT — PATIENT HEALTH QUESTIONNAIRE - PHQ9
SUM OF ALL RESPONSES TO PHQ9 QUESTIONS 1 AND 2: 0
1. LITTLE INTEREST OR PLEASURE IN DOING THINGS: NOT AT ALL
2. FEELING DOWN, DEPRESSED, IRRITABLE, OR HOPELESS: NOT AT ALL

## 2019-05-23 ENCOUNTER — APPOINTMENT (OUTPATIENT)
Dept: RADIOLOGY | Facility: MEDICAL CENTER | Age: 64
End: 2019-05-23
Attending: HOSPITALIST
Payer: COMMERCIAL

## 2019-05-23 ENCOUNTER — PATIENT OUTREACH (OUTPATIENT)
Dept: HEALTH INFORMATION MANAGEMENT | Facility: OTHER | Age: 64
End: 2019-05-23

## 2019-05-23 VITALS
BODY MASS INDEX: 30.27 KG/M2 | DIASTOLIC BLOOD PRESSURE: 71 MMHG | HEART RATE: 61 BPM | RESPIRATION RATE: 13 BRPM | TEMPERATURE: 97.9 F | SYSTOLIC BLOOD PRESSURE: 136 MMHG | WEIGHT: 228.4 LBS | HEIGHT: 73 IN | OXYGEN SATURATION: 91 %

## 2019-05-23 LAB
ANION GAP SERPL CALC-SCNC: 7 MMOL/L (ref 0–11.9)
BASOPHILS # BLD AUTO: 0.6 % (ref 0–1.8)
BASOPHILS # BLD: 0.04 K/UL (ref 0–0.12)
BUN SERPL-MCNC: 22 MG/DL (ref 8–22)
CALCIUM SERPL-MCNC: 9.2 MG/DL (ref 8.4–10.2)
CHLORIDE SERPL-SCNC: 104 MMOL/L (ref 96–112)
CO2 SERPL-SCNC: 26 MMOL/L (ref 20–33)
CREAT SERPL-MCNC: 0.96 MG/DL (ref 0.5–1.4)
EKG IMPRESSION: NORMAL
EKG IMPRESSION: NORMAL
EOSINOPHIL # BLD AUTO: 0.07 K/UL (ref 0–0.51)
EOSINOPHIL NFR BLD: 1.1 % (ref 0–6.9)
ERYTHROCYTE [DISTWIDTH] IN BLOOD BY AUTOMATED COUNT: 38.6 FL (ref 35.9–50)
GLUCOSE SERPL-MCNC: 114 MG/DL (ref 65–99)
HCT VFR BLD AUTO: 47.2 % (ref 42–52)
HGB BLD-MCNC: 16.4 G/DL (ref 14–18)
IMM GRANULOCYTES # BLD AUTO: 0.01 K/UL (ref 0–0.11)
IMM GRANULOCYTES NFR BLD AUTO: 0.2 % (ref 0–0.9)
LYMPHOCYTES # BLD AUTO: 1.44 K/UL (ref 1–4.8)
LYMPHOCYTES NFR BLD: 21.7 % (ref 22–41)
MCH RBC QN AUTO: 29.7 PG (ref 27–33)
MCHC RBC AUTO-ENTMCNC: 34.7 G/DL (ref 33.7–35.3)
MCV RBC AUTO: 85.4 FL (ref 81.4–97.8)
MONOCYTES # BLD AUTO: 0.52 K/UL (ref 0–0.85)
MONOCYTES NFR BLD AUTO: 7.8 % (ref 0–13.4)
NEUTROPHILS # BLD AUTO: 4.56 K/UL (ref 1.82–7.42)
NEUTROPHILS NFR BLD: 68.6 % (ref 44–72)
NRBC # BLD AUTO: 0 K/UL
NRBC BLD-RTO: 0 /100 WBC
PLATELET # BLD AUTO: 232 K/UL (ref 164–446)
PMV BLD AUTO: 10.4 FL (ref 9–12.9)
POTASSIUM SERPL-SCNC: 4.1 MMOL/L (ref 3.6–5.5)
RBC # BLD AUTO: 5.53 M/UL (ref 4.7–6.1)
SODIUM SERPL-SCNC: 137 MMOL/L (ref 135–145)
TROPONIN I SERPL-MCNC: 0.02 NG/ML (ref 0–0.04)
TROPONIN I SERPL-MCNC: 0.03 NG/ML (ref 0–0.04)
WBC # BLD AUTO: 6.6 K/UL (ref 4.8–10.8)

## 2019-05-23 PROCEDURE — 93018 CV STRESS TEST I&R ONLY: CPT | Performed by: INTERNAL MEDICINE

## 2019-05-23 PROCEDURE — 99217 PR OBSERVATION CARE DISCHARGE: CPT | Performed by: INTERNAL MEDICINE

## 2019-05-23 PROCEDURE — 78452 HT MUSCLE IMAGE SPECT MULT: CPT | Mod: 26 | Performed by: INTERNAL MEDICINE

## 2019-05-23 PROCEDURE — 85025 COMPLETE CBC W/AUTO DIFF WBC: CPT

## 2019-05-23 PROCEDURE — 84484 ASSAY OF TROPONIN QUANT: CPT

## 2019-05-23 PROCEDURE — A9270 NON-COVERED ITEM OR SERVICE: HCPCS | Performed by: HOSPITALIST

## 2019-05-23 PROCEDURE — 700102 HCHG RX REV CODE 250 W/ 637 OVERRIDE(OP): Performed by: HOSPITALIST

## 2019-05-23 PROCEDURE — A9502 TC99M TETROFOSMIN: HCPCS

## 2019-05-23 PROCEDURE — 80048 BASIC METABOLIC PNL TOTAL CA: CPT

## 2019-05-23 PROCEDURE — 93005 ELECTROCARDIOGRAM TRACING: CPT | Performed by: HOSPITALIST

## 2019-05-23 PROCEDURE — 93010 ELECTROCARDIOGRAM REPORT: CPT | Performed by: INTERNAL MEDICINE

## 2019-05-23 PROCEDURE — G0378 HOSPITAL OBSERVATION PER HR: HCPCS

## 2019-05-23 RX ADMIN — ROSUVASTATIN CALCIUM 10 MG: 10 TABLET, FILM COATED ORAL at 00:00

## 2019-05-23 RX ADMIN — ASPIRIN 325 MG ORAL TABLET 325 MG: 325 PILL ORAL at 06:01

## 2019-05-23 RX ADMIN — NITROGLYCERIN 0.5 INCH: 20 OINTMENT TOPICAL at 01:04

## 2019-05-23 RX ADMIN — RAMIPRIL 10 MG: 2.5 CAPSULE ORAL at 06:01

## 2019-05-23 RX ADMIN — RAMIPRIL 10 MG: 2.5 CAPSULE ORAL at 00:00

## 2019-05-23 NOTE — PROGRESS NOTES
Patient presents to NM suite for treadmill cardiac stress test with MPI. Nursing goals identified: knowledge deficit, potential for anxiety r/t stress test, potential for compromised cardiac output. Care plan includes educating patient, reassurance and access to ACLS cart/team. Labs and ECG reviewed. No caffeine and NPO confirmed. Resting images attained and patient prepped for treadmill stress study. Max , Max HR achieved 97%, Target HR achieved 114%, METs achieved 10.1. Patient reported these symptoms: shortness of breath, leg heaviness, denied CP, did convert out of paced rhythm towards end of STG 2. Caffeinated beverage provided. Symptoms resolved.

## 2019-05-23 NOTE — ED TRIAGE NOTES
"Chief Complaint   Patient presents with   • Chest Pain     started about 30 minutes ago, mid chest pain w/o radiation, some SOB, and tingling in hands, denies any nausea. Pt does have a pacemaker.      BP (!) 173/98   Pulse 85   Temp 36.9 °C (98.4 °F) (Temporal)   Resp 18   Ht 1.854 m (6' 1\")   SpO2 97%   BMI 29.69 kg/m²     Pt BIB wife for above concern. EKG done in triage. Pt returned to ED lobby and instructed to alert triage RN of any further concerns.  "

## 2019-05-23 NOTE — PROGRESS NOTES
Gave bedside report to NELA Jacobs. Plan of care discussed. Safety precautions in place. Personal belongings and call light are with in reach. Patient has no additional needs at this time.

## 2019-05-23 NOTE — PROGRESS NOTES
Telemetry Shift Summary    Rhythm AV paced  HR Range 60's-70's  Ectopy occ to freq PVC, occ couplets  Measurements paced rhythm        Normal Values  Rhythm SR  HR Range    Measurements 0.12-0.20 / 0.06-0.10  / 0.30-0.52

## 2019-05-23 NOTE — PROGRESS NOTES
Bedside report received from Artemio RONDON. Assumed care. POC discussed. Pt ambulating around room. Safety precautions in place.

## 2019-05-23 NOTE — CARE PLAN
Problem: Communication  Goal: The ability to communicate needs accurately and effectively will improve  Outcome: PROGRESSING AS EXPECTED  POC discussed including stress test, troponins, telemetry monitor, d/c planning. Questions addressed. Pt oriented to unit routine and environment including MD rounding and call light system.    Problem: Venous Thromboembolism (VTW)/Deep Vein Thrombosis (DVT) Prevention:  Goal: Patient will participate in Venous Thrombosis (VTE)/Deep Vein Thrombosis (DVT)Prevention Measures  Outcome: PROGRESSING AS EXPECTED  Pt educated on VTE, including use of SCD's and lovenox. Pt ambulating hallways frequently, will reinforce use of SCD's when pt is at rest. Will reinforce use of Lovenox, especially if pt becomes less mobile. Pt verbalizes understanding.

## 2019-05-23 NOTE — ED PROVIDER NOTES
"ED Provider Note    CHIEF COMPLAINT  Chief Complaint   Patient presents with   • Chest Pain     started about 30 minutes ago, mid chest pain w/o radiation, some SOB, and tingling in hands, denies any nausea. Pt does have a pacemaker.         HPI    Primary care provider: Aurelio ASHFORD M.D.   History obtained from: Patient and wife  History limited by: None     Sanya Miranda is a 64 y.o. male who presents to the ED with wife complaining of sudden onset of feeling hot in the forehead with prickly tingling sensation radiating up his arms and into his chest while he was watching a movie around 45 minutes prior to arrival.  He felt slight shortness of breath.  No syncope or nausea.  He reports feeling slightly better now.  He reports feeling fine earlier today without recent illness.  He denies fever/cough.  Patient with history of pacemaker due to \"my upper and lower heart were not talking to each other and my heart rate got down to 30-40.\"    REVIEW OF SYSTEMS  Please see HPI for pertinent positives/negatives.  All other systems reviewed and are negative.     PAST MEDICAL HISTORY  Past Medical History:   Diagnosis Date   • AV block, 3rd degree (HCC) 09/2017    Status post PM implantation   • Hyperlipidemia    • Hypertension         SURGICAL HISTORY  Past Surgical History:   Procedure Laterality Date   • PACEMAKER INSERTION Left 09/2017    [employer] MRI 2272 implanted by Dr. Chahal.        SOCIAL HISTORY  Social History     Social History Main Topics   • Smoking status: Never Smoker   • Smokeless tobacco: Never Used   • Alcohol use 1.2 oz/week     2 Glasses of wine per week      Comment: 2-3 per week   • Drug use: No   • Sexual activity: Not on file        FAMILY HISTORY  Family History   Problem Relation Age of Onset   • Heart Disease Mother         CABG at age late 60   • Hypertension Mother    • Heart Disease Father         CABG at age 83   • Hypertension Father         CURRENT MEDICATIONS  Home " "Medications     Reviewed by Lakesha Singleton R.N. (Registered Nurse) on 05/22/19 at 2032  Med List Status: Partial   Medication Last Dose Status   aspirin 81 MG tablet 5/22/2019 Active   Cetirizine HCl (ZYRTEC ALLERGY PO)  Active   potassium chloride ER (KLOR-CON 10) 10 MEQ tablet 5/22/2019 Active   ramipril (ALTACE) 10 MG capsule 5/22/2019 Active   rosuvastatin (CRESTOR) 10 MG Tab 5/21/2019 Active                 ALLERGIES  Allergies   Allergen Reactions   • Sulfa Drugs Hives        PHYSICAL EXAM  VITAL SIGNS: BP (!) 173/98   Pulse 64   Temp 36.9 °C (98.4 °F) (Temporal)   Resp 20   Ht 1.854 m (6' 1\")   SpO2 92%   BMI 29.69 kg/m²  @MANSI[[id number]::@     Pulse ox interpretation: 97% I interpret this pulse ox as normal     Cardiac monitor interpretation: Sinus rhythm with heart rate in the 70s as interpreted by me.  The patient presented with chest discomfort and cardiac monitor was ordered to monitor for dysrhythmia.    Constitutional: Well developed, well nourished, alert in no apparent distress, nontoxic appearance    HENT: No external signs of trauma, normocephalic, oropharynx moist and clear, nose normal    Eyes: PERRL, conjunctiva without erythema, no discharge, no icterus    Neck: Soft and supple, trachea midline, no stridor, no tenderness, no LAD, no JVD, good ROM    Cardiovascular: Regular rate and rhythm, no murmurs/rubs/gallops, strong distal pulses and good perfusion    Thorax & Lungs: No respiratory distress, CTAB   Abdomen: Soft, nontender, nondistended, no guarding, no rebound, normal BS    Back: No CVAT    Extremities: No cyanosis, no edema, no gross deformity, good ROM, no tenderness, intact distal pulses with brisk cap refill    Skin: Warm, dry, no pallor/cyanosis, no rash noted    Lymphatic: No lymphadenopathy noted    Neuro: A/O times 3, no focal deficits noted    Psychiatric: Cooperative, normal mood and affect, normal judgement, appropriate for clinical situation        DIAGNOSTIC STUDIES / " PROCEDURES    EKG  12 Lead EKG obtained at 2025 and interpreted by me:   Rate: 74   Rhythm: Sinus rhythm   Ectopy: None  Intervals: First-degree block  Axis: RAD  QRS: IVCD  ST segments: ST depression inferior and lateral leads  T Waves: T wave inversion in inferior and lateral leads    Clinical Impression: Sinus rhythm with first-degree block and IVCD and ST depression with T wave inversion in inferior and lateral leads  Compared to September 7, 2017.  T wave inversion and ST depression in lateral leads are new on today's EKG.      LABS  All labs reviewed by me.     Results for orders placed or performed during the hospital encounter of 05/22/19   CBC WITH DIFFERENTIAL   Result Value Ref Range    WBC 8.1 4.8 - 10.8 K/uL    RBC 5.82 4.70 - 6.10 M/uL    Hemoglobin 17.2 14.0 - 18.0 g/dL    Hematocrit 48.6 42.0 - 52.0 %    MCV 83.5 81.4 - 97.8 fL    MCH 29.6 27.0 - 33.0 pg    MCHC 35.4 (H) 33.7 - 35.3 g/dL    RDW 38.1 35.9 - 50.0 fL    Platelet Count 235 164 - 446 K/uL    MPV 9.7 9.0 - 12.9 fL    Neutrophils-Polys 59.00 44.00 - 72.00 %    Lymphocytes 29.60 22.00 - 41.00 %    Monocytes 8.70 0.00 - 13.40 %    Eosinophils 2.00 0.00 - 6.90 %    Basophils 0.50 0.00 - 1.80 %    Immature Granulocytes 0.20 0.00 - 0.90 %    Nucleated RBC 0.00 /100 WBC    Neutrophils (Absolute) 4.79 1.82 - 7.42 K/uL    Lymphs (Absolute) 2.40 1.00 - 4.80 K/uL    Monos (Absolute) 0.71 0.00 - 0.85 K/uL    Eos (Absolute) 0.16 0.00 - 0.51 K/uL    Baso (Absolute) 0.04 0.00 - 0.12 K/uL    Immature Granulocytes (abs) 0.02 0.00 - 0.11 K/uL    NRBC (Absolute) 0.00 K/uL   COMP METABOLIC PANEL   Result Value Ref Range    Sodium 137 135 - 145 mmol/L    Potassium 3.7 3.6 - 5.5 mmol/L    Chloride 100 96 - 112 mmol/L    Co2 27 20 - 33 mmol/L    Anion Gap 10.0 0.0 - 11.9    Glucose 119 (H) 65 - 99 mg/dL    Bun 22 8 - 22 mg/dL    Creatinine 1.04 0.50 - 1.40 mg/dL    Calcium 9.3 8.4 - 10.2 mg/dL    AST(SGOT) 29 12 - 45 U/L    ALT(SGPT) 35 2 - 50 U/L    Alkaline  Phosphatase 98 30 - 99 U/L    Total Bilirubin 0.6 0.1 - 1.5 mg/dL    Albumin 4.4 3.2 - 4.9 g/dL    Total Protein 7.1 6.0 - 8.2 g/dL    Globulin 2.7 1.9 - 3.5 g/dL    A-G Ratio 1.6 g/dL   LIPASE   Result Value Ref Range    Lipase 32 7 - 58 U/L   TROPONIN   Result Value Ref Range    Troponin I 0.03 0.00 - 0.04 ng/mL   ESTIMATED GFR   Result Value Ref Range    GFR If African American >60 >60 mL/min/1.73 m 2    GFR If Non African American >60 >60 mL/min/1.73 m 2   EKG (NOW)   Result Value Ref Range    Report       [facility] Emergency Dept.    Test Date:  2019  Pt Name:    MAGO ARMAS              Department: [facility]  MRN:        [id number]                      [facility]  Gender:     Male                         Technician:   :        1955                   Requested By:MASOOD CHO  Order #:    [id number]                    Reading MD:    Measurements  Intervals                                Axis  Rate:       74                           P:          62  AL:         212                          QRS:        110  QRSD:       160                          T:          -62  QT:         428  QTc:        475    Interpretive Statements  SINUS RHYTHM  BORDERLINE AV CONDUCTION DELAY  PROBABLE LEFT ATRIAL ABNORMALITY  NONSPECIFIC INTRAVENTRICULAR CONDUCTION DELAY  ST DEPRESSION, CONSIDER ISCHEMIA, DIFFUSE LDS  Compared to ECG 2017 03:53:38  Intraventricular conduction delay now present  ST (T wave) deviation now present  Possible ischemia now present   First degree AV block no longer present  Left posterior fascicular block no longer present  Right bundle-branch block no longer present          RADIOLOGY  The radiologist's interpretation of all radiological studies have been reviewed by me.     DX-CHEST-2 VIEWS   Final Result      No acute cardiopulmonary abnormality.      NM-CARDIAC STRESS TEST    (Results Pending)          COURSE & MEDICAL DECISION MAKING  Nursing notes,  VS, PMSFHx reviewed in chart.     Review of past medical records shows the patient was last admitted at [facility] September 6, 2017 for complete heart block and had pacemaker placement.  He was discharged on September 7, 2017.  He has since had regular outpatient visits with the heart [place].      Differential diagnoses considered include but are not limited to: AMI, pericardial effusion/tamponade, pericarditis, pleurisy, costochondritis, esophageal spasm, GERD, gastritis, PUD, hiatal hernia, muscle strain, neuropathy       Pt risk-stratified as intermediate risk for MACE in the next 6 weeks by HEART Score: 4    HISTORY  Highly suspicious +2  Moderately suspicious +1  Slightly suspicious 0    EKG  Significant ST depression +2  Nonspecific repolarization disturbance +1  Normal 0    AGE  ? 65 +2  45-65 +1  < 45 0    RISK FACTORS  Hypercholesterolemia, HTN, DM, Cigarette smoking, positive family history, obesity  ? 3 risk factors or history of atherosclerotic disease +2  1-2 risk factors +1  No risk factors known 0    TROPONIN  ? 3× normal limit +2  1-3× normal limit +1  ? normal limit 0      2215: D/W Dr. Shannon, hospitalist, who will admit patient      History and physical exam as above.  Patient's EKG showed T wave inversion and ST depression in lateral leads that were not present on previous EKG.  Chest x-ray without evidence of acute abnormality.  Labs were fairly unremarkable.  I discussed the findings with the patient and his wife.  This is a pleasant well-appearing patient in no acute distress and nontoxic in appearance and reports feeling better.  He is agreeable to admission.  Discussed with Dr. Shannon who graciously agreed to admit patient for further care.      FINAL IMPRESSION  1. Acute chest pain Acute   2. Abnormal EKG Active          DISPOSITION  Patient will be admitted by hospitalist for further care      Electronically signed by: Bladimir Gupta, 5/22/2019 8:42 PM      Portions of this record were  made with voice recognition software.  Despite my review, spelling/grammar/context errors may still remain.  Interpretation of this chart should be taken in this context.

## 2019-05-23 NOTE — PROGRESS NOTES
Assessment completed. Pt AAOx4, no complaints of pain, n/v, SOB, numbness/tingling. Pt ambulating hallways this AM, awaiting stress test. Per nuc med schedule, pt to go around 1000. Pt states no additional needs at this time.

## 2019-05-23 NOTE — ASSESSMENT & PLAN NOTE
-Uncontrolled.  Initial blood pressure is 166/84.  -We will start Nitropaste right now.  -Patient takes ramipril for hypertension at home that I will continue.

## 2019-05-23 NOTE — ASSESSMENT & PLAN NOTE
-Troponin on admission: Negative  -EKG findings: Abnormal EKGs with T wave inversions and ST depression  -Serial cardiac enzymes every 4 hours ×3  -MONAB  -NPO for Stress test in am  -Cardiologist: Dr. Kpaoor

## 2019-05-23 NOTE — CARE PLAN
Problem: Safety  Goal: Will remain free from falls  Outcome: PROGRESSING AS EXPECTED  Remind patient to use call light and provide assistance. Bed in low position, bed locked, and appropriate alarms set. Patient wearing non-slip socks. Call light and personal belongings are within reach.    Problem: Knowledge Deficit  Goal: Knowledge of disease process/condition, treatment plan, diagnostic tests, and medications will improve  Outcome: PROGRESSING AS EXPECTED  Encourage patient and family to ask questions and be involved in plan of care. Provide education on treatment plan, diagnostic testing, and medications; have patient and family verbalize understanding.

## 2019-05-23 NOTE — DISCHARGE INSTRUCTIONS
Discharge Instructions    Discharged to home by car with relative. Discharged via walking, hospital escort: Refused.  Special equipment needed: Not Applicable    Be sure to schedule a follow-up appointment with your primary care doctor or any specialists as instructed.     Discharge Plan:   Diet Plan: Discussed  Activity Level: Discussed  Confirmed Follow up Appointment: Patient to Call and Schedule Appointment  Confirmed Symptoms Management: Discussed  Medication Reconciliation Updated: Yes  Influenza Vaccine Indication: Not indicated: Previously immunized this influenza season and > 8 years of age    I understand that a diet low in cholesterol, fat, and sodium is recommended for good health. Unless I have been given specific instructions below for another diet, I accept this instruction as my diet prescription.   Other diet: Regular    Special Instructions: None    · Is patient discharged on Warfarin / Coumadin?   No     Chest Pain, Nonspecific  It is often hard to give a specific diagnosis for the cause of chest pain. There is always a chance that your pain could be related to something serious, like a heart attack or a blood clot in the lungs. You need to follow up with your caregiver for further evaluation. More lab tests or other studies such as X-rays, electrocardiography, stress testing, or cardiac imaging may be needed to find the cause of your pain.  Most of the time, nonspecific chest pain improves within 2 to 3 days with rest and mild pain medicine. For the next few days, avoid physical exertion or activities that bring on pain. Do not smoke. Avoid drinking alcohol. Call your caregiver for routine follow-up as advised.   SEEK IMMEDIATE MEDICAL CARE IF:  · You develop increased chest pain or pain that radiates to the arm, neck, jaw, back, or abdomen.   · You develop shortness of breath, increased coughing, or you start coughing up blood.   · You have severe back or abdominal pain, nausea, or vomiting.    · You develop severe weakness, fainting, fever, or chills.   Document Released: 12/18/2006 Document Revised: 03/11/2013 Document Reviewed: 06/06/2008  ExitCare® Patient Information ©2013 [employer].      Exercise Stress Electrocardiogram  An exercise stress electrocardiogram is a test to check how blood flows to your heart. It is done to find areas of poor blood flow. You will need to walk on a treadmill for this test. The electrocardiogram will record your heartbeat when you are at rest and when you are exercising.  What happens before the procedure?  · Do not have drinks with caffeine or foods with caffeine for 24 hours before the test, or as told by your doctor. This includes coffee, tea (even decaf tea), sodas, chocolate, and cocoa.  · Follow your doctor's instructions about eating and drinking before the test.  · Ask your doctor what medicines you should or should not take before the test. Take your medicines with water unless told by your doctor not to.  · If you use an inhaler, bring it with you to the test.  · Bring a snack to eat after the test.  · Do not  smoke for 4 hours before the test.  · Do not put lotions, powders, creams, or oils on your chest before the test.  · Wear comfortable shoes and clothing.  What happens during the procedure?  · You will have patches put on your chest. Small areas of your chest may need to be shaved. Wires will be connected to the patches.  · Your heart rate will be watched while you are resting and while you are exercising.  · You will walk on the treadmill. The treadmill will slowly get faster to raise your heart rate.  · The test will take about 1-2 hours.  What happens after the procedure?  · Your heart rate and blood pressure will be watched after the test.  · You may return to your normal diet, activities, and medicines or as told by your doctor.  This information is not intended to replace advice given to you by your health care provider. Make sure you discuss  any questions you have with your health care provider.  Document Released: 06/05/2009 Document Revised: 08/16/2017 Document Reviewed: 08/25/2014  [employer] Interactive Patient Education © 2017 [employer]      Depression / Suicide Risk    As you are discharged from this [facility] facility, it is important to learn how to keep safe from harming yourself.    Recognize the warning signs:  · Abrupt changes in personality, positive or negative- including increase in energy   · Giving away possessions  · Change in eating patterns- significant weight changes-  positive or negative  · Change in sleeping patterns- unable to sleep or sleeping all the time   · Unwillingness or inability to communicate  · Depression  · Unusual sadness, discouragement and loneliness  · Talk of wanting to die  · Neglect of personal appearance   · Rebelliousness- reckless behavior  · Withdrawal from people/activities they love  · Confusion- inability to concentrate     If you or a loved one observes any of these behaviors or has concerns about self-harm, here's what you can do:  · Talk about it- your feelings and reasons for harming yourself  · Remove any means that you might use to hurt yourself (examples: pills, rope, extension cords, firearm)  · Get professional help from the community (Mental Health, Substance Abuse, psychological counseling)  · Do not be alone:Call your Safe Contact- someone whom you trust who will be there for you.  · Call your local CRISIS HOTLINE 434-9208 or 514-976-2041  · Call your local Children's Mobile Crisis Response Team [place] (727) 691-7504 or www.[employer]  · Call the toll free National Suicide Prevention Hotlines   · National Suicide Prevention Lifeline 836-534-DCZA (1550)  · National Hope Line Network 800-SUICIDE (073-8483)

## 2019-05-23 NOTE — PROGRESS NOTES
No changes in pt status. Pt ambulating hallways. No pain, SOB, dizziness. Stress test results still not posted. Call placed to radiology supervisor Annalise ferreira, who contacted supervisor Carol in [facility] to investigate issue. Will call this RN back with resolution. Pt and spouse updated, no additional needs.

## 2019-05-23 NOTE — PROGRESS NOTES
Received telephone report from ER nurse Kathy. Plan of care discussed. Waiting for patient to arrive to room.

## 2019-05-23 NOTE — H&P
"Hospital Medicine History & Physical Note    Date of Service  5/22/2019    Primary Care Physician  Aurelio ASHFORD M.D.    Consultants  None    Code Status  Full Code    Chief Complaint  Chest Pain    History of Presenting Illness  64 y.o. Male, h/o HTN and HLD who presented to the ER on 5/22/2019 with Chest Pain:    1) CHEST PAIN:  -Onset: 7:45 pm  -Mid chest with radiation to bilateral arms/ hands with tingling  -Patient states that pain was like \" unusual chest pressure/dyscomfort\".   -Associated symptoms: SOB, Nausea and \"feeling hot sensation\"  -Episode resolved with no interventions after 45 min, while he was here in the ED    -Pertinent cardiology Hx: Both parents had open heart bypass surgery. Patient has a pacemaker implanted (most recently interrogated yesterday, and per report it was fine). Follows with Cardiologist Dr. Cam Kapoor. Had a Calcium CT done in [facility], and reported moderate calcification. No recent Stress test    Review of Systems  Review of Systems   Constitutional: Negative for fever.   HENT: Negative for congestion and sore throat.    Eyes: Negative for blurred vision and double vision.   Respiratory: Positive for shortness of breath. Negative for cough.    Cardiovascular: Positive for chest pain. Negative for palpitations.   Gastrointestinal: Negative for nausea and vomiting.   Genitourinary: Negative for dysuria and urgency.   Musculoskeletal: Negative for myalgias and neck pain.   Skin: Negative for itching and rash.   Neurological: Negative for dizziness, weakness and headaches.   Endo/Heme/Allergies: Does not bruise/bleed easily.   Psychiatric/Behavioral: Negative for depression. The patient does not have insomnia.        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    Surgical History   has a past surgical history that includes pacemaker insertion (Left, 09/2017).     Family History  family history " includes Heart Disease in his father and mother; Hypertension in his father and mother.     Social History   reports that he has never smoked. He has never used smokeless tobacco. He reports that he drinks about 1.2 oz of alcohol per week . He reports that he does not use drugs.    Allergies  Allergies   Allergen Reactions   • Sulfa Drugs Hives       Medications  Prior to Admission Medications   Prescriptions Last Dose Informant Patient Reported? Taking?   Cetirizine HCl (ZYRTEC ALLERGY PO)   Yes No   Sig: Take 1 Tab by mouth as needed.   aspirin 81 MG tablet 5/22/2019 at AM Family Member Yes No   Sig: Take 81 mg by mouth every day.   potassium chloride ER (KLOR-CON 10) 10 MEQ tablet 5/22/2019 at AM  No No   Sig: Take 1 Tab by mouth 2 times a day.   ramipril (ALTACE) 10 MG capsule 5/22/2019 at AM Family Member Yes No   Sig: Take 10 mg by mouth 2 Times a Day.   rosuvastatin (CRESTOR) 10 MG Tab 5/21/2019 at PM  No No   Sig: Take 1 Tab by mouth every evening.      Facility-Administered Medications: None       Physical Exam  Temp:  [36.9 °C (98.4 °F)] 36.9 °C (98.4 °F)  Pulse:  [65-85] 65  Resp:  [18-25] 24  BP: (173)/(98) 173/98  SpO2:  [93 %-97 %] 93 %    Physical Exam   Constitutional: He is oriented to person, place, and time. He appears well-developed and well-nourished.   HENT:   Head: Normocephalic and atraumatic.   Eyes: Pupils are equal, round, and reactive to light. EOM are normal.   Neck: Normal range of motion. Neck supple.   Cardiovascular: Normal rate and regular rhythm.    Pulmonary/Chest: Effort normal and breath sounds normal.   Abdominal: Soft. Bowel sounds are normal.   Musculoskeletal: Normal range of motion. He exhibits no edema.   Neurological: He is alert and oriented to person, place, and time.   Skin: Skin is warm and dry.   Psychiatric: He has a normal mood and affect. His behavior is normal.       Laboratory:  Recent Labs      05/22/19   2100   WBC  8.1   RBC  5.82   HEMOGLOBIN  17.2  "  HEMATOCRIT  48.6   MCV  83.5   MCH  29.6   MCHC  35.4*   RDW  38.1   PLATELETCT  235   MPV  9.7     Recent Labs      05/22/19   2100   SODIUM  137   POTASSIUM  3.7   CHLORIDE  100   CO2  27   GLUCOSE  119*   BUN  22   CREATININE  1.04   CALCIUM  9.3     Recent Labs      05/22/19   2100   ALTSGPT  35   ASTSGOT  29   ALKPHOSPHAT  98   TBILIRUBIN  0.6   LIPASE  32   GLUCOSE  119*                 Recent Labs      05/22/19   2100   TROPONINI  0.03       EKG: NSR @ 74 bpm. Normal axis. Diffuse non spefic ST depression with T wave inversion on anteroinferior leads. No acute ST elevation.    Urinalysis:    No results found     Imaging:  [id number] VIEWS   Final Result      No acute cardiopulmonary abnormality.            Assessment/Plan:  I anticipate this patient is appropriate for observation status at this time.    * Atypical chest pain   Assessment & Plan    -Troponin on admission: Negative  -EKG findings: Abnormal EKGs with T wave inversions and ST depression  -Serial cardiac enzymes every 4 hours ×3  -MONAB  -NPO for Stress test in am  -Cardiologist: Dr. Kapoor     Cardiac pacemaker in situ- (present on admission)   Assessment & Plan    -Pacemaker was interrogated yesterday and per patient reports everything was \"fine \".     Essential hypertension, benign- (present on admission)   Assessment & Plan    -Uncontrolled.  Initial blood pressure is 166/84.  -We will start Nitropaste right now.  -Patient takes ramipril for hypertension at home that I will continue.     Family history of premature coronary artery disease- (present on admission)   Assessment & Plan    -As above     Dyslipidemia- (present on admission)   Assessment & Plan    -Continue rosuvastatin.     Elevated glucose   Assessment & Plan    -Initial glucose is 119.  He does not have history of diabetes.  Added hemoglobin A1c.         VTE prophylaxis: lovenox    "

## 2019-05-23 NOTE — DISCHARGE SUMMARY
Discharge Summary    CHIEF COMPLAINT ON ADMISSION  Chief Complaint   Patient presents with   • Chest Pain     started about 30 minutes ago, mid chest pain w/o radiation, some SOB, and tingling in hands, denies any nausea. Pt does have a pacemaker.        Reason for Admission  Chest Pain     Admission Date  5/22/2019    CODE STATUS  Full Code    HPI & HOSPITAL COURSE  This is a 64 y.o. male With a history of symptomatic bradycardia status post pacemaker, here with chest tightness.  This occurred at rest while he was watching a movie.  He felt he has strange sensation in his arms and became nervous this was related to his heart.  His pacemaker has been interrogated 1 day prior and was normal.  Upon his presentation to the emergency department his vital signs were normal and EKG showed no evidence of ischemia.  Troponins were trended and remained negative therefore he underwent a stress test that showed no evidence of reversible ischemia.  He had a normal ejection fraction.  He remained paced on telemetry.  His blood pressure remained stable.  His chest x-ray showed no evidence of pneumonia.  He had no recurrence of his symptoms during his hospital stay therefore cardiac etiology was ruled out he was determined stable for discharge.  He does follow with cardiology as an outpatient due to pacemaker and he will continue to follow-up with them.  He will return to the emergency department if he gets recurrence or more severe symptoms.       Therefore, he is discharged in good and stable condition to home with close outpatient follow-up.    The patient recovered much more quickly than anticipated on admission.    Discharge Date  5/23/19    FOLLOW UP ITEMS POST DISCHARGE  F/U with cardiology as needed    DISCHARGE DIAGNOSES  Principal Problem:    Atypical chest pain POA: Unknown  Active Problems:    Essential hypertension, benign POA: Yes    Cardiac pacemaker in situ POA: Yes      Overview: September 2017: [employer]  Assurity MRI 2272 implanted by Dr. Chahal.    Dyslipidemia POA: Yes    Family history of premature coronary artery disease POA: Yes    Elevated glucose POA: Unknown  Resolved Problems:    * No resolved hospital problems. *      FOLLOW UP  Future Appointments  Date Time Provider Department Center   11/26/2019 10:00 AM [facility] None     Aurelio ASHFORD M.D.  [address]  [facility] [address] [phone]      PLease call to schedule a follow up appointment, thank you       MEDICATIONS ON DISCHARGE     Medication List      CONTINUE taking these medications      Instructions   aspirin 81 MG tablet   Take 81 mg by mouth every day.  Dose:  81 mg     potassium chloride ER 10 MEQ tablet  Commonly known as:  KLOR-CON 10   Take 1 Tab by mouth 2 times a day.  Dose:  10 mEq     ramipril 10 MG capsule  Commonly known as:  ALTACE   Take 10 mg by mouth 2 Times a Day.  Dose:  10 mg     rosuvastatin 10 MG Tabs  Commonly known as:  CRESTOR   Take 1 Tab by mouth every evening.  Dose:  10 mg     ZYRTEC ALLERGY PO   Take 1 Tab by mouth as needed.  Dose:  1 Tab            Allergies  Allergies   Allergen Reactions   • Sulfa Drugs Hives       DIET  Orders Placed This Encounter   Procedures   • Diet Order Regular     Standing Status:   Standing     Number of Occurrences:   1     Order Specific Question:   Diet:     Answer:   Regular [1]     Order Specific Question:   Miscellaneous modifications:     Answer:   No Decaf, No Caffeine(for test) [11]     Comments:   No caffeine for 12 hours prior to exam (decaf, coffee, cola, tea, chocolate)       ACTIVITY  As tolerated.  Weight bearing as tolerated    CONSULTATIONS  None    PROCEDURES  None    LABORATORY  Lab Results   Component Value Date    SODIUM 137 05/23/2019    POTASSIUM 4.1 05/23/2019    CHLORIDE 104 05/23/2019    CO2 26 05/23/2019    GLUCOSE 114 (H) 05/23/2019    BUN 22 05/23/2019    CREATININE 0.96 05/23/2019        Lab Results   Component Value Date    WBC 6.6  05/23/2019    HEMOGLOBIN 16.4 05/23/2019    HEMATOCRIT 47.2 05/23/2019    PLATELETCT 232 05/23/2019        Total time of the discharge process exceeds 45 minutes.

## 2019-05-23 NOTE — PROGRESS NOTES
2 RN skin check complete with NELA Jacobs.   Devices in place cardiac monitoring.  Skin assessed under devices cardiac monitoring.  Confirmed pressure ulcers found on n/a.  New potential pressure ulcers noted on n/a.  The following interventions in place remind patient to make frequent turns.

## 2019-05-24 LAB
EST. AVERAGE GLUCOSE BLD GHB EST-MCNC: 117 MG/DL
HBA1C MFR BLD: 5.7 % (ref 0–5.6)

## 2019-05-24 NOTE — PROGRESS NOTES
Stress test result posted, MD updated, spoke to pt at bedside. D/c order received. D/c instructions reviewed with pt and spouse, questions addressed. IV and tele removed. Pt ambulated out with spouse, steady gait, all belongings.

## 2019-07-09 DIAGNOSIS — I10 ESSENTIAL HYPERTENSION: ICD-10-CM

## 2019-07-14 RX ORDER — POTASSIUM CHLORIDE 750 MG/1
10 TABLET, FILM COATED, EXTENDED RELEASE ORAL 2 TIMES DAILY
Qty: 180 TAB | Refills: 2 | Status: SHIPPED | OUTPATIENT
Start: 2019-07-14 | End: 2020-04-13

## 2019-09-27 ENCOUNTER — APPOINTMENT (RX ONLY)
Dept: URBAN - METROPOLITAN AREA CLINIC 35 | Facility: CLINIC | Age: 64
Setting detail: DERMATOLOGY
End: 2019-09-27

## 2019-09-27 DIAGNOSIS — Z71.89 OTHER SPECIFIED COUNSELING: ICD-10-CM

## 2019-09-27 DIAGNOSIS — L82.1 OTHER SEBORRHEIC KERATOSIS: ICD-10-CM

## 2019-09-27 DIAGNOSIS — L81.4 OTHER MELANIN HYPERPIGMENTATION: ICD-10-CM

## 2019-09-27 DIAGNOSIS — D22 MELANOCYTIC NEVI: ICD-10-CM

## 2019-09-27 PROBLEM — D22.61 MELANOCYTIC NEVI OF RIGHT UPPER LIMB, INCLUDING SHOULDER: Status: ACTIVE | Noted: 2019-09-27

## 2019-09-27 PROCEDURE — ? COUNSELING

## 2019-09-27 PROCEDURE — 99213 OFFICE O/P EST LOW 20 MIN: CPT

## 2019-09-27 ASSESSMENT — LOCATION SIMPLE DESCRIPTION DERM: LOCATION SIMPLE: RIGHT SHOULDER

## 2019-09-27 ASSESSMENT — LOCATION ZONE DERM: LOCATION ZONE: ARM

## 2019-09-27 ASSESSMENT — LOCATION DETAILED DESCRIPTION DERM: LOCATION DETAILED: RIGHT POSTERIOR SHOULDER

## 2019-12-05 ENCOUNTER — RX ONLY (OUTPATIENT)
Age: 64
Setting detail: RX ONLY
End: 2019-12-05

## 2019-12-09 ENCOUNTER — NON-PROVIDER VISIT (OUTPATIENT)
Dept: CARDIOLOGY | Facility: MEDICAL CENTER | Age: 64
End: 2019-12-09
Payer: COMMERCIAL

## 2019-12-09 VITALS
DIASTOLIC BLOOD PRESSURE: 90 MMHG | SYSTOLIC BLOOD PRESSURE: 128 MMHG | OXYGEN SATURATION: 96 % | WEIGHT: 229 LBS | HEART RATE: 94 BPM | BODY MASS INDEX: 30.35 KG/M2 | HEIGHT: 73 IN

## 2019-12-09 DIAGNOSIS — Z95.0 CARDIAC PACEMAKER IN SITU: ICD-10-CM

## 2019-12-09 DIAGNOSIS — I44.2 AV BLOCK, 3RD DEGREE (HCC): ICD-10-CM

## 2019-12-09 DIAGNOSIS — R07.89 ATYPICAL CHEST PAIN: ICD-10-CM

## 2019-12-09 PROBLEM — R73.09 ELEVATED GLUCOSE: Status: RESOLVED | Noted: 2019-05-22 | Resolved: 2019-12-09

## 2019-12-09 PROCEDURE — 93280 PM DEVICE PROGR EVAL DUAL: CPT | Performed by: NURSE PRACTITIONER

## 2019-12-09 NOTE — PROGRESS NOTES
Since the last PM check in May 2019, he was seen in the ER the next day for atypical chest pain (experience while watching a movie with his wife). MPI was negative for ischemia or infarct,  LVEF 52%. No recurrence of symptoms.    Device is working normally. No mode switching episodes.  Normal sensing and capture of RA and RV leads; stable impedances. Battery longevity is 8.6-9.1 years.  No changes are made today.    He does have FU with Dr. Murray on 1/23/2020, and should keep this.    FU in 6 months for next PM check with me.    Collaborating MD: Dori

## 2020-01-14 DIAGNOSIS — E78.5 DYSLIPIDEMIA: ICD-10-CM

## 2020-01-20 ENCOUNTER — TELEPHONE (OUTPATIENT)
Dept: CARDIOLOGY | Facility: MEDICAL CENTER | Age: 65
End: 2020-01-20

## 2020-01-20 DIAGNOSIS — E78.5 DYSLIPIDEMIA: ICD-10-CM

## 2020-01-20 DIAGNOSIS — I10 ESSENTIAL HYPERTENSION: ICD-10-CM

## 2020-01-20 NOTE — TELEPHONE ENCOUNTER
CMP and Lipid profile ordered.     Called pt and notified, he will go to [facility] lab to get the blood test done

## 2020-01-20 NOTE — TELEPHONE ENCOUNTER
SW        Patient is calling to ask if he needs any labs done before his appt on 01/23. He can be reached at [phone].

## 2020-01-21 ENCOUNTER — HOSPITAL ENCOUNTER (OUTPATIENT)
Dept: LAB | Facility: MEDICAL CENTER | Age: 65
End: 2020-01-21
Attending: INTERNAL MEDICINE
Payer: COMMERCIAL

## 2020-01-21 DIAGNOSIS — I10 ESSENTIAL HYPERTENSION: ICD-10-CM

## 2020-01-21 DIAGNOSIS — E78.5 DYSLIPIDEMIA: ICD-10-CM

## 2020-01-21 LAB
ALBUMIN SERPL BCP-MCNC: 4.2 G/DL (ref 3.2–4.9)
ALBUMIN/GLOB SERPL: 1.7 G/DL
ALP SERPL-CCNC: 91 U/L (ref 30–99)
ALT SERPL-CCNC: 33 U/L (ref 2–50)
ANION GAP SERPL CALC-SCNC: 8 MMOL/L (ref 0–11.9)
AST SERPL-CCNC: 26 U/L (ref 12–45)
BILIRUB SERPL-MCNC: 0.8 MG/DL (ref 0.1–1.5)
BUN SERPL-MCNC: 17 MG/DL (ref 8–22)
CALCIUM SERPL-MCNC: 9.7 MG/DL (ref 8.5–10.5)
CHLORIDE SERPL-SCNC: 104 MMOL/L (ref 96–112)
CHOLEST SERPL-MCNC: 120 MG/DL (ref 100–199)
CO2 SERPL-SCNC: 28 MMOL/L (ref 20–33)
CREAT SERPL-MCNC: 1 MG/DL (ref 0.5–1.4)
FASTING STATUS PATIENT QL REPORTED: NORMAL
GLOBULIN SER CALC-MCNC: 2.5 G/DL (ref 1.9–3.5)
GLUCOSE SERPL-MCNC: 116 MG/DL (ref 65–99)
HDLC SERPL-MCNC: 36 MG/DL
LDLC SERPL CALC-MCNC: 62 MG/DL
POTASSIUM SERPL-SCNC: 4.1 MMOL/L (ref 3.6–5.5)
PROT SERPL-MCNC: 6.7 G/DL (ref 6–8.2)
SODIUM SERPL-SCNC: 140 MMOL/L (ref 135–145)
TRIGL SERPL-MCNC: 112 MG/DL (ref 0–149)

## 2020-01-21 PROCEDURE — 80061 LIPID PANEL: CPT

## 2020-01-21 PROCEDURE — 80053 COMPREHEN METABOLIC PANEL: CPT

## 2020-01-21 PROCEDURE — 36415 COLL VENOUS BLD VENIPUNCTURE: CPT

## 2020-01-23 ENCOUNTER — OFFICE VISIT (OUTPATIENT)
Dept: CARDIOLOGY | Facility: MEDICAL CENTER | Age: 65
End: 2020-01-23
Payer: COMMERCIAL

## 2020-01-23 VITALS
SYSTOLIC BLOOD PRESSURE: 126 MMHG | BODY MASS INDEX: 30.67 KG/M2 | DIASTOLIC BLOOD PRESSURE: 74 MMHG | OXYGEN SATURATION: 94 % | HEIGHT: 73 IN | HEART RATE: 68 BPM | WEIGHT: 231.4 LBS

## 2020-01-23 DIAGNOSIS — I25.10 CORONARY ARTERY CALCIFICATION SEEN ON CAT SCAN: ICD-10-CM

## 2020-01-23 DIAGNOSIS — E78.5 DYSLIPIDEMIA: ICD-10-CM

## 2020-01-23 DIAGNOSIS — Z95.0 CARDIAC PACEMAKER IN SITU: ICD-10-CM

## 2020-01-23 DIAGNOSIS — Z82.49 FAMILY HISTORY OF PREMATURE CORONARY ARTERY DISEASE: ICD-10-CM

## 2020-01-23 PROCEDURE — 99214 OFFICE O/P EST MOD 30 MIN: CPT | Performed by: INTERNAL MEDICINE

## 2020-01-23 RX ORDER — INFLUENZA A VIRUS A/BRISBANE/02/2018 IVR-190 (H1N1) ANTIGEN (FORMALDEHYDE INACTIVATED), INFLUENZA A VIRUS A/KANSAS/14/2017 X-327 (H3N2) ANTIGEN (FORMALDEHYDE INACTIVATED), INFLUENZA B VIRUS B/PHUKET/3073/2013 ANTIGEN (FORMALDEHYDE INACTIVATED), AND INFLUENZA B VIRUS B/MARYLAND/15/2016 BX-69A ANTIGEN (FORMALDEHYDE INACTIVATED) 15; 15; 15; 15 UG/.5ML; UG/.5ML; UG/.5ML; UG/.5ML
INJECTION, SUSPENSION INTRAMUSCULAR
Refills: 0 | COMMUNITY
Start: 2019-10-31 | End: 2020-06-16

## 2020-01-23 ASSESSMENT — ENCOUNTER SYMPTOMS
COUGH: 0
LOSS OF CONSCIOUSNESS: 0
MYALGIAS: 0
DIZZINESS: 0
PALPITATIONS: 0
SHORTNESS OF BREATH: 0

## 2020-01-23 NOTE — PROGRESS NOTES
Chief Complaint   Patient presents with   • Pacemaker Check/Dysfunction   • AV Block Complete     AV block, 3rd degree       Subjective:   Sanya Miranda is a 64 y.o. male who presents today for follow-up evaluation of permanent pacemaker with a history of hypertension and family history of premature heart disease.    Last seen on 1/2/2019.    Since 1/2/2019 the patient was hospitalized 5/2019 for chest pain.  MI ruled out.  MPI on 5/23/2018 was normal.  No subsequent cardiac symptoms  Retired this year.    Patient had previously been followed by Dr. Anabel Dubose for hypertension and dyslipidemia.    Since 11/20/2018 has had no cardiac symptoms.  The patient's been on niacin for many years per his PCP.  The patient has significant family history of premature heart disease.    Past Medical History:   Diagnosis Date   • Atypical chest pain 05/2019    ER visit for chest pain. MPI negative for ischemia or infarct, LVEF 52%.   • AV block, 3rd degree (HCC) 09/2017    Status post PM implantation   • Hyperlipidemia    • Hypertension      Past Surgical History:   Procedure Laterality Date   • PACEMAKER INSERTION Left 09/2017    [employer] Assurity MRI 2272 implanted by Dr. Chahal.     Family History   Problem Relation Age of Onset   • Heart Disease Mother         CABG at age late 60   • Hypertension Mother    • Heart Disease Father         CABG at age 83   • Hypertension Father      Social History     Socioeconomic History   • Marital status:      Spouse name: Not on file   • Number of children: Not on file   • Years of education: Not on file   • Highest education level: Not on file   Occupational History   • Not on file   Social Needs   • Financial resource strain: Not on file   • Food insecurity:     Worry: Not on file     Inability: Not on file   • Transportation needs:     Medical: Not on file     Non-medical: Not on file   Tobacco Use   • Smoking status: Never Smoker   • Smokeless tobacco: Never Used    Substance and Sexual Activity   • Alcohol use: Yes     Alcohol/week: 1.2 oz     Types: 2 Glasses of wine per week     Comment: 2-3 per week   • Drug use: No   • Sexual activity: Not on file   Lifestyle   • Physical activity:     Days per week: Not on file     Minutes per session: Not on file   • Stress: Not on file   Relationships   • Social connections:     Talks on phone: Not on file     Gets together: Not on file     Attends [employer] service: Not on file     Active member of club or organization: Not on file     Attends meetings of clubs or organizations: Not on file     Relationship status: Not on file   • Intimate partner violence:     Fear of current or ex partner: Not on file     Emotionally abused: Not on file     Physically abused: Not on file     Forced sexual activity: Not on file   Other Topics Concern   • Not on file   Social History Narrative   • Not on file     Allergies   Allergen Reactions   • Sulfa Drugs Hives     Outpatient Encounter Medications as of 1/23/2020   Medication Sig Dispense Refill   • potassium chloride ER (KLOR-CON 10) 10 MEQ tablet Take 1 Tab by mouth 2 times a day. 180 Tab 2   • rosuvastatin (CRESTOR) 10 MG Tab Take 1 Tab by mouth every evening. 90 Tab 3   • Cetirizine HCl (ZYRTEC ALLERGY PO) Take 1 Tab by mouth as needed.     • ramipril (ALTACE) 10 MG capsule Take 10 mg by mouth 2 Times a Day.     • aspirin 81 MG tablet Take 81 mg by mouth every day.     • FLUZONE QUADRIVALENT 0.5 ML Suspension Prefilled Syringe injection TO BE ADMINISTERED BY PHARMACIST FOR IMMUNIZATION  0     No facility-administered encounter medications on file as of 1/23/2020.      Review of Systems   Respiratory: Negative for cough and shortness of breath.    Cardiovascular: Negative for chest pain and palpitations.   Musculoskeletal: Negative for myalgias.   Neurological: Negative for dizziness and loss of consciousness.        Objective:   /74 (BP Location: Left arm, Patient Position: Sitting, BP  "Cuff Size: Adult)   Pulse 68   Ht 1.854 m (6' 1\")   Wt 105 kg (231 lb 6.4 oz)   SpO2 94%   BMI 30.53 kg/m²     Physical Exam   Constitutional: He is oriented to person, place, and time. He appears well-developed and well-nourished.   HENT:   Head: Normocephalic and atraumatic.   Eyes: Pupils are equal, round, and reactive to light. EOM are normal. No scleral icterus.   Neck: Neck supple. No JVD present. No thyromegaly present.   Cardiovascular: Normal rate, regular rhythm, normal heart sounds and intact distal pulses. Exam reveals no gallop and no friction rub.   No murmur heard.  Pulmonary/Chest: Effort normal and breath sounds normal. No respiratory distress. He has no wheezes. He has no rales.   Pacemaker generator left subclavian area.   Abdominal: Soft. Bowel sounds are normal. He exhibits no mass. There is no tenderness.   Musculoskeletal: Normal range of motion.         General: No deformity or edema.   Lymphadenopathy:     He has no cervical adenopathy.   Neurological: He is alert and oriented to person, place, and time. No cranial nerve deficit. He exhibits normal muscle tone.   Skin: Skin is warm and dry.   Psychiatric: He has a normal mood and affect. His behavior is normal.     Event monitor: 1/1/16   No significant arrhythmias pauses noted underlying rhythm was sinus.     Stress echo: 12/17/15  Negative stress echocardiogram.  No evidence of ischemia or infarct.  Normal resting left ventricular systolic function with ejection   fraction of 60%.  Fair exercise tolerance.  Resting EKG shows normal sinus rhythm with incomplete right bundle   branch block.  Non-specific ST changes with stress.  No arrhythmias noted     TTE: 12/10/15  No prior study is available for comparison.   Left ventricular ejection fraction is visually estimated to be 65%.   Normal regional wall motion. Grade I diastolic dysfunction.  No significant valve abnormalities.   Unable to estimate pulmonary artery pressure due to an " inadequate   tricuspid regurgitant jet.   Ascending aorta diameter is 3.7 cm    Transthoracic echo: 3/24/17  Normal left ventricular chamber size. Mild left ventricular septal hypertrophy. Left ventricular ejection fraction is visually estimated to be 55%.  Grade I diastolic dysfunction.  Normal right ventricular size and systolic function.  Prolapse of the posterior mitral leaflet was present.  Trace tricuspid regurgitation.  Normal pericardium without effusion.  Dilated ascending aorta at 3.9 cm    EKG: 10/1/15  Sinus rhythm, right bundle branch block    MPI 05/23/2019   Normal myocardial perfusion with no ischemia.   Normal left ventricular wall motion.  LV ejection fraction = 52%. TID absent.    Assessment:     1. Coronary artery calcification seen on CAT scan     2. Dyslipidemia     3. Cardiac pacemaker in situ     4. Family history of premature coronary artery disease         Medical Decision Making:  Today's Assessment / Status / Plan:     1.  CAD manifested by coronary calcification.  2.  Dyslipidemia.  3.  Permanent pacemaker.  4.  Family history of premature heart disease.  5.  Hypertension.    Recommendation Discussion  1.  The patient is stable from a cardiac standpoint.  2.  Continue current cardiac therapy.  3.  Reviewed recent 1/2020 lipid panel which is normal LDL at goal 62.  4.  Pacemaker interrogation reviewed 12/2019 shows normal function.  5.  Continue pacemaker surveillance.  6.  Follow-up 1 year.

## 2020-01-24 RX ORDER — ROSUVASTATIN CALCIUM 10 MG/1
TABLET, COATED ORAL
Qty: 30 TAB | Refills: 0 | Status: SHIPPED | OUTPATIENT
Start: 2020-01-24 | End: 2020-02-21

## 2020-02-21 DIAGNOSIS — E78.5 DYSLIPIDEMIA: ICD-10-CM

## 2020-02-22 RX ORDER — ROSUVASTATIN CALCIUM 10 MG/1
TABLET, COATED ORAL
Qty: 90 TAB | Refills: 3 | Status: SHIPPED | OUTPATIENT
Start: 2020-02-22 | End: 2021-02-17 | Stop reason: SDUPTHER

## 2020-04-11 DIAGNOSIS — I10 ESSENTIAL HYPERTENSION: ICD-10-CM

## 2020-04-13 RX ORDER — POTASSIUM CHLORIDE 750 MG/1
TABLET, FILM COATED, EXTENDED RELEASE ORAL
Qty: 180 TAB | Refills: 2 | Status: SHIPPED | OUTPATIENT
Start: 2020-04-13 | End: 2021-04-19

## 2020-06-16 ENCOUNTER — NON-PROVIDER VISIT (OUTPATIENT)
Dept: CARDIOLOGY | Facility: MEDICAL CENTER | Age: 65
End: 2020-06-16
Payer: MEDICARE

## 2020-06-16 VITALS
HEART RATE: 68 BPM | DIASTOLIC BLOOD PRESSURE: 80 MMHG | WEIGHT: 227 LBS | OXYGEN SATURATION: 96 % | SYSTOLIC BLOOD PRESSURE: 130 MMHG | BODY MASS INDEX: 30.09 KG/M2 | HEIGHT: 73 IN

## 2020-06-16 DIAGNOSIS — I44.2 AV BLOCK, 3RD DEGREE (HCC): ICD-10-CM

## 2020-06-16 DIAGNOSIS — Z95.0 CARDIAC PACEMAKER IN SITU: ICD-10-CM

## 2020-06-16 PROCEDURE — 93280 PM DEVICE PROGR EVAL DUAL: CPT | Performed by: NURSE PRACTITIONER

## 2020-06-16 ASSESSMENT — FIBROSIS 4 INDEX: FIB4 SCORE: 1.27

## 2020-06-16 NOTE — PROGRESS NOTES
Device is working normally. 7 brief mode switching episodes (all <1 minutes, <1% of total time).  Normal sensing and capture of RA and RV leads; stable impedances. Battery longevity is 8.5-9.1 years.  No changes are made today.    FU in 6 months for next PM check with me.    He is due to see Dr. Murray in January 2021 for annual follow-up.    Collaborating MD: Dori

## 2020-10-01 ENCOUNTER — APPOINTMENT (RX ONLY)
Dept: URBAN - METROPOLITAN AREA CLINIC 35 | Facility: CLINIC | Age: 65
Setting detail: DERMATOLOGY
End: 2020-10-01

## 2020-10-01 DIAGNOSIS — L82.1 OTHER SEBORRHEIC KERATOSIS: ICD-10-CM

## 2020-10-01 DIAGNOSIS — L81.4 OTHER MELANIN HYPERPIGMENTATION: ICD-10-CM

## 2020-10-01 DIAGNOSIS — Z71.89 OTHER SPECIFIED COUNSELING: ICD-10-CM

## 2020-10-01 DIAGNOSIS — D22 MELANOCYTIC NEVI: ICD-10-CM

## 2020-10-01 DIAGNOSIS — L57.0 ACTINIC KERATOSIS: ICD-10-CM

## 2020-10-01 PROBLEM — D22.61 MELANOCYTIC NEVI OF RIGHT UPPER LIMB, INCLUDING SHOULDER: Status: ACTIVE | Noted: 2020-10-01

## 2020-10-01 PROCEDURE — ? LIQUID NITROGEN

## 2020-10-01 PROCEDURE — 99213 OFFICE O/P EST LOW 20 MIN: CPT | Mod: 25

## 2020-10-01 PROCEDURE — ? COUNSELING

## 2020-10-01 PROCEDURE — 17003 DESTRUCT PREMALG LES 2-14: CPT

## 2020-10-01 PROCEDURE — 17000 DESTRUCT PREMALG LESION: CPT

## 2020-10-01 ASSESSMENT — LOCATION DETAILED DESCRIPTION DERM
LOCATION DETAILED: LEFT LATERAL EYEBROW
LOCATION DETAILED: RIGHT CENTRAL MALAR CHEEK
LOCATION DETAILED: RIGHT POSTERIOR SHOULDER

## 2020-10-01 ASSESSMENT — LOCATION SIMPLE DESCRIPTION DERM
LOCATION SIMPLE: LEFT EYEBROW
LOCATION SIMPLE: RIGHT CHEEK
LOCATION SIMPLE: RIGHT SHOULDER

## 2020-10-01 ASSESSMENT — LOCATION ZONE DERM
LOCATION ZONE: ARM
LOCATION ZONE: FACE

## 2020-10-01 NOTE — PROCEDURE: LIQUID NITROGEN
Render Post-Care Instructions In Note?: no
Duration Of Freeze Thaw-Cycle (Seconds): 10
Number Of Freeze-Thaw Cycles: 1 freeze-thaw cycle
Post-Care Instructions: I reviewed with the patient in detail post-care instructions. Patient is to wear sunprotection, and avoid picking at any of the treated lesions. Pt may apply Vaseline to crusted or scabbing areas.
Detail Level: Detailed
Consent: The patient's consent was obtained including but not limited to risks of crusting, scabbing, blistering, scarring, darker or lighter pigmentary change, recurrence, incomplete removal and infection.

## 2020-10-05 NOTE — PROGRESS NOTES
Telemetry Shift Summary    Rhythm 100% AV Paced  HR Range 60s  Ectopy fPVC  Measurements 0.18/0.16/0.42        Normal Values  Rhythm SR  HR Range    Measurements 0.12-0.20 / 0.06-0.10  / 0.30-0.52   98.7

## 2020-11-17 NOTE — TELEPHONE ENCOUNTER
Calcium score:  390.5    The visualized portions of the lungs, mediastinum, and upper abdomen are within normal limits. Benign cystic-appearing lesion appears to be present in the left lobe of the liver. Cysts were documented on prior ultrasound.      Impression       1.  1.  There is definite, at least moderate, atherosclerotic plaque burden.  2.  3.  2.  Nonobstructive coronary artery disease is highly likely and obstructive disease is possible.  4.  5.  3.  The patient's cardiovascular risk is moderately high.  6.  7.  4.  Aggressive risk factor modification is suggested.  8.  9.  5.  Further evaluation should be based upon global assessment of cardiovascular risk factors in addition to this test. Testing for inducible ischemia may be indicated depending upon the patient's global assessment.       To SW to advise on result  
Contacted patient.  Discussed SW recommendations. Patient is agreeable.  Discuss Crestor and possible side effects and goals.  Discussed diet.      Rx sent to [facility] (in [employer])  Labs ordered and mailed slip to patient residence.        
Coronary calcification score elevated.  Recommendations  1.  Discontinue niacin  2.  Crestor 10 mg daily.  3.  Lipid panel 4 weeks after starting Crestor.  4.  Informed patient that LDL goal is less than 70.  5.  Low simple carbohydrate diet information.  
Med Rec - Admission

## 2020-12-29 ENCOUNTER — TELEPHONE (OUTPATIENT)
Dept: HEALTH INFORMATION MANAGEMENT | Facility: OTHER | Age: 65
End: 2020-12-29

## 2020-12-29 NOTE — TELEPHONE ENCOUNTER
Spoke to patients wife. Patient has enlisted with [facility] [employer] [facility] through [facility].   Does not want to set up est appointment at the moment and will call when he is ready.    Does not want a call for this.  -aep

## 2021-02-17 DIAGNOSIS — E78.5 DYSLIPIDEMIA: ICD-10-CM

## 2021-02-19 RX ORDER — ROSUVASTATIN CALCIUM 10 MG/1
10 TABLET, COATED ORAL
Qty: 30 TABLET | Refills: 0 | Status: SHIPPED | OUTPATIENT
Start: 2021-02-19 | End: 2021-03-01

## 2021-03-01 DIAGNOSIS — E78.5 DYSLIPIDEMIA: ICD-10-CM

## 2021-03-01 RX ORDER — ROSUVASTATIN CALCIUM 10 MG/1
TABLET, COATED ORAL
Qty: 15 TABLET | Refills: 0 | Status: SHIPPED | OUTPATIENT
Start: 2021-03-01 | End: 2021-03-17

## 2021-03-03 DIAGNOSIS — Z23 NEED FOR VACCINATION: ICD-10-CM

## 2021-03-17 DIAGNOSIS — I10 ESSENTIAL HYPERTENSION, BENIGN: ICD-10-CM

## 2021-03-17 DIAGNOSIS — I10 ESSENTIAL HYPERTENSION: ICD-10-CM

## 2021-03-17 DIAGNOSIS — E78.5 DYSLIPIDEMIA: ICD-10-CM

## 2021-03-19 RX ORDER — ROSUVASTATIN CALCIUM 10 MG/1
10 TABLET, COATED ORAL DAILY
Qty: 90 TABLET | Refills: 0 | Status: SHIPPED | OUTPATIENT
Start: 2021-03-19 | End: 2021-07-02

## 2021-04-05 ENCOUNTER — NON-PROVIDER VISIT (OUTPATIENT)
Dept: CARDIOLOGY | Facility: MEDICAL CENTER | Age: 66
End: 2021-04-05
Payer: MEDICARE

## 2021-04-05 VITALS
BODY MASS INDEX: 30.88 KG/M2 | OXYGEN SATURATION: 95 % | WEIGHT: 233 LBS | HEART RATE: 61 BPM | DIASTOLIC BLOOD PRESSURE: 62 MMHG | HEIGHT: 73 IN | SYSTOLIC BLOOD PRESSURE: 138 MMHG

## 2021-04-05 DIAGNOSIS — Z95.0 CARDIAC PACEMAKER IN SITU: ICD-10-CM

## 2021-04-05 DIAGNOSIS — I44.2 AV BLOCK, 3RD DEGREE (HCC): ICD-10-CM

## 2021-04-05 PROCEDURE — 93280 PM DEVICE PROGR EVAL DUAL: CPT | Performed by: NURSE PRACTITIONER

## 2021-04-05 ASSESSMENT — FIBROSIS 4 INDEX: FIB4 SCORE: 1.27

## 2021-04-05 NOTE — PROGRESS NOTES
Device is working normally. 2 brief mode switching episodes (both <10 seconds, <1% of total time).  Normal sensing and capture of RA and RV leads; stable impedances. Battery longevity is 8.4-9.0 years.  No changes are made today.    He does see Dr. Murray next month.    FU in 6 months for next PM check with me.

## 2021-04-08 ENCOUNTER — PATIENT OUTREACH (OUTPATIENT)
Dept: HEALTH INFORMATION MANAGEMENT | Facility: OTHER | Age: 66
End: 2021-04-08

## 2021-04-08 NOTE — PROGRESS NOTES
Outcome: Declined CGA appointment     Please transfer to Patient Outreach Team at [phone] when patient returns call.    Attempt #1

## 2021-04-19 DIAGNOSIS — I10 ESSENTIAL HYPERTENSION: ICD-10-CM

## 2021-04-20 RX ORDER — POTASSIUM CHLORIDE 750 MG/1
TABLET, FILM COATED, EXTENDED RELEASE ORAL
Qty: 180 TABLET | Refills: 0 | Status: SHIPPED | OUTPATIENT
Start: 2021-04-20 | End: 2021-05-26

## 2021-05-21 ENCOUNTER — HOSPITAL ENCOUNTER (OUTPATIENT)
Dept: LAB | Facility: MEDICAL CENTER | Age: 66
End: 2021-05-21
Attending: INTERNAL MEDICINE
Payer: MEDICARE

## 2021-05-21 DIAGNOSIS — E78.5 DYSLIPIDEMIA: ICD-10-CM

## 2021-05-21 DIAGNOSIS — I10 ESSENTIAL HYPERTENSION: ICD-10-CM

## 2021-05-21 LAB
ALBUMIN SERPL BCP-MCNC: 4.2 G/DL (ref 3.2–4.9)
ALBUMIN/GLOB SERPL: 1.5 G/DL
ALP SERPL-CCNC: 100 U/L (ref 30–99)
ALT SERPL-CCNC: 25 U/L (ref 2–50)
ANION GAP SERPL CALC-SCNC: 12 MMOL/L (ref 7–16)
AST SERPL-CCNC: 23 U/L (ref 12–45)
BILIRUB SERPL-MCNC: 0.8 MG/DL (ref 0.1–1.5)
BUN SERPL-MCNC: 19 MG/DL (ref 8–22)
CALCIUM SERPL-MCNC: 9.6 MG/DL (ref 8.4–10.2)
CHLORIDE SERPL-SCNC: 102 MMOL/L (ref 96–112)
CHOLEST SERPL-MCNC: 121 MG/DL (ref 100–199)
CO2 SERPL-SCNC: 26 MMOL/L (ref 20–33)
CREAT SERPL-MCNC: 0.82 MG/DL (ref 0.5–1.4)
FASTING STATUS PATIENT QL REPORTED: NORMAL
GLOBULIN SER CALC-MCNC: 2.8 G/DL (ref 1.9–3.5)
GLUCOSE SERPL-MCNC: 114 MG/DL (ref 65–99)
HDLC SERPL-MCNC: 39 MG/DL
LDLC SERPL CALC-MCNC: 57 MG/DL
POTASSIUM SERPL-SCNC: 4.2 MMOL/L (ref 3.6–5.5)
PROT SERPL-MCNC: 7 G/DL (ref 6–8.2)
SODIUM SERPL-SCNC: 140 MMOL/L (ref 135–145)
TRIGL SERPL-MCNC: 125 MG/DL (ref 0–149)

## 2021-05-21 PROCEDURE — 36415 COLL VENOUS BLD VENIPUNCTURE: CPT

## 2021-05-21 PROCEDURE — 80061 LIPID PANEL: CPT

## 2021-05-21 PROCEDURE — 80053 COMPREHEN METABOLIC PANEL: CPT

## 2021-05-26 ENCOUNTER — OFFICE VISIT (OUTPATIENT)
Dept: CARDIOLOGY | Facility: MEDICAL CENTER | Age: 66
End: 2021-05-26
Payer: MEDICARE

## 2021-05-26 VITALS
WEIGHT: 229 LBS | DIASTOLIC BLOOD PRESSURE: 78 MMHG | BODY MASS INDEX: 30.35 KG/M2 | HEIGHT: 73 IN | SYSTOLIC BLOOD PRESSURE: 144 MMHG | HEART RATE: 64 BPM | RESPIRATION RATE: 14 BRPM | OXYGEN SATURATION: 96 %

## 2021-05-26 DIAGNOSIS — I10 ESSENTIAL HYPERTENSION, BENIGN: ICD-10-CM

## 2021-05-26 DIAGNOSIS — Z95.0 CARDIAC PACEMAKER IN SITU: ICD-10-CM

## 2021-05-26 DIAGNOSIS — Z82.49 FAMILY HISTORY OF PREMATURE CORONARY ARTERY DISEASE: ICD-10-CM

## 2021-05-26 DIAGNOSIS — E78.5 DYSLIPIDEMIA: ICD-10-CM

## 2021-05-26 DIAGNOSIS — I25.10 CORONARY ARTERY CALCIFICATION SEEN ON CAT SCAN: ICD-10-CM

## 2021-05-26 PROCEDURE — 99214 OFFICE O/P EST MOD 30 MIN: CPT | Performed by: INTERNAL MEDICINE

## 2021-05-26 ASSESSMENT — ENCOUNTER SYMPTOMS
COUGH: 0
SHORTNESS OF BREATH: 0
DIZZINESS: 0
MYALGIAS: 0
LOSS OF CONSCIOUSNESS: 0
PALPITATIONS: 0

## 2021-05-26 NOTE — PROGRESS NOTES
"Chief Complaint   Patient presents with   • Hypertension   • Coronary Artery Disease     F/V Dx: Coronary artery calcification seen on CAT scan   • Chest Pain     F/V Dx: Atypical chest pain       Subjective:   Sanya Miranda is a 66 y.o. male who presents today for follow-up evaluation of permanent pacemaker with a history of hypertension and family history of premature heart disease.    Since 1/23/2020 appointment patient has had no cardiac problems or symptoms.  Back swimming on a regular basis in [place].  His wife is \"immune compromised\" and he diligently restricted in his activity exposure over the past year related to COVID-19 risk.    Since 1/2/2019 the patient was hospitalized 5/2019 for chest pain.  MI ruled out.  MPI on 5/23/2018 was normal.  No subsequent cardiac symptoms  Retired this year.    Patient had previously been followed by Dr. Anabel Dubose for hypertension and dyslipidemia.    Past Medical History:   Diagnosis Date   • Atypical chest pain 05/2019    ER visit for chest pain. MPI negative for ischemia or infarct, LVEF 52%.   • AV block, 3rd degree (HCC) 09/2017    Status post PM implantation   • Hyperlipidemia    • Hypertension      Past Surgical History:   Procedure Laterality Date   • PACEMAKER INSERTION Left 09/2017    [employer] MRI 2272 implanted by Dr. Chahal.     Family History   Problem Relation Age of Onset   • Heart Disease Mother         CABG at age late 60   • Hypertension Mother    • Heart Disease Father         CABG at age 83   • Hypertension Father      Social History     Socioeconomic History   • Marital status:      Spouse name: Not on file   • Number of children: Not on file   • Years of education: Not on file   • Highest education level: Not on file   Occupational History   • Not on file   Tobacco Use   • Smoking status: Never Smoker   • Smokeless tobacco: Never Used   Substance and Sexual Activity   • Alcohol use: Yes     Alcohol/week: 3.6 oz     " Types: 4 Glasses of wine, 2 Cans of beer per week     Comment: 3 days a week   • Drug use: No   • Sexual activity: Not on file   Other Topics Concern   • Not on file   Social History Narrative   • Not on file     Social Determinants of Health     Financial Resource Strain:    • Difficulty of Paying Living Expenses:    Food Insecurity:    • Worried About Running Out of Food in the Last Year:    • Ran Out of Food in the Last Year:    Transportation Needs:    • Lack of Transportation (Medical):    • Lack of Transportation (Non-Medical):    Physical Activity:    • Days of Exercise per Week:    • Minutes of Exercise per Session:    Stress:    • Feeling of Stress :    Social Connections:    • Frequency of Communication with Friends and Family:    • Frequency of Social Gatherings with Friends and Family:    • Attends Confucianist Services:    • Active Member of Clubs or Organizations:    • Attends Club or Organization Meetings:    • Marital Status:    Intimate Partner Violence:    • Fear of Current or Ex-Partner:    • Emotionally Abused:    • Physically Abused:    • Sexually Abused:      Allergies   Allergen Reactions   • Sulfa Drugs Hives     Outpatient Encounter Medications as of 5/26/2021   Medication Sig Dispense Refill   • KLOR-CON 10 10 MEQ tablet TAKE 1 TABLET BY MOUTH TWICE A  tablet 0   • rosuvastatin (CRESTOR) 10 MG Tab Take 1 tablet by mouth every day. 90 tablet 0   • Cetirizine HCl (ZYRTEC ALLERGY PO) Take 1 Tab by mouth as needed.     • ramipril (ALTACE) 10 MG capsule Take 10 mg by mouth 2 Times a Day.     • aspirin 81 MG tablet Take 81 mg by mouth every day.       No facility-administered encounter medications on file as of 5/26/2021.     Review of Systems   Respiratory: Negative for cough and shortness of breath.    Cardiovascular: Negative for chest pain and palpitations.   Musculoskeletal: Negative for myalgias.   Neurological: Negative for dizziness and loss of consciousness.        Objective:   BP  "144/78 (BP Location: Left arm, Patient Position: Sitting, BP Cuff Size: Adult)   Pulse 64   Resp 14   Ht 1.854 m (6' 1\")   Wt 104 kg (229 lb)   SpO2 96%   BMI 30.21 kg/m²     Physical Exam   Constitutional: He is oriented to person, place, and time. He appears well-developed.   HENT:   Head: Normocephalic and atraumatic.   Neck: No JVD present. No thyromegaly present.   Cardiovascular: Normal rate, regular rhythm and normal heart sounds. Exam reveals no gallop and no friction rub.   No murmur heard.  Pulmonary/Chest: Effort normal and breath sounds normal. No respiratory distress. He has no wheezes. He has no rales.   Abdominal: Soft. Bowel sounds are normal. He exhibits no mass. There is no abdominal tenderness.   Musculoskeletal:         General: No deformity. Normal range of motion.      Cervical back: Neck supple.   Lymphadenopathy:     He has no cervical adenopathy.   Neurological: He is alert and oriented to person, place, and time. No cranial nerve deficit. He exhibits normal muscle tone.   Skin: Skin is warm and dry.   Psychiatric: His behavior is normal.     Event monitor: 1/1/16   No significant arrhythmias pauses noted underlying rhythm was sinus.     Stress echo: 12/17/15  Negative stress echocardiogram.  No evidence of ischemia or infarct.  Normal resting left ventricular systolic function with ejection   fraction of 60%.  Fair exercise tolerance.  Resting EKG shows normal sinus rhythm with incomplete right bundle   branch block.  Non-specific ST changes with stress.  No arrhythmias noted     TTE: 12/10/15  No prior study is available for comparison.   Left ventricular ejection fraction is visually estimated to be 65%.   Normal regional wall motion. Grade I diastolic dysfunction.  No significant valve abnormalities.   Unable to estimate pulmonary artery pressure due to an inadequate   tricuspid regurgitant jet.   Ascending aorta diameter is 3.7 cm    Transthoracic echo: 3/24/17  Normal left " ventricular chamber size. Mild left ventricular septal hypertrophy. Left ventricular ejection fraction is visually estimated to be 55%.  Grade I diastolic dysfunction.  Normal right ventricular size and systolic function.  Prolapse of the posterior mitral leaflet was present.  Trace tricuspid regurgitation.  Normal pericardium without effusion.  Dilated ascending aorta at 3.9 cm    EKG: 10/1/15  Sinus rhythm, right bundle branch block    MPI 05/23/2019   Normal myocardial perfusion with no ischemia.   Normal left ventricular wall motion.  LV ejection fraction = 52%. TID absent.    Assessment:     1. Coronary artery calcification seen on CAT scan     2. Cardiac pacemaker in situ     3. Essential hypertension, benign     4. Dyslipidemia     5. Family history of premature coronary artery disease         Medical Decision Making:  Today's Assessment / Status / Plan:     1.  CAD manifested by coronary calcification.  2.  Dyslipidemia.  3.  Permanent pacemaker.  4.  Family history of premature heart disease.  5.  Hypertension.    Recommendation Discussion  1.  The patient is stable from a cardiac standpoint concerning his CAD, dyslipidemia, pacemaker function.  2.  Reviewed recent lipid panel LDL at goal.  3.  BP slightly elevated today, instructed patient to monitor BP, keep a diary, BP goal 120s/70s and this can be addressed on follow-up visits with PCP and/or pacemaker visits.  4.  RTC 1 year.

## 2021-06-15 ENCOUNTER — PATIENT MESSAGE (OUTPATIENT)
Dept: CARDIOLOGY | Facility: MEDICAL CENTER | Age: 66
End: 2021-06-15

## 2021-06-17 NOTE — TELEPHONE ENCOUNTER
Called [employer] technical support to confirm that it is safe to use an induction plate, they stated that it is completely safe as long as the induction plate stays a minimum of 6 inches from device.    Called pt back to confirm safety of induction plate usage and device, reminded pt to keep it more than 6 inches from device when in use. Pt was appreciative of phone call.

## 2021-07-02 DIAGNOSIS — E78.5 DYSLIPIDEMIA: ICD-10-CM

## 2021-07-02 RX ORDER — ROSUVASTATIN CALCIUM 10 MG/1
10 TABLET, COATED ORAL DAILY
Qty: 100 TABLET | Refills: 3 | Status: SHIPPED | OUTPATIENT
Start: 2021-07-02 | End: 2022-08-16

## 2021-09-27 ENCOUNTER — NON-PROVIDER VISIT (OUTPATIENT)
Dept: CARDIOLOGY | Facility: MEDICAL CENTER | Age: 66
End: 2021-09-27
Payer: MEDICARE

## 2021-09-27 VITALS
RESPIRATION RATE: 14 BRPM | SYSTOLIC BLOOD PRESSURE: 118 MMHG | HEIGHT: 73 IN | HEART RATE: 64 BPM | BODY MASS INDEX: 30.22 KG/M2 | DIASTOLIC BLOOD PRESSURE: 62 MMHG | WEIGHT: 228 LBS | OXYGEN SATURATION: 97 %

## 2021-09-27 DIAGNOSIS — I44.2 AV BLOCK, 3RD DEGREE (HCC): ICD-10-CM

## 2021-09-27 DIAGNOSIS — Z95.0 CARDIAC PACEMAKER IN SITU: ICD-10-CM

## 2021-09-27 PROCEDURE — 93280 PM DEVICE PROGR EVAL DUAL: CPT | Performed by: NURSE PRACTITIONER

## 2021-09-27 NOTE — PROGRESS NOTES
Device is working normally. 3 brief mode switching episodes (<1% of total time).  Normal sensing and capture of RA and RV leads; stable impedances. Battery longevity is 8.4 years.  No changes are made today.    He is due for FU with Dr. Murray in May 2022.    FU in 6 months for next PM check with me.

## 2021-10-14 ENCOUNTER — APPOINTMENT (RX ONLY)
Dept: URBAN - METROPOLITAN AREA CLINIC 35 | Facility: CLINIC | Age: 66
Setting detail: DERMATOLOGY
End: 2021-10-14

## 2021-10-14 DIAGNOSIS — Z71.89 OTHER SPECIFIED COUNSELING: ICD-10-CM

## 2021-10-14 DIAGNOSIS — D22 MELANOCYTIC NEVI: ICD-10-CM

## 2021-10-14 DIAGNOSIS — L82.1 OTHER SEBORRHEIC KERATOSIS: ICD-10-CM

## 2021-10-14 DIAGNOSIS — L81.4 OTHER MELANIN HYPERPIGMENTATION: ICD-10-CM

## 2021-10-14 PROBLEM — D22.61 MELANOCYTIC NEVI OF RIGHT UPPER LIMB, INCLUDING SHOULDER: Status: ACTIVE | Noted: 2021-10-14

## 2021-10-14 PROCEDURE — 99213 OFFICE O/P EST LOW 20 MIN: CPT

## 2021-10-14 PROCEDURE — ? COUNSELING

## 2021-10-14 ASSESSMENT — LOCATION DETAILED DESCRIPTION DERM: LOCATION DETAILED: RIGHT POSTERIOR SHOULDER

## 2021-10-14 ASSESSMENT — LOCATION ZONE DERM: LOCATION ZONE: ARM

## 2021-10-14 ASSESSMENT — LOCATION SIMPLE DESCRIPTION DERM: LOCATION SIMPLE: RIGHT SHOULDER

## 2022-01-28 ENCOUNTER — PATIENT MESSAGE (OUTPATIENT)
Dept: HEALTH INFORMATION MANAGEMENT | Facility: OTHER | Age: 67
End: 2022-01-28
Payer: MEDICARE

## 2022-02-15 ENCOUNTER — TELEPHONE (OUTPATIENT)
Dept: CARDIOLOGY | Facility: MEDICAL CENTER | Age: 67
End: 2022-02-15
Payer: MEDICARE

## 2022-02-16 ENCOUNTER — PATIENT MESSAGE (OUTPATIENT)
Dept: CARDIOLOGY | Facility: MEDICAL CENTER | Age: 67
End: 2022-02-16
Payer: MEDICARE

## 2022-02-16 RX ORDER — RAMIPRIL 10 MG/1
10 CAPSULE ORAL 2 TIMES DAILY
Qty: 180 CAPSULE | Refills: 3 | Status: SHIPPED | OUTPATIENT
Start: 2022-02-16 | End: 2022-12-22

## 2022-02-16 NOTE — TELEPHONE ENCOUNTER
LENORA Jurado,    This patient called and stated the last time he spoke to LENORA that he would fill the script for ramipril (ALTACE) 10 MG capsule. He stated he's completely out and would like it sent over to:    [id number] [employer] [facility] [place]ETH RACHEL - [address]   [address] [place] [address]   Phone:  [phone]  Fax:  [phone]      Thank you,    DEANA

## 2022-03-28 ENCOUNTER — NON-PROVIDER VISIT (OUTPATIENT)
Dept: CARDIOLOGY | Facility: MEDICAL CENTER | Age: 67
End: 2022-03-28
Payer: MEDICARE

## 2022-03-28 VITALS
RESPIRATION RATE: 20 BRPM | WEIGHT: 228 LBS | HEIGHT: 73 IN | HEART RATE: 60 BPM | OXYGEN SATURATION: 98 % | BODY MASS INDEX: 30.22 KG/M2 | SYSTOLIC BLOOD PRESSURE: 130 MMHG | DIASTOLIC BLOOD PRESSURE: 72 MMHG

## 2022-03-28 DIAGNOSIS — Z95.0 CARDIAC PACEMAKER IN SITU: ICD-10-CM

## 2022-03-28 DIAGNOSIS — I44.2 AV BLOCK, 3RD DEGREE (HCC): ICD-10-CM

## 2022-03-28 PROCEDURE — 93280 PM DEVICE PROGR EVAL DUAL: CPT | Performed by: NURSE PRACTITIONER

## 2022-03-28 NOTE — PROGRESS NOTES
Device is working normally. 1 mode switching episode lasting 4 seconds (<1% of total time).  Normal sensing and capture of RA and RV leads; stable impedances. Battery longevity is 8.1 years.  No changes are made today.    He does have follow-up with Dr. Murray in May 2022.    Follow-up in 6 months for next PM check with me.

## 2022-05-25 ENCOUNTER — OFFICE VISIT (OUTPATIENT)
Dept: CARDIOLOGY | Facility: MEDICAL CENTER | Age: 67
End: 2022-05-25
Payer: MEDICARE

## 2022-05-25 VITALS
WEIGHT: 225.4 LBS | HEIGHT: 73 IN | HEART RATE: 71 BPM | OXYGEN SATURATION: 96 % | DIASTOLIC BLOOD PRESSURE: 72 MMHG | BODY MASS INDEX: 29.87 KG/M2 | SYSTOLIC BLOOD PRESSURE: 120 MMHG | RESPIRATION RATE: 16 BRPM

## 2022-05-25 DIAGNOSIS — I25.10 CORONARY ARTERY CALCIFICATION SEEN ON CAT SCAN: ICD-10-CM

## 2022-05-25 DIAGNOSIS — I10 ESSENTIAL HYPERTENSION, BENIGN: ICD-10-CM

## 2022-05-25 DIAGNOSIS — E78.5 DYSLIPIDEMIA: ICD-10-CM

## 2022-05-25 DIAGNOSIS — Z95.0 CARDIAC PACEMAKER IN SITU: ICD-10-CM

## 2022-05-25 PROCEDURE — 99214 OFFICE O/P EST MOD 30 MIN: CPT | Performed by: INTERNAL MEDICINE

## 2022-05-25 ASSESSMENT — ENCOUNTER SYMPTOMS
SHORTNESS OF BREATH: 0
DIZZINESS: 0
MYALGIAS: 0
PALPITATIONS: 0
LOSS OF CONSCIOUSNESS: 0
COUGH: 0

## 2022-05-25 NOTE — PROGRESS NOTES
Chief Complaint   Patient presents with   • Chest Pain     F/V Dx: Atypical chest pain     • Dyslipidemia   • Hypertension     F/V Dx: Essential hypertension, benign       Subjective:   Sanya Miranda is a 67 y.o. male who presents today for follow-up evaluation of coronary calcification, hypertension, dyslipidemia, family history of premature heart disease and PPM 2017 for complete heart block..    Since 5/26/2021 appointment the patient has had no cardiac symptoms including chest pain, palpitations, shortness of breath.  Working on an outdoor backyard home project.    Since 1/2/2019 the patient was hospitalized 5/2019 for chest pain.  MI ruled out.  MPI on 5/23/2018 was normal.  No subsequent cardiac symptoms  Retired this year.    The patient had previously been followed by Dr. Anabel Espinal for hypertension and dyslipidemia.    Past Medical History:   Diagnosis Date   • Atypical chest pain 05/2019    ER visit for chest pain. MPI negative for ischemia or infarct, LVEF 52%.   • AV block, 3rd degree (HCC) 09/2017    Status post PM implantation   • Hyperlipidemia    • Hypertension      Past Surgical History:   Procedure Laterality Date   • PACEMAKER INSERTION Left 09/2017    [employer] MRI 2272 implanted by Dr. Chahal.     Family History   Problem Relation Age of Onset   • Heart Disease Mother         CABG at age late 60   • Hypertension Mother    • Heart Disease Father         CABG at age 83   • Hypertension Father      Social History     Socioeconomic History   • Marital status:      Spouse name: Not on file   • Number of children: Not on file   • Years of education: Not on file   • Highest education level: Not on file   Occupational History   • Not on file   Tobacco Use   • Smoking status: Never Smoker   • Smokeless tobacco: Never Used   Vaping Use   • Vaping Use: Not on file   Substance and Sexual Activity   • Alcohol use: Yes     Alcohol/week: 4.2 oz     Types: 2 Cans of beer, 5 Glasses of  "wine per week     Comment: 5 days a week   • Drug use: No   • Sexual activity: Not on file   Other Topics Concern   • Not on file   Social History Narrative   • Not on file     Social Determinants of Health     Financial Resource Strain: Not on file   Food Insecurity: Not on file   Transportation Needs: Not on file   Physical Activity: Not on file   Stress: Not on file   Social Connections: Not on file   Intimate Partner Violence: Not on file   Housing Stability: Not on file     Allergies   Allergen Reactions   • Sulfa Drugs Hives     Outpatient Encounter Medications as of 5/25/2022   Medication Sig Dispense Refill   • ramipril (ALTACE) 10 MG capsule Take 1 Capsule by mouth 2 times a day. 180 Capsule 3   • rosuvastatin (CRESTOR) 10 MG Tab TAKE 1 TABLET BY MOUTH EVERY  tablet 3   • Cetirizine HCl (ZYRTEC ALLERGY PO) Take 1 Tab by mouth as needed.     • aspirin 81 MG tablet Take 81 mg by mouth every day.       No facility-administered encounter medications on file as of 5/25/2022.     Review of Systems   Respiratory: Negative for cough and shortness of breath.    Cardiovascular: Negative for chest pain and palpitations.   Musculoskeletal: Negative for myalgias.   Neurological: Negative for dizziness and loss of consciousness.        Objective:   /72 (BP Location: Left arm, Patient Position: Sitting, BP Cuff Size: Adult)   Pulse 71   Resp 16   Ht 1.854 m (6' 1\")   Wt 102 kg (225 lb 6.4 oz)   SpO2 96%   BMI 29.74 kg/m²     Physical Exam  Constitutional:       Appearance: He is well-developed.   Eyes:      Conjunctiva/sclera: Conjunctivae normal.      Pupils: Pupils are equal, round, and reactive to light.   Neck:      Vascular: No JVD.   Cardiovascular:      Rate and Rhythm: Normal rate and regular rhythm.      Heart sounds: Normal heart sounds.      Comments: PPM generator  Pulmonary:      Effort: Pulmonary effort is normal. No accessory muscle usage or respiratory distress.      Breath sounds: " Normal breath sounds. No wheezing or rales.   Musculoskeletal:      Cervical back: Normal range of motion and neck supple.   Skin:     General: Skin is warm and dry.      Findings: No rash.      Nails: There is no clubbing.   Neurological:      Mental Status: He is alert and oriented to person, place, and time.   Psychiatric:         Behavior: Behavior normal.       Event monitor: 1/1/16   No significant arrhythmias pauses noted underlying rhythm was sinus.     Stress echo: 12/17/15  Negative stress echocardiogram.  No evidence of ischemia or infarct.  Normal resting left ventricular systolic function with ejection   fraction of 60%.  Fair exercise tolerance.  Resting EKG shows normal sinus rhythm with incomplete right bundle   branch block.  Non-specific ST changes with stress.  No arrhythmias noted     TTE: 12/10/15  No prior study is available for comparison.   Left ventricular ejection fraction is visually estimated to be 65%.   Normal regional wall motion. Grade I diastolic dysfunction.  No significant valve abnormalities.   Unable to estimate pulmonary artery pressure due to an inadequate   tricuspid regurgitant jet.   Ascending aorta diameter is 3.7 cm    Transthoracic echo: 3/24/17  Normal left ventricular chamber size. Mild left ventricular septal hypertrophy. Left ventricular ejection fraction is visually estimated to be 55%.  Grade I diastolic dysfunction.  Normal right ventricular size and systolic function.  Prolapse of the posterior mitral leaflet was present.  Trace tricuspid regurgitation.  Normal pericardium without effusion.  Dilated ascending aorta at 3.9 cm    EKG: 10/1/15  Sinus rhythm, right bundle branch block    MPI 05/23/2019   Normal myocardial perfusion with no ischemia.   Normal left ventricular wall motion.  LV ejection fraction = 52%. TID absent.    Assessment:     1. Coronary artery calcification seen on CAT scan     2. Cardiac pacemaker in situ     3. Essential hypertension, benign      4. Dyslipidemia  LIPID PANEL    Comp Metabolic Panel       Medical Decision Making:  Today's Assessment / Status / Plan:     1.  CAD manifested by coronary calcification.  2.  Dyslipidemia.  3.  Permanent pacemaker, 9/6/2017 for CHB  4.  Family history of premature heart disease.  5.  Hypertension.    Recommendation Discussion  1.  Coronary calcification: Asymptomatic, continue aspirin, rosuvastatin.  2.  Dyslipidemia: LDL 57, at goal, continue rosuvastatin, recheck lipid panel.  3.  Hypertension: BP normal, at goal, continue ramipril.  4.  PPM: Reviewed most recent interrogation which shows normal function, A paced 50%, V paced 99%.  5.  RTC 1 year.

## 2022-06-02 ENCOUNTER — NON-PROVIDER VISIT (OUTPATIENT)
Dept: CARDIOLOGY | Facility: MEDICAL CENTER | Age: 67
End: 2022-06-02
Payer: MEDICARE

## 2022-06-02 PROCEDURE — 93294 REM INTERROG EVL PM/LDLS PM: CPT | Performed by: INTERNAL MEDICINE

## 2022-06-10 NOTE — CARDIAC REMOTE MONITOR - SCAN
Device transmission reviewed. Device demonstrated appropriate function.       Electronically Signed by: Nani Chahal M.D.    6/13/2022  9:38 AM

## 2022-06-15 ENCOUNTER — HOSPITAL ENCOUNTER (OUTPATIENT)
Dept: LAB | Facility: MEDICAL CENTER | Age: 67
End: 2022-06-15
Attending: INTERNAL MEDICINE
Payer: MEDICARE

## 2022-06-15 DIAGNOSIS — E78.5 DYSLIPIDEMIA: ICD-10-CM

## 2022-06-15 LAB
ALBUMIN SERPL BCP-MCNC: 4.5 G/DL (ref 3.2–4.9)
ALBUMIN/GLOB SERPL: 1.9 G/DL
ALP SERPL-CCNC: 91 U/L (ref 30–99)
ALT SERPL-CCNC: 21 U/L (ref 2–50)
ANION GAP SERPL CALC-SCNC: 11 MMOL/L (ref 7–16)
AST SERPL-CCNC: 23 U/L (ref 12–45)
BILIRUB SERPL-MCNC: 0.9 MG/DL (ref 0.1–1.5)
BUN SERPL-MCNC: 18 MG/DL (ref 8–22)
CALCIUM SERPL-MCNC: 9.3 MG/DL (ref 8.5–10.5)
CHLORIDE SERPL-SCNC: 104 MMOL/L (ref 96–112)
CHOLEST SERPL-MCNC: 107 MG/DL (ref 100–199)
CO2 SERPL-SCNC: 24 MMOL/L (ref 20–33)
CREAT SERPL-MCNC: 0.85 MG/DL (ref 0.5–1.4)
FASTING STATUS PATIENT QL REPORTED: NORMAL
GFR SERPLBLD CREATININE-BSD FMLA CKD-EPI: 95 ML/MIN/1.73 M 2
GLOBULIN SER CALC-MCNC: 2.4 G/DL (ref 1.9–3.5)
GLUCOSE SERPL-MCNC: 108 MG/DL (ref 65–99)
HDLC SERPL-MCNC: 41 MG/DL
LDLC SERPL CALC-MCNC: 54 MG/DL
POTASSIUM SERPL-SCNC: 4 MMOL/L (ref 3.6–5.5)
PROT SERPL-MCNC: 6.9 G/DL (ref 6–8.2)
SODIUM SERPL-SCNC: 139 MMOL/L (ref 135–145)
TRIGL SERPL-MCNC: 62 MG/DL (ref 0–149)

## 2022-06-15 PROCEDURE — 80053 COMPREHEN METABOLIC PANEL: CPT

## 2022-06-15 PROCEDURE — 80061 LIPID PANEL: CPT

## 2022-06-15 PROCEDURE — 36415 COLL VENOUS BLD VENIPUNCTURE: CPT

## 2022-06-15 NOTE — LETTER
June 16, 2022        Sanya Miranda  [address] [place] [address]          Dear Sanya,    We have received the results of your recent:    Lab work    Your test came back stable.  Please follow up as previously discussed with your physician.      Feel free to call us with any questions.        Sincerely,          Toma, Medical Assistant for .

## 2022-07-18 ENCOUNTER — TELEPHONE (OUTPATIENT)
Dept: HEALTH INFORMATION MANAGEMENT | Facility: OTHER | Age: 67
End: 2022-07-18

## 2022-08-14 DIAGNOSIS — E78.5 DYSLIPIDEMIA: ICD-10-CM

## 2022-08-15 NOTE — TELEPHONE ENCOUNTER
Is the patient due for a refill? Yes    Was the patient seen the past year? Yes    Date of last office visit: 5/25/2022    Does the patient have an upcoming appointment?  Yes   If yes, When? 9/27/2022    Provider to refill: LENORA     Does the patients insurance require a 100 day supply?  Yes

## 2022-08-16 RX ORDER — ROSUVASTATIN CALCIUM 10 MG/1
10 TABLET, COATED ORAL DAILY
Qty: 100 TABLET | Refills: 2 | Status: SHIPPED | OUTPATIENT
Start: 2022-08-16 | End: 2023-05-10

## 2022-09-01 ENCOUNTER — NON-PROVIDER VISIT (OUTPATIENT)
Dept: CARDIOLOGY | Facility: MEDICAL CENTER | Age: 67
End: 2022-09-01
Payer: MEDICARE

## 2022-09-01 PROCEDURE — 93294 REM INTERROG EVL PM/LDLS PM: CPT | Performed by: INTERNAL MEDICINE

## 2022-09-01 NOTE — CARDIAC REMOTE MONITOR - SCAN
Device transmission reviewed. Device demonstrated appropriate function.       Electronically Signed by: Erik Chatterjee M.D.    9/2/2022  4:19 PM

## 2022-09-27 ENCOUNTER — NON-PROVIDER VISIT (OUTPATIENT)
Dept: CARDIOLOGY | Facility: MEDICAL CENTER | Age: 67
End: 2022-09-27
Payer: MEDICARE

## 2022-09-27 VITALS
HEIGHT: 73 IN | OXYGEN SATURATION: 96 % | HEART RATE: 60 BPM | WEIGHT: 226 LBS | BODY MASS INDEX: 29.95 KG/M2 | DIASTOLIC BLOOD PRESSURE: 62 MMHG | SYSTOLIC BLOOD PRESSURE: 116 MMHG | RESPIRATION RATE: 13 BRPM

## 2022-09-27 DIAGNOSIS — I44.2 AV BLOCK, 3RD DEGREE (HCC): ICD-10-CM

## 2022-09-27 DIAGNOSIS — Z95.0 CARDIAC PACEMAKER IN SITU: ICD-10-CM

## 2022-09-27 PROCEDURE — 93280 PM DEVICE PROGR EVAL DUAL: CPT | Performed by: NURSE PRACTITIONER

## 2022-09-27 NOTE — PROGRESS NOTES
Device is working normally. No mode switching episodes.  Normal sensing of RA lead; unable to measure R waves. Stable capture of RA and RV leads; stable impedances. Battery longevity is 3.9 years.  No changes are made today.    He will be due to see Dr. Murray in May 2023 for annual follow-up.    Follow-up in 6 months for next PM check with me.

## 2022-10-13 ENCOUNTER — APPOINTMENT (RX ONLY)
Dept: URBAN - METROPOLITAN AREA CLINIC 35 | Facility: CLINIC | Age: 67
Setting detail: DERMATOLOGY
End: 2022-10-13

## 2022-10-13 DIAGNOSIS — D22 MELANOCYTIC NEVI: ICD-10-CM

## 2022-10-13 DIAGNOSIS — L81.4 OTHER MELANIN HYPERPIGMENTATION: ICD-10-CM

## 2022-10-13 DIAGNOSIS — Z71.89 OTHER SPECIFIED COUNSELING: ICD-10-CM

## 2022-10-13 DIAGNOSIS — D18.0 HEMANGIOMA: ICD-10-CM

## 2022-10-13 DIAGNOSIS — L82.1 OTHER SEBORRHEIC KERATOSIS: ICD-10-CM

## 2022-10-13 PROBLEM — D22.61 MELANOCYTIC NEVI OF RIGHT UPPER LIMB, INCLUDING SHOULDER: Status: ACTIVE | Noted: 2022-10-13

## 2022-10-13 PROBLEM — D18.01 HEMANGIOMA OF SKIN AND SUBCUTANEOUS TISSUE: Status: ACTIVE | Noted: 2022-10-13

## 2022-10-13 PROCEDURE — ? COUNSELING

## 2022-10-13 PROCEDURE — 99213 OFFICE O/P EST LOW 20 MIN: CPT

## 2022-10-13 ASSESSMENT — LOCATION ZONE DERM
LOCATION ZONE: ARM
LOCATION ZONE: TRUNK

## 2022-10-13 ASSESSMENT — LOCATION SIMPLE DESCRIPTION DERM
LOCATION SIMPLE: RIGHT SHOULDER
LOCATION SIMPLE: GROIN

## 2022-10-13 ASSESSMENT — LOCATION DETAILED DESCRIPTION DERM
LOCATION DETAILED: SUPRAPUBIC SKIN
LOCATION DETAILED: RIGHT POSTERIOR SHOULDER

## 2022-11-29 ENCOUNTER — PATIENT MESSAGE (OUTPATIENT)
Dept: CARDIOLOGY | Facility: MEDICAL CENTER | Age: 67
End: 2022-11-29
Payer: MEDICARE

## 2022-12-01 ENCOUNTER — NON-PROVIDER VISIT (OUTPATIENT)
Dept: CARDIOLOGY | Facility: MEDICAL CENTER | Age: 67
End: 2022-12-01
Payer: MEDICARE

## 2022-12-01 ENCOUNTER — TELEPHONE (OUTPATIENT)
Dept: CARDIOLOGY | Facility: MEDICAL CENTER | Age: 67
End: 2022-12-01

## 2022-12-01 ENCOUNTER — HOSPITAL ENCOUNTER (OUTPATIENT)
Dept: LAB | Facility: MEDICAL CENTER | Age: 67
End: 2022-12-01
Attending: INTERNAL MEDICINE
Payer: MEDICARE

## 2022-12-01 LAB
CHOLEST SERPL-MCNC: 122 MG/DL (ref 100–199)
FASTING STATUS PATIENT QL REPORTED: NORMAL
HDLC SERPL-MCNC: 41 MG/DL
LDLC SERPL CALC-MCNC: 57 MG/DL
TRIGL SERPL-MCNC: 119 MG/DL (ref 0–149)

## 2022-12-01 PROCEDURE — 80061 LIPID PANEL: CPT

## 2022-12-01 PROCEDURE — 36415 COLL VENOUS BLD VENIPUNCTURE: CPT

## 2022-12-01 PROCEDURE — 93294 REM INTERROG EVL PM/LDLS PM: CPT | Performed by: INTERNAL MEDICINE

## 2022-12-01 NOTE — CARDIAC REMOTE MONITOR - SCAN
Device transmission reviewed. Device demonstrated appropriate function.       Electronically Signed by: Nani Chahal M.D.    12/1/2022  9:10 AM

## 2022-12-22 RX ORDER — RAMIPRIL 10 MG/1
CAPSULE ORAL
Qty: 200 CAPSULE | Refills: 3 | Status: SHIPPED | OUTPATIENT
Start: 2022-12-22 | End: 2024-03-05

## 2022-12-22 NOTE — TELEPHONE ENCOUNTER
Is the patient due for a refill? No    Was the patient seen the past year? Yes    Date of last office visit: 5/25/2022    Does the patient have an upcoming appointment?  Yes   If yes, When? 2/9/2023    Provider to refill:LENORA    Does the patients insurance require a 100 day supply?  Yes

## 2023-02-09 ENCOUNTER — OFFICE VISIT (OUTPATIENT)
Dept: CARDIOLOGY | Facility: MEDICAL CENTER | Age: 68
End: 2023-02-09
Payer: MEDICARE

## 2023-02-09 VITALS
SYSTOLIC BLOOD PRESSURE: 140 MMHG | OXYGEN SATURATION: 95 % | DIASTOLIC BLOOD PRESSURE: 84 MMHG | BODY MASS INDEX: 30.3 KG/M2 | HEART RATE: 70 BPM | HEIGHT: 73 IN | RESPIRATION RATE: 12 BRPM | WEIGHT: 228.6 LBS

## 2023-02-09 DIAGNOSIS — Z82.49 FAMILY HISTORY OF PREMATURE CORONARY ARTERY DISEASE: ICD-10-CM

## 2023-02-09 DIAGNOSIS — I25.10 CORONARY ARTERY CALCIFICATION SEEN ON CAT SCAN: ICD-10-CM

## 2023-02-09 DIAGNOSIS — I10 ESSENTIAL HYPERTENSION, BENIGN: ICD-10-CM

## 2023-02-09 DIAGNOSIS — Z95.0 CARDIAC PACEMAKER IN SITU: ICD-10-CM

## 2023-02-09 DIAGNOSIS — E78.5 DYSLIPIDEMIA: ICD-10-CM

## 2023-02-09 PROCEDURE — 99214 OFFICE O/P EST MOD 30 MIN: CPT | Performed by: INTERNAL MEDICINE

## 2023-02-09 ASSESSMENT — ENCOUNTER SYMPTOMS
PALPITATIONS: 0
DIZZINESS: 0
MYALGIAS: 0
COUGH: 0
SHORTNESS OF BREATH: 0
LOSS OF CONSCIOUSNESS: 0

## 2023-02-09 NOTE — PROGRESS NOTES
Past Medical History:   Diagnosis Date    Atypical chest pain 05/2019    ER visit for chest pain. MPI negative for ischemia or infarct, LVEF 52%.    AV block, 3rd degree (HCC) 09/2017    Status post PM implantation    Hyperlipidemia     Hypertension      Past Surgical History:   Procedure Laterality Date    PACEMAKER INSERTION Left 09/2017    [employer] MRI 2272 implanted by Dr. Chahal.     Family History   Problem Relation Age of Onset    Heart Disease Mother         CABG at age late 60    Hypertension Mother     Heart Disease Father         CABG at age 83    Hypertension Father      Social History     Socioeconomic History    Marital status:      Spouse name: Not on file    Number of children: Not on file    Years of education: Not on file    Highest education level: Not on file   Occupational History    Not on file   Tobacco Use    Smoking status: Never    Smokeless tobacco: Never   Vaping Use    Vaping Use: Not on file   Substance and Sexual Activity    Alcohol use: Yes     Alcohol/week: 4.2 oz     Types: 2 Cans of beer, 5 Glasses of wine per week     Comment: 5 days a week    Drug use: No    Sexual activity: Not on file   Other Topics Concern    Not on file   Social History Narrative    Not on file     Social Determinants of Health     Financial Resource Strain: Not on file   Food Insecurity: Not on file   Transportation Needs: Not on file   Physical Activity: Not on file   Stress: Not on file   Social Connections: Not on file   Intimate Partner Violence: Not on file   Housing Stability: Not on file     Allergies   Allergen Reactions    Sulfa Drugs Hives     Outpatient Encounter Medications as of 2/9/2023   Medication Sig Dispense Refill    ramipril (ALTACE) 10 MG capsule TAKE 1 CAPSULE BY MOUTH TWICE A  Capsule 3    rosuvastatin (CRESTOR) 10 MG Tab TAKE 1 TABLET BY MOUTH EVERY  Tablet 2    Cetirizine HCl (ZYRTEC ALLERGY PO) Take 1 Tab by mouth as needed.      aspirin 81 MG  "tablet Take 81 mg by mouth every day.       No facility-administered encounter medications on file as of 2/9/2023.     Review of Systems   Respiratory:  Negative for cough and shortness of breath.    Cardiovascular:  Negative for chest pain and palpitations.   Musculoskeletal:  Negative for myalgias.   Neurological:  Negative for dizziness and loss of consciousness.      Objective:   BP (!) 140/84 (BP Location: Left arm, Patient Position: Sitting, BP Cuff Size: Adult)   Pulse 70   Resp 12   Ht 1.854 m (6' 1\")   Wt 104 kg (228 lb 9.6 oz)   SpO2 95%   BMI 30.16 kg/m²     Physical Exam  Constitutional:       Appearance: He is well-developed.   Eyes:      Conjunctiva/sclera: Conjunctivae normal.      Pupils: Pupils are equal, round, and reactive to light.   Neck:      Vascular: No JVD.   Cardiovascular:      Rate and Rhythm: Normal rate and regular rhythm.      Heart sounds: Normal heart sounds.      Comments: PPM generator  Pulmonary:      Effort: Pulmonary effort is normal. No accessory muscle usage or respiratory distress.      Breath sounds: Normal breath sounds. No wheezing or rales.   Musculoskeletal:      Cervical back: Normal range of motion and neck supple.   Skin:     General: Skin is warm and dry.      Findings: No rash.      Nails: There is no clubbing.   Neurological:      Mental Status: He is alert and oriented to person, place, and time.   Psychiatric:         Behavior: Behavior normal.       Assessment:     1. Cardiac pacemaker in situ        2. Coronary artery calcification seen on CAT scan        3. Essential hypertension, benign        4. Dyslipidemia        5. Family history of premature coronary artery disease            Medical Decision Making:  Today's Assessment / Status / Plan:       "

## 2023-02-09 NOTE — PROGRESS NOTES
Chief Complaint   Patient presents with    Other     F/v Dx: Coronary artery calcification seen on CAT scan    Chest Pain     F/v Dx:Atypical chest pain    Hypertension       Subjective     Sanya Miranda is a 67 y.o. male who presents today for follow-up cardiac care.    The patient has coronary calcification, hypertension, dyslipidemia, family history of premature heart disease and PPM 2017 for complete heart block.    Since 5/25/2022 appointment the patient has had no cardiac symptoms including chest pain, palpitations, shortness of breath.  Past Medical History:   Diagnosis Date    Atypical chest pain 05/2019    ER visit for chest pain. MPI negative for ischemia or infarct, LVEF 52%.    AV block, 3rd degree (HCC) 09/2017    Status post PM implantation    Hyperlipidemia     Hypertension      Past Surgical History:   Procedure Laterality Date    PACEMAKER INSERTION Left 09/2017    [employer] MRI 2272 implanted by Dr. Chahal.     Family History   Problem Relation Age of Onset    Heart Disease Mother         CABG at age late 60    Hypertension Mother     Heart Disease Father         CABG at age 83    Hypertension Father      Social History     Socioeconomic History    Marital status:      Spouse name: Not on file    Number of children: Not on file    Years of education: Not on file    Highest education level: Not on file   Occupational History    Not on file   Tobacco Use    Smoking status: Never    Smokeless tobacco: Never   Vaping Use    Vaping Use: Not on file   Substance and Sexual Activity    Alcohol use: Yes     Alcohol/week: 4.2 oz     Types: 2 Cans of beer, 5 Glasses of wine per week     Comment: 5 days a week    Drug use: No    Sexual activity: Not on file   Other Topics Concern    Not on file   Social History Narrative    Not on file     Social Determinants of Health     Financial Resource Strain: Not on file   Food Insecurity: Not on file   Transportation Needs: Not on file  "  Physical Activity: Not on file   Stress: Not on file   Social Connections: Not on file   Intimate Partner Violence: Not on file   Housing Stability: Not on file     Allergies   Allergen Reactions    Sulfa Drugs Hives     Outpatient Encounter Medications as of 2/9/2023   Medication Sig Dispense Refill    ramipril (ALTACE) 10 MG capsule TAKE 1 CAPSULE BY MOUTH TWICE A  Capsule 3    rosuvastatin (CRESTOR) 10 MG Tab TAKE 1 TABLET BY MOUTH EVERY  Tablet 2    Cetirizine HCl (ZYRTEC ALLERGY PO) Take 1 Tab by mouth as needed.      aspirin 81 MG tablet Take 81 mg by mouth every day.       No facility-administered encounter medications on file as of 2/9/2023.     Review of Systems   Respiratory:  Negative for cough and shortness of breath.    Cardiovascular:  Negative for chest pain and palpitations.   Musculoskeletal:  Negative for myalgias.   Neurological:  Negative for dizziness and loss of consciousness.            Objective     BP (!) 140/84 (BP Location: Left arm, Patient Position: Sitting, BP Cuff Size: Adult)   Pulse 70   Resp 12   Ht 1.854 m (6' 1\")   Wt 104 kg (228 lb 9.6 oz)   SpO2 95%   BMI 30.16 kg/m²     Physical Exam  Vitals reviewed.   Constitutional:       General: He is not in acute distress.     Appearance: He is well-developed.   Eyes:      Conjunctiva/sclera: Conjunctivae normal.      Pupils: Pupils are equal, round, and reactive to light.   Neck:      Vascular: No JVD.   Cardiovascular:      Rate and Rhythm: Normal rate and regular rhythm.      Pulses:           Carotid pulses are 2+ on the right side and 2+ on the left side.     Heart sounds: Normal heart sounds. No murmur heard.    No friction rub. No gallop.      Comments: Pacemaker generator in place  Pulmonary:      Effort: Pulmonary effort is normal. No accessory muscle usage or respiratory distress.      Breath sounds: Normal breath sounds. No wheezing or rales.   Abdominal:      General: There is no distension.      " Palpations: Abdomen is soft. There is no mass.      Tenderness: There is no abdominal tenderness.   Musculoskeletal:      Cervical back: Normal range of motion and neck supple.      Right lower leg: No edema.      Left lower leg: No edema.   Skin:     General: Skin is warm and dry.      Findings: No rash.      Nails: There is no clubbing.   Neurological:      Mental Status: He is alert and oriented to person, place, and time.   Psychiatric:         Behavior: Behavior normal.            Event monitor: 1/1/16   No significant arrhythmias pauses noted underlying rhythm was sinus.     Stress echo: 12/17/15  Negative stress echocardiogram.  No evidence of ischemia or infarct.  Normal resting left ventricular systolic function with ejection   fraction of 60%.  Fair exercise tolerance.  Resting EKG shows normal sinus rhythm with incomplete right bundle   branch block.  Non-specific ST changes with stress.  No arrhythmias noted     TTE: 12/10/15  No prior study is available for comparison.   Left ventricular ejection fraction is visually estimated to be 65%.   Normal regional wall motion. Grade I diastolic dysfunction.  No significant valve abnormalities.   Unable to estimate pulmonary artery pressure due to an inadequate   tricuspid regurgitant jet.   Ascending aorta diameter is 3.7 cm    Transthoracic echo: 3/24/17  Normal left ventricular chamber size. Mild left ventricular septal hypertrophy. Left ventricular ejection fraction is visually estimated to be 55%.  Grade I diastolic dysfunction.  Normal right ventricular size and systolic function.  Prolapse of the posterior mitral leaflet was present.  Trace tricuspid regurgitation.  Normal pericardium without effusion.  Dilated ascending aorta at 3.9 cm    EKG: 10/1/15  Sinus rhythm, right bundle branch block    MPI 05/23/2019   Normal myocardial perfusion with no ischemia.   Normal left ventricular wall motion.  LV ejection fraction = 52%. TID absent.  Assessment & Plan      1. Cardiac pacemaker in situ        2. Coronary artery calcification seen on CAT scan        3. Essential hypertension, benign        4. Dyslipidemia        5. Family history of premature coronary artery disease            Medical Decision Making: Today's Assessment/Status/Plan:     1.  CAD manifested by coronary calcification.  2.  Dyslipidemia.  3.  Permanent pacemaker, 9/6/2017 for CHB  4.  Family history of premature heart disease.  5.  Hypertension.    Recommendation Discussion  1.  Coronary calcification: Asymptomatic, continue aspirin, rosuvastatin.  2.  Dyslipidemia: LDL 57, at goal, continue rosuvastatin\.  3.  Hypertension: BP slightly elevated today, generally normal, at goal, continue ramipril, instructed patient to monitor BP, discussed proper technique, contact the office if remains consistently 130s or greater.  4.  PPM: Reviewed most recent interrogation which shows normal function, A paced 50%, V paced 99%, has follow-up interrogation next month.  5.  RTC 1 year with VÍCTOR Holbrook and with me in 2 years.

## 2023-03-02 ENCOUNTER — NON-PROVIDER VISIT (OUTPATIENT)
Dept: CARDIOLOGY | Facility: MEDICAL CENTER | Age: 68
End: 2023-03-02
Payer: MEDICARE

## 2023-03-02 PROCEDURE — 93294 REM INTERROG EVL PM/LDLS PM: CPT | Performed by: INTERNAL MEDICINE

## 2023-03-02 NOTE — CARDIAC REMOTE MONITOR - SCAN
Device transmission reviewed. Device demonstrated appropriate function.       Electronically Signed by: Nani Chahal M.D.    3/10/2023  11:16 AM

## 2023-03-27 ENCOUNTER — APPOINTMENT (OUTPATIENT)
Dept: CARDIOLOGY | Facility: MEDICAL CENTER | Age: 68
End: 2023-03-27
Payer: MEDICARE

## 2023-04-17 ENCOUNTER — NON-PROVIDER VISIT (OUTPATIENT)
Dept: CARDIOLOGY | Facility: MEDICAL CENTER | Age: 68
End: 2023-04-17
Payer: MEDICARE

## 2023-04-17 VITALS
OXYGEN SATURATION: 95 % | HEART RATE: 79 BPM | RESPIRATION RATE: 18 BRPM | BODY MASS INDEX: 30.48 KG/M2 | WEIGHT: 230 LBS | DIASTOLIC BLOOD PRESSURE: 62 MMHG | SYSTOLIC BLOOD PRESSURE: 124 MMHG | HEIGHT: 73 IN

## 2023-04-17 DIAGNOSIS — I44.2 AV BLOCK, 3RD DEGREE (HCC): ICD-10-CM

## 2023-04-17 DIAGNOSIS — Z95.0 CARDIAC PACEMAKER IN SITU: ICD-10-CM

## 2023-04-17 PROCEDURE — 93280 PM DEVICE PROGR EVAL DUAL: CPT | Performed by: NURSE PRACTITIONER

## 2023-04-17 NOTE — PROGRESS NOTES
Patient's wife passed away a couple of months ago, after a long bhatia with colon cancer.    Device is working normally. No mode switching episodes.  Normal sensing of RA lead; unable to measure R waves. Stable capture of RA and RV leads; stable impedances. Battery longevity is 3.4 years.  No changes are made today.    Follow-up in 6 months for next PM check with me.

## 2023-05-09 DIAGNOSIS — E78.5 DYSLIPIDEMIA: ICD-10-CM

## 2023-05-10 RX ORDER — ROSUVASTATIN CALCIUM 10 MG/1
10 TABLET, COATED ORAL DAILY
Qty: 100 TABLET | Refills: 2 | Status: SHIPPED | OUTPATIENT
Start: 2023-05-10 | End: 2024-03-25

## 2023-05-10 NOTE — TELEPHONE ENCOUNTER
Is the patient due for a refill? Yes    Was the patient seen the past year? Yes    Date of last office visit: 02/09/2023    Does the patient have an upcoming appointment?  Yes   If yes, When? 10/17/2023    Provider to refill:LENORA    Does the patients insurance require a 100 day supply?  Yes

## 2023-07-12 ENCOUNTER — HOSPITAL ENCOUNTER (OUTPATIENT)
Dept: LAB | Facility: MEDICAL CENTER | Age: 68
End: 2023-07-12
Attending: FAMILY MEDICINE
Payer: MEDICARE

## 2023-07-12 LAB
ALBUMIN SERPL BCP-MCNC: 4.3 G/DL (ref 3.2–4.9)
ALBUMIN/GLOB SERPL: 1.7 G/DL
ALP SERPL-CCNC: 94 U/L (ref 30–99)
ALT SERPL-CCNC: 25 U/L (ref 2–50)
ANION GAP SERPL CALC-SCNC: 12 MMOL/L (ref 7–16)
AST SERPL-CCNC: 20 U/L (ref 12–45)
BASOPHILS # BLD AUTO: 1.1 % (ref 0–1.8)
BASOPHILS # BLD: 0.07 K/UL (ref 0–0.12)
BILIRUB SERPL-MCNC: 0.7 MG/DL (ref 0.1–1.5)
BUN SERPL-MCNC: 15 MG/DL (ref 8–22)
CALCIUM ALBUM COR SERPL-MCNC: 9 MG/DL (ref 8.5–10.5)
CALCIUM SERPL-MCNC: 9.2 MG/DL (ref 8.5–10.5)
CHLORIDE SERPL-SCNC: 104 MMOL/L (ref 96–112)
CHOLEST SERPL-MCNC: 123 MG/DL (ref 100–199)
CO2 SERPL-SCNC: 25 MMOL/L (ref 20–33)
CREAT SERPL-MCNC: 0.89 MG/DL (ref 0.5–1.4)
EOSINOPHIL # BLD AUTO: 0.07 K/UL (ref 0–0.51)
EOSINOPHIL NFR BLD: 1.1 % (ref 0–6.9)
ERYTHROCYTE [DISTWIDTH] IN BLOOD BY AUTOMATED COUNT: 41.1 FL (ref 35.9–50)
EST. AVERAGE GLUCOSE BLD GHB EST-MCNC: 114 MG/DL
FASTING STATUS PATIENT QL REPORTED: NORMAL
GFR SERPLBLD CREATININE-BSD FMLA CKD-EPI: 93 ML/MIN/1.73 M 2
GLOBULIN SER CALC-MCNC: 2.5 G/DL (ref 1.9–3.5)
GLUCOSE SERPL-MCNC: 110 MG/DL (ref 65–99)
HBA1C MFR BLD: 5.6 % (ref 4–5.6)
HCT VFR BLD AUTO: 51 % (ref 42–52)
HDLC SERPL-MCNC: 43 MG/DL
HGB BLD-MCNC: 17.4 G/DL (ref 14–18)
IMM GRANULOCYTES # BLD AUTO: 0.02 K/UL (ref 0–0.11)
IMM GRANULOCYTES NFR BLD AUTO: 0.3 % (ref 0–0.9)
LDLC SERPL CALC-MCNC: 60 MG/DL
LYMPHOCYTES # BLD AUTO: 1.34 K/UL (ref 1–4.8)
LYMPHOCYTES NFR BLD: 21 % (ref 22–41)
MCH RBC QN AUTO: 29.7 PG (ref 27–33)
MCHC RBC AUTO-ENTMCNC: 34.1 G/DL (ref 32.3–36.5)
MCV RBC AUTO: 87.2 FL (ref 81.4–97.8)
MONOCYTES # BLD AUTO: 0.47 K/UL (ref 0–0.85)
MONOCYTES NFR BLD AUTO: 7.4 % (ref 0–13.4)
NEUTROPHILS # BLD AUTO: 4.41 K/UL (ref 1.82–7.42)
NEUTROPHILS NFR BLD: 69.1 % (ref 44–72)
NRBC # BLD AUTO: 0 K/UL
NRBC BLD-RTO: 0 /100 WBC (ref 0–0.2)
PLATELET # BLD AUTO: 206 K/UL (ref 164–446)
PMV BLD AUTO: 10.7 FL (ref 9–12.9)
POTASSIUM SERPL-SCNC: 4 MMOL/L (ref 3.6–5.5)
PROT SERPL-MCNC: 6.8 G/DL (ref 6–8.2)
PSA SERPL-MCNC: 1.26 NG/ML (ref 0–4)
RBC # BLD AUTO: 5.85 M/UL (ref 4.7–6.1)
SODIUM SERPL-SCNC: 141 MMOL/L (ref 135–145)
TRIGL SERPL-MCNC: 102 MG/DL (ref 0–149)
WBC # BLD AUTO: 6.4 K/UL (ref 4.8–10.8)

## 2023-07-12 PROCEDURE — 80053 COMPREHEN METABOLIC PANEL: CPT

## 2023-07-12 PROCEDURE — 85025 COMPLETE CBC W/AUTO DIFF WBC: CPT

## 2023-07-12 PROCEDURE — 83036 HEMOGLOBIN GLYCOSYLATED A1C: CPT

## 2023-07-12 PROCEDURE — 36415 COLL VENOUS BLD VENIPUNCTURE: CPT

## 2023-07-12 PROCEDURE — 84153 ASSAY OF PSA TOTAL: CPT

## 2023-07-12 PROCEDURE — 80061 LIPID PANEL: CPT

## 2023-09-03 ENCOUNTER — NON-PROVIDER VISIT (OUTPATIENT)
Dept: CARDIOLOGY | Facility: MEDICAL CENTER | Age: 68
End: 2023-09-03
Payer: MEDICARE

## 2023-09-03 PROCEDURE — 93294 REM INTERROG EVL PM/LDLS PM: CPT | Performed by: INTERNAL MEDICINE

## 2023-09-05 NOTE — CARDIAC REMOTE MONITOR - SCAN
Device transmission reviewed. Device demonstrated appropriate function.       Electronically Signed by: Erik Chatterjee M.D.    9/11/2023  8:54 PM

## 2023-10-16 ENCOUNTER — APPOINTMENT (RX ONLY)
Dept: URBAN - METROPOLITAN AREA CLINIC 35 | Facility: CLINIC | Age: 68
Setting detail: DERMATOLOGY
End: 2023-10-16

## 2023-10-16 DIAGNOSIS — L24.4 IRRITANT CONTACT DERMATITIS DUE TO DRUGS IN CONTACT WITH SKIN: ICD-10-CM

## 2023-10-16 DIAGNOSIS — L57.0 ACTINIC KERATOSIS: ICD-10-CM

## 2023-10-16 DIAGNOSIS — Z71.89 OTHER SPECIFIED COUNSELING: ICD-10-CM

## 2023-10-16 DIAGNOSIS — D22 MELANOCYTIC NEVI: ICD-10-CM

## 2023-10-16 DIAGNOSIS — D18.0 HEMANGIOMA: ICD-10-CM

## 2023-10-16 DIAGNOSIS — L81.4 OTHER MELANIN HYPERPIGMENTATION: ICD-10-CM

## 2023-10-16 DIAGNOSIS — L82.1 OTHER SEBORRHEIC KERATOSIS: ICD-10-CM

## 2023-10-16 DIAGNOSIS — D485 NEOPLASM OF UNCERTAIN BEHAVIOR OF SKIN: ICD-10-CM

## 2023-10-16 PROBLEM — D22.61 MELANOCYTIC NEVI OF RIGHT UPPER LIMB, INCLUDING SHOULDER: Status: ACTIVE | Noted: 2023-10-16

## 2023-10-16 PROBLEM — D48.5 NEOPLASM OF UNCERTAIN BEHAVIOR OF SKIN: Status: ACTIVE | Noted: 2023-10-16

## 2023-10-16 PROBLEM — D18.01 HEMANGIOMA OF SKIN AND SUBCUTANEOUS TISSUE: Status: ACTIVE | Noted: 2023-10-16

## 2023-10-16 PROCEDURE — ? LIQUID NITROGEN

## 2023-10-16 PROCEDURE — 99213 OFFICE O/P EST LOW 20 MIN: CPT | Mod: 25

## 2023-10-16 PROCEDURE — ? BIOPSY BY SHAVE METHOD

## 2023-10-16 PROCEDURE — ? ADDITIONAL NOTES

## 2023-10-16 PROCEDURE — 17000 DESTRUCT PREMALG LESION: CPT | Mod: 59

## 2023-10-16 PROCEDURE — ? COUNSELING

## 2023-10-16 PROCEDURE — 11102 TANGNTL BX SKIN SINGLE LES: CPT

## 2023-10-16 ASSESSMENT — LOCATION DETAILED DESCRIPTION DERM
LOCATION DETAILED: RIGHT POSTERIOR SHOULDER
LOCATION DETAILED: SUPRAPUBIC SKIN
LOCATION DETAILED: RIGHT ANTERIOR DISTAL THIGH
LOCATION DETAILED: RIGHT VENTRAL DISTAL FOREARM
LOCATION DETAILED: RIGHT SUPERIOR CRUS OF ANTIHELIX

## 2023-10-16 ASSESSMENT — LOCATION ZONE DERM
LOCATION ZONE: LEG
LOCATION ZONE: EAR
LOCATION ZONE: ARM
LOCATION ZONE: TRUNK

## 2023-10-16 ASSESSMENT — LOCATION SIMPLE DESCRIPTION DERM
LOCATION SIMPLE: RIGHT EAR
LOCATION SIMPLE: RIGHT THIGH
LOCATION SIMPLE: GROIN
LOCATION SIMPLE: RIGHT FOREARM
LOCATION SIMPLE: RIGHT SHOULDER

## 2023-10-16 NOTE — PROCEDURE: LIQUID NITROGEN
Number Of Freeze-Thaw Cycles: 1 freeze-thaw cycle
Show Aperture Variable?: Yes
Duration Of Freeze Thaw-Cycle (Seconds): 10
Application Tool (Optional): Cry-AC
Render Note In Bullet Format When Appropriate: No
Consent: The patient's consent was obtained including but not limited to risks of crusting, scabbing, blistering, scarring, darker or lighter pigmentary change, recurrence, incomplete removal and infection.
Detail Level: Detailed
Post-Care Instructions: I reviewed with the patient in detail post-care instructions. Patient is to wear sunprotection, and avoid picking at any of the treated lesions. Pt may apply Vaseline to crusted or scabbing areas.

## 2023-10-16 NOTE — HPI: FULL BODY SKIN EXAMINATION
How Severe Are Your Spot(S)?: mild
What Type Of Note Output Would You Prefer (Optional)?: Standard Output
What Is The Reason For Today's Visit?: Full Body Skin Examination
What Is The Reason For Today's Visit? (Being Monitored For X): concerning skin lesions on a periodic basis
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 Also, blisters on the areas on his right ventral forearm where the urethane floats made contact with his right ventral forearm.

## 2023-10-17 ENCOUNTER — NON-PROVIDER VISIT (OUTPATIENT)
Dept: CARDIOLOGY | Facility: MEDICAL CENTER | Age: 68
End: 2023-10-17
Attending: NURSE PRACTITIONER
Payer: MEDICARE

## 2023-10-17 VITALS
HEIGHT: 73 IN | WEIGHT: 219 LBS | OXYGEN SATURATION: 100 % | SYSTOLIC BLOOD PRESSURE: 126 MMHG | HEART RATE: 66 BPM | BODY MASS INDEX: 29.03 KG/M2 | DIASTOLIC BLOOD PRESSURE: 72 MMHG | RESPIRATION RATE: 12 BRPM

## 2023-10-17 DIAGNOSIS — I44.2 AV BLOCK, 3RD DEGREE (HCC): ICD-10-CM

## 2023-10-17 DIAGNOSIS — Z95.0 CARDIAC PACEMAKER IN SITU: ICD-10-CM

## 2023-10-17 PROCEDURE — 93280 PM DEVICE PROGR EVAL DUAL: CPT | Mod: 26 | Performed by: NURSE PRACTITIONER

## 2023-10-17 PROCEDURE — 99211 OFF/OP EST MAY X REQ PHY/QHP: CPT | Performed by: NURSE PRACTITIONER

## 2023-10-17 ASSESSMENT — FIBROSIS 4 INDEX: FIB4 SCORE: 1.32

## 2023-10-17 NOTE — PROGRESS NOTES
Device is working normally. No mode switching episodes.  Normal sensing and capture of RA and RV leads; stable impedances. Battery longevity is 3.0 years.     Changes today include decreasing RA output to 2.0V at 0.4ms.    He will be due to see Dr. Murray in February 2025.    Follow-up in 6 months for next PM check with me.

## 2023-11-03 ENCOUNTER — TELEPHONE (OUTPATIENT)
Dept: HEALTH INFORMATION MANAGEMENT | Facility: OTHER | Age: 68
End: 2023-11-03
Payer: MEDICARE

## 2023-12-03 ENCOUNTER — NON-PROVIDER VISIT (OUTPATIENT)
Dept: CARDIOLOGY | Facility: MEDICAL CENTER | Age: 68
End: 2023-12-03
Payer: MEDICARE

## 2023-12-04 NOTE — CARDIAC REMOTE MONITOR - SCAN
Device transmission reviewed. Device demonstrated appropriate function.       Electronically Signed by: Erik Chatterjee M.D.    12/5/2023  10:55 AM

## 2023-12-05 PROCEDURE — 93294 REM INTERROG EVL PM/LDLS PM: CPT | Performed by: INTERNAL MEDICINE

## 2024-03-03 ENCOUNTER — NON-PROVIDER VISIT (OUTPATIENT)
Dept: CARDIOLOGY | Facility: MEDICAL CENTER | Age: 69
End: 2024-03-03
Payer: MEDICARE

## 2024-03-03 PROCEDURE — 93294 REM INTERROG EVL PM/LDLS PM: CPT | Performed by: INTERNAL MEDICINE

## 2024-03-04 DIAGNOSIS — I10 ESSENTIAL HYPERTENSION, BENIGN: ICD-10-CM

## 2024-03-04 NOTE — CARDIAC REMOTE MONITOR - SCAN
Device transmission reviewed. Device demonstrated appropriate function.       Electronically Signed by: Nani Chahal M.D.    3/16/2024  2:06 PM

## 2024-03-05 RX ORDER — RAMIPRIL 10 MG/1
CAPSULE ORAL
Qty: 200 CAPSULE | Refills: 1 | Status: SHIPPED | OUTPATIENT
Start: 2024-03-05

## 2024-03-05 NOTE — TELEPHONE ENCOUNTER
Is the patient due for a refill? Yes    Was the patient seen the past year? No    Date of last office visit: 2/9/23    Does the patient have an upcoming appointment?  Yes   If yes, When? 4/16/24    Provider to refill:LENORA    Does the patients insurance require a 100 day supply?  Yes

## 2024-03-23 DIAGNOSIS — E78.5 DYSLIPIDEMIA: ICD-10-CM

## 2024-03-25 RX ORDER — ROSUVASTATIN CALCIUM 10 MG/1
10 TABLET, COATED ORAL DAILY
Qty: 100 TABLET | Refills: 0 | Status: SHIPPED | OUTPATIENT
Start: 2024-03-25

## 2024-03-25 NOTE — TELEPHONE ENCOUNTER
Is the patient due for a refill? Yes    Was the patient seen the past year? No    Date of last office visit: 2/9/23    Does the patient have an upcoming appointment?  Yes   If yes, When? 4/15/24    Provider to refill:LENORA    Does the patients insurance require a 100 day supply?  Yes

## 2024-04-15 ENCOUNTER — NON-PROVIDER VISIT (OUTPATIENT)
Dept: CARDIOLOGY | Facility: MEDICAL CENTER | Age: 69
End: 2024-04-15
Attending: NURSE PRACTITIONER
Payer: MEDICARE

## 2024-04-15 VITALS
HEIGHT: 73 IN | HEART RATE: 60 BPM | BODY MASS INDEX: 30.09 KG/M2 | RESPIRATION RATE: 14 BRPM | WEIGHT: 227 LBS | SYSTOLIC BLOOD PRESSURE: 118 MMHG | OXYGEN SATURATION: 98 % | DIASTOLIC BLOOD PRESSURE: 74 MMHG

## 2024-04-15 DIAGNOSIS — Z95.0 CARDIAC PACEMAKER IN SITU: ICD-10-CM

## 2024-04-15 DIAGNOSIS — I44.2 AV BLOCK, 3RD DEGREE (HCC): ICD-10-CM

## 2024-04-15 PROCEDURE — 93288 INTERROG EVL PM/LDLS PM IP: CPT | Mod: 26 | Performed by: NURSE PRACTITIONER

## 2024-04-15 PROCEDURE — 93288 INTERROG EVL PM/LDLS PM IP: CPT | Performed by: NURSE PRACTITIONER

## 2024-04-15 ASSESSMENT — FIBROSIS 4 INDEX: FIB4 SCORE: 1.32

## 2024-04-15 NOTE — PROGRESS NOTES
Device is working normally. No mode switching episodes.  Normal sensing of RA lead; unable to measure R waves. Stable capture of RA and RV leads; stable impedances. Battery longevity is 2.5 years.  No changes are made today.    Follow-up in 6 months for next PM check with me.    He is also due for annual follow-up with Dr. Murray.

## 2024-05-23 DIAGNOSIS — E78.5 DYSLIPIDEMIA: ICD-10-CM

## 2024-05-23 RX ORDER — ROSUVASTATIN CALCIUM 10 MG/1
10 TABLET, COATED ORAL DAILY
Qty: 100 TABLET | Refills: 0 | Status: SHIPPED | OUTPATIENT
Start: 2024-05-23

## 2024-06-02 ENCOUNTER — NON-PROVIDER VISIT (OUTPATIENT)
Dept: CARDIOLOGY | Facility: MEDICAL CENTER | Age: 69
End: 2024-06-02
Payer: MEDICARE

## 2024-06-03 PROCEDURE — 93294 REM INTERROG EVL PM/LDLS PM: CPT | Performed by: INTERNAL MEDICINE

## 2024-06-03 NOTE — CARDIAC REMOTE MONITOR - SCAN
Device transmission reviewed. Device demonstrated appropriate function.       Electronically Signed by: Man Mejia M.D.    6/3/2024  11:51 AM

## 2024-09-01 ENCOUNTER — NON-PROVIDER VISIT (OUTPATIENT)
Dept: CARDIOLOGY | Facility: MEDICAL CENTER | Age: 69
End: 2024-09-01
Payer: MEDICARE

## 2024-09-01 DIAGNOSIS — I10 ESSENTIAL HYPERTENSION, BENIGN: ICD-10-CM

## 2024-09-01 PROCEDURE — 93294 REM INTERROG EVL PM/LDLS PM: CPT | Performed by: INTERNAL MEDICINE

## 2024-09-03 RX ORDER — RAMIPRIL 10 MG/1
CAPSULE ORAL
Qty: 200 CAPSULE | Refills: 0 | Status: SHIPPED | OUTPATIENT
Start: 2024-09-03

## 2024-09-03 NOTE — CARDIAC REMOTE MONITOR - SCAN
Device transmission reviewed. Device demonstrated appropriate function.       Electronically Signed by: Nani Chahal M.D.    9/5/2024  12:01 PM

## 2024-09-03 NOTE — TELEPHONE ENCOUNTER
Is the patient due for a refill? Yes    Was the patient seen the past year? No    Date of last office visit: 2/9/2023    Does the patient have an upcoming appointment?  Yes   If yes, When? 10/22/2024    Provider to refill:LENORA    Does the patient have [facility] and need 100-day supply? (This applies to ALL medications) Yes, quantity updated to 100 days

## 2024-09-03 NOTE — TELEPHONE ENCOUNTER
FV scheduled 10/22/24.   RX refilled for 100 days.   BMP ordered per protocol, notified via gopogohart to complete prior to FV.

## 2024-10-16 ENCOUNTER — APPOINTMENT (RX ONLY)
Dept: URBAN - METROPOLITAN AREA CLINIC 35 | Facility: CLINIC | Age: 69
Setting detail: DERMATOLOGY
End: 2024-10-16

## 2024-10-16 DIAGNOSIS — L81.4 OTHER MELANIN HYPERPIGMENTATION: ICD-10-CM

## 2024-10-16 DIAGNOSIS — L82.1 OTHER SEBORRHEIC KERATOSIS: ICD-10-CM

## 2024-10-16 DIAGNOSIS — Z71.89 OTHER SPECIFIED COUNSELING: ICD-10-CM

## 2024-10-16 DIAGNOSIS — D22 MELANOCYTIC NEVI: ICD-10-CM

## 2024-10-16 DIAGNOSIS — D18.0 HEMANGIOMA: ICD-10-CM

## 2024-10-16 DIAGNOSIS — L57.0 ACTINIC KERATOSIS: ICD-10-CM

## 2024-10-16 PROBLEM — D22.61 MELANOCYTIC NEVI OF RIGHT UPPER LIMB, INCLUDING SHOULDER: Status: ACTIVE | Noted: 2024-10-16

## 2024-10-16 PROBLEM — D18.01 HEMANGIOMA OF SKIN AND SUBCUTANEOUS TISSUE: Status: ACTIVE | Noted: 2024-10-16

## 2024-10-16 PROCEDURE — 99213 OFFICE O/P EST LOW 20 MIN: CPT | Mod: 25

## 2024-10-16 PROCEDURE — ? LIQUID NITROGEN

## 2024-10-16 PROCEDURE — 17000 DESTRUCT PREMALG LESION: CPT

## 2024-10-16 PROCEDURE — 17003 DESTRUCT PREMALG LES 2-14: CPT

## 2024-10-16 PROCEDURE — ? COUNSELING

## 2024-10-16 ASSESSMENT — LOCATION ZONE DERM
LOCATION ZONE: FACE
LOCATION ZONE: ARM
LOCATION ZONE: TRUNK

## 2024-10-16 ASSESSMENT — LOCATION SIMPLE DESCRIPTION DERM
LOCATION SIMPLE: LEFT TEMPLE
LOCATION SIMPLE: GROIN
LOCATION SIMPLE: LEFT ZYGOMA
LOCATION SIMPLE: RIGHT SHOULDER

## 2024-10-16 ASSESSMENT — LOCATION DETAILED DESCRIPTION DERM
LOCATION DETAILED: LEFT CENTRAL ZYGOMA
LOCATION DETAILED: SUPRAPUBIC SKIN
LOCATION DETAILED: RIGHT POSTERIOR SHOULDER
LOCATION DETAILED: LEFT CENTRAL TEMPLE

## 2024-10-16 NOTE — PROCEDURE: LIQUID NITROGEN
Post-Care Instructions: I reviewed with the patient in detail post-care instructions. Patient is to wear sunprotection, and avoid picking at any of the treated lesions. Pt may apply Vaseline to crusted or scabbing areas.
Consent: The patient's consent was obtained including but not limited to risks of crusting, scabbing, blistering, scarring, darker or lighter pigmentary change, recurrence, incomplete removal and infection.
Show Aperture Variable?: Yes
Render Note In Bullet Format When Appropriate: No
Detail Level: Detailed
Number Of Freeze-Thaw Cycles: 1 freeze-thaw cycle
Duration Of Freeze Thaw-Cycle (Seconds): 10

## 2024-10-22 ENCOUNTER — NON-PROVIDER VISIT (OUTPATIENT)
Dept: CARDIOLOGY | Facility: MEDICAL CENTER | Age: 69
End: 2024-10-22
Attending: NURSE PRACTITIONER
Payer: MEDICARE

## 2024-10-22 VITALS
WEIGHT: 219 LBS | SYSTOLIC BLOOD PRESSURE: 124 MMHG | HEIGHT: 73 IN | HEART RATE: 60 BPM | OXYGEN SATURATION: 98 % | DIASTOLIC BLOOD PRESSURE: 72 MMHG | RESPIRATION RATE: 12 BRPM | BODY MASS INDEX: 29.03 KG/M2

## 2024-10-22 DIAGNOSIS — Z95.0 CARDIAC PACEMAKER IN SITU: ICD-10-CM

## 2024-10-22 DIAGNOSIS — I44.2 AV BLOCK, 3RD DEGREE (HCC): ICD-10-CM

## 2024-10-22 PROCEDURE — 93288 INTERROG EVL PM/LDLS PM IP: CPT | Mod: 26 | Performed by: NURSE PRACTITIONER

## 2024-10-22 ASSESSMENT — FIBROSIS 4 INDEX: FIB4 SCORE: 1.34

## 2024-10-23 ENCOUNTER — TELEPHONE (OUTPATIENT)
Dept: HEALTH INFORMATION MANAGEMENT | Facility: OTHER | Age: 69
End: 2024-10-23
Payer: MEDICARE

## 2024-11-20 ENCOUNTER — HOSPITAL ENCOUNTER (OUTPATIENT)
Dept: LAB | Facility: MEDICAL CENTER | Age: 69
End: 2024-11-20
Attending: INTERNAL MEDICINE
Payer: MEDICARE

## 2024-11-20 DIAGNOSIS — I10 ESSENTIAL HYPERTENSION, BENIGN: ICD-10-CM

## 2024-11-20 LAB
ANION GAP SERPL CALC-SCNC: 10 MMOL/L (ref 7–16)
BUN SERPL-MCNC: 16 MG/DL (ref 8–22)
CALCIUM SERPL-MCNC: 9.2 MG/DL (ref 8.4–10.2)
CHLORIDE SERPL-SCNC: 103 MMOL/L (ref 96–112)
CO2 SERPL-SCNC: 27 MMOL/L (ref 20–33)
CREAT SERPL-MCNC: 0.94 MG/DL (ref 0.5–1.4)
FASTING STATUS PATIENT QL REPORTED: NORMAL
GFR SERPLBLD CREATININE-BSD FMLA CKD-EPI: 87 ML/MIN/1.73 M 2
GLUCOSE SERPL-MCNC: 112 MG/DL (ref 65–99)
POTASSIUM SERPL-SCNC: 4.2 MMOL/L (ref 3.6–5.5)
SODIUM SERPL-SCNC: 140 MMOL/L (ref 135–145)

## 2024-11-20 PROCEDURE — 80048 BASIC METABOLIC PNL TOTAL CA: CPT

## 2024-11-20 PROCEDURE — 36415 COLL VENOUS BLD VENIPUNCTURE: CPT

## 2024-12-01 ENCOUNTER — NON-PROVIDER VISIT (OUTPATIENT)
Dept: CARDIOLOGY | Facility: MEDICAL CENTER | Age: 69
End: 2024-12-01
Payer: MEDICARE

## 2024-12-01 PROCEDURE — 93294 REM INTERROG EVL PM/LDLS PM: CPT | Performed by: STUDENT IN AN ORGANIZED HEALTH CARE EDUCATION/TRAINING PROGRAM

## 2024-12-02 NOTE — CARDIAC REMOTE MONITOR - SCAN
Device transmission reviewed. Device demonstrated appropriate function.       Electronically Signed by: Tran Brown MD, PhD    12/6/2024  4:38 PM

## 2024-12-05 DIAGNOSIS — E78.5 DYSLIPIDEMIA: ICD-10-CM

## 2024-12-05 DIAGNOSIS — I10 ESSENTIAL HYPERTENSION, BENIGN: ICD-10-CM

## 2024-12-05 NOTE — TELEPHONE ENCOUNTER
Is the patient due for a refill? Yes    Was the patient seen the past year? No    Date of last office visit: 2/9/23    Does the patient have an upcoming appointment?  Yes   If yes, When? 12/13/24    Provider to refill:LENORA    Does the patient have [facility] and need 100-day supply? (This applies to ALL medications) Yes, quantity updated to 100 days

## 2024-12-06 RX ORDER — RAMIPRIL 10 MG/1
CAPSULE ORAL
Qty: 60 CAPSULE | Refills: 0 | Status: SHIPPED | OUTPATIENT
Start: 2024-12-06

## 2024-12-06 RX ORDER — ROSUVASTATIN CALCIUM 10 MG/1
10 TABLET, COATED ORAL DAILY
Qty: 30 TABLET | Refills: 0 | Status: SHIPPED | OUTPATIENT
Start: 2024-12-06

## 2025-01-01 DIAGNOSIS — E78.5 DYSLIPIDEMIA: ICD-10-CM

## 2025-01-01 DIAGNOSIS — I10 ESSENTIAL HYPERTENSION, BENIGN: ICD-10-CM

## 2025-01-05 RX ORDER — ROSUVASTATIN CALCIUM 10 MG/1
TABLET, COATED ORAL
Qty: 90 TABLET | Refills: 1 | Status: SHIPPED | OUTPATIENT
Start: 2025-01-05

## 2025-01-05 RX ORDER — RAMIPRIL 10 MG/1
CAPSULE ORAL
Qty: 180 CAPSULE | Refills: 1 | Status: SHIPPED | OUTPATIENT
Start: 2025-01-05

## 2025-02-20 ENCOUNTER — TELEPHONE (OUTPATIENT)
Dept: CARDIOLOGY | Facility: MEDICAL CENTER | Age: 70
End: 2025-02-20
Payer: MEDICARE

## 2025-02-20 DIAGNOSIS — I10 ESSENTIAL HYPERTENSION, BENIGN: ICD-10-CM

## 2025-02-20 DIAGNOSIS — E78.5 DYSLIPIDEMIA: ICD-10-CM

## 2025-03-02 ENCOUNTER — NON-PROVIDER VISIT (OUTPATIENT)
Dept: CARDIOLOGY | Facility: MEDICAL CENTER | Age: 70
End: 2025-03-02
Payer: MEDICARE

## 2025-03-03 PROCEDURE — 93294 REM INTERROG EVL PM/LDLS PM: CPT | Performed by: STUDENT IN AN ORGANIZED HEALTH CARE EDUCATION/TRAINING PROGRAM

## 2025-03-04 NOTE — CARDIAC REMOTE MONITOR - SCAN
Device transmission reviewed. Device demonstrated appropriate function.       Electronically Signed by: Tran Brown MD, PhD    3/12/2025  1:09 PM

## 2025-03-19 ENCOUNTER — TELEPHONE (OUTPATIENT)
Dept: CARDIOLOGY | Facility: MEDICAL CENTER | Age: 70
End: 2025-03-19
Payer: MEDICARE

## 2025-03-19 DIAGNOSIS — E78.5 DYSLIPIDEMIA: ICD-10-CM

## 2025-03-19 DIAGNOSIS — I10 ESSENTIAL HYPERTENSION, BENIGN: ICD-10-CM

## 2025-03-19 RX ORDER — ROSUVASTATIN CALCIUM 10 MG/1
10 TABLET, COATED ORAL DAILY
Qty: 100 TABLET | Refills: 1 | Status: SHIPPED | OUTPATIENT
Start: 2025-03-19

## 2025-03-19 RX ORDER — RAMIPRIL 10 MG/1
10 CAPSULE ORAL 2 TIMES DAILY
Qty: 200 CAPSULE | Refills: 2 | Status: SHIPPED | OUTPATIENT
Start: 2025-03-19

## 2025-03-19 NOTE — PROGRESS NOTES
"[facility]    Pt has seen cardiology in past 12 months and in need of prescription refills per protocol.  A 100 day supply is provided whenever possible to improve adherence rates.     Lab Results   Component Value Date/Time    HBA1C 5.6 07/12/2023 10:13 AM      No results found for: \"MICROALBCALC\", \"MALBCRT\", \"MALBEXCR\", \"UANXSL26\", \"MICROALBUR\", \"MICRALB\", \"UMICROALBUM\", \"MICROALBTIM\"   Lab Results   Component Value Date/Time    ALKPHOSPHAT 94 07/12/2023 10:13 AM    ASTSGOT 20 07/12/2023 10:13 AM    ALTSGPT 25 07/12/2023 10:13 AM    TBILIRUBIN 0.7 07/12/2023 10:13 AM        Meds refilled:  Ramipril  Rosuvastatin    Pt wants meds mailed.  He needs future appointment for additional refills (pt missed last apt)    Ha Luz, PharmD     CC  Dr Murray       "

## 2025-03-19 NOTE — TELEPHONE ENCOUNTER
Last OV 2/9/23, missed last appointment with LENORA. Needs new FV. Courtesy refills provided by Pharmacist for SW 3/19/25. Latest Lipid profile 7/12/23, new orders placed. Reminders sent via [employer].    To Scheduling. Please contact patient to schedule next follow-up appointment. Thank you.

## 2025-03-20 ENCOUNTER — TELEPHONE (OUTPATIENT)
Dept: CARDIOLOGY | Facility: MEDICAL CENTER | Age: 70
End: 2025-03-20
Payer: MEDICARE

## 2025-04-02 PROCEDURE — RXMED WILLOW AMBULATORY MEDICATION CHARGE: Performed by: INTERNAL MEDICINE

## 2025-04-03 ENCOUNTER — PHARMACY VISIT (OUTPATIENT)
Dept: PHARMACY | Facility: MEDICAL CENTER | Age: 70
End: 2025-04-03
Payer: COMMERCIAL

## 2025-04-18 ENCOUNTER — HOSPITAL ENCOUNTER (OUTPATIENT)
Facility: MEDICAL CENTER | Age: 70
End: 2025-04-18
Attending: NURSE PRACTITIONER
Payer: MEDICARE

## 2025-04-18 DIAGNOSIS — E78.5 DYSLIPIDEMIA: ICD-10-CM

## 2025-04-18 LAB
CHOLEST SERPL-MCNC: 115 MG/DL (ref 100–199)
FASTING STATUS PATIENT QL REPORTED: NORMAL
HDLC SERPL-MCNC: 43 MG/DL
LDLC SERPL CALC-MCNC: 58 MG/DL
TRIGL SERPL-MCNC: 71 MG/DL (ref 0–149)

## 2025-04-18 PROCEDURE — 80061 LIPID PANEL: CPT

## 2025-04-18 PROCEDURE — 36415 COLL VENOUS BLD VENIPUNCTURE: CPT

## 2025-04-21 ENCOUNTER — RESULTS FOLLOW-UP (OUTPATIENT)
Dept: CARDIOLOGY | Facility: MEDICAL CENTER | Age: 70
End: 2025-04-21
Payer: MEDICARE

## 2025-04-22 ENCOUNTER — NON-PROVIDER VISIT (OUTPATIENT)
Dept: CARDIOLOGY | Facility: MEDICAL CENTER | Age: 70
End: 2025-04-22
Attending: NURSE PRACTITIONER
Payer: MEDICARE

## 2025-04-22 VITALS
DIASTOLIC BLOOD PRESSURE: 90 MMHG | WEIGHT: 217 LBS | RESPIRATION RATE: 16 BRPM | BODY MASS INDEX: 28.76 KG/M2 | HEART RATE: 65 BPM | SYSTOLIC BLOOD PRESSURE: 124 MMHG | OXYGEN SATURATION: 98 % | HEIGHT: 73 IN

## 2025-04-22 DIAGNOSIS — I44.2 AV BLOCK, 3RD DEGREE (HCC): ICD-10-CM

## 2025-04-22 DIAGNOSIS — Z95.0 CARDIAC PACEMAKER IN SITU: ICD-10-CM

## 2025-04-22 PROCEDURE — 93288 INTERROG EVL PM/LDLS PM IP: CPT | Mod: 26 | Performed by: NURSE PRACTITIONER

## 2025-04-22 RX ORDER — SILDENAFIL 50 MG/1
TABLET, FILM COATED ORAL
COMMUNITY
Start: 2025-03-28 | End: 2025-04-30

## 2025-04-22 ASSESSMENT — FIBROSIS 4 INDEX: FIB4 SCORE: 1.34

## 2025-04-22 NOTE — PROGRESS NOTES
Device is working normally. No mode switching episodes.  Normal sensing and capture of RA and RV leads; stable impedances. Battery longevity is years.  No changes are made today.    He does have follow-up next week with Dr. Garcia to establish care (former patient of Dr. Murray).    Follow-up in 6 months for next PM check with me.    HCC Gap Form    Diagnosis to address: I44.2 - AV block, 3rd degree (HCC)  Assessment and plan: Chronic, stable. Continue with current defined treatment plan: PM working normally. Follow-up at least annually.  Last edited 04/22/25 11:25 PDT by MELISSA Holbrook

## 2025-04-30 ENCOUNTER — OFFICE VISIT (OUTPATIENT)
Dept: CARDIOLOGY | Facility: MEDICAL CENTER | Age: 70
End: 2025-04-30
Attending: INTERNAL MEDICINE
Payer: MEDICARE

## 2025-04-30 VITALS
SYSTOLIC BLOOD PRESSURE: 138 MMHG | DIASTOLIC BLOOD PRESSURE: 70 MMHG | BODY MASS INDEX: 28.49 KG/M2 | RESPIRATION RATE: 16 BRPM | WEIGHT: 215 LBS | OXYGEN SATURATION: 97 % | HEART RATE: 67 BPM | HEIGHT: 73 IN

## 2025-04-30 DIAGNOSIS — N52.9 ERECTILE DYSFUNCTION, UNSPECIFIED ERECTILE DYSFUNCTION TYPE: ICD-10-CM

## 2025-04-30 DIAGNOSIS — N52.8 OTHER MALE ERECTILE DYSFUNCTION: ICD-10-CM

## 2025-04-30 DIAGNOSIS — I44.2 AV BLOCK, 3RD DEGREE (HCC): ICD-10-CM

## 2025-04-30 DIAGNOSIS — E78.5 DYSLIPIDEMIA: ICD-10-CM

## 2025-04-30 DIAGNOSIS — I10 ESSENTIAL HYPERTENSION, BENIGN: ICD-10-CM

## 2025-04-30 PROCEDURE — 99213 OFFICE O/P EST LOW 20 MIN: CPT | Performed by: INTERNAL MEDICINE

## 2025-04-30 RX ORDER — TADALAFIL 20 MG/1
20 TABLET ORAL PRN
Qty: 30 TABLET | Refills: 3 | Status: SHIPPED | OUTPATIENT
Start: 2025-04-30

## 2025-04-30 RX ORDER — TADALAFIL 20 MG/1
20 TABLET ORAL PRN
Qty: 30 TABLET | Refills: 3 | Status: SHIPPED | OUTPATIENT
Start: 2025-04-30 | End: 2025-04-30

## 2025-04-30 ASSESSMENT — FIBROSIS 4 INDEX: FIB4 SCORE: 1.34

## 2025-04-30 NOTE — PROGRESS NOTES
[facility]    PatientName:Sanya ShepherdmitzDate: 2025  :1955    69 y.o.PCP:Aurelio ASHFORD M.D.  MRN:[id number]        Problems and Plans    AV block, 3rd degree (HCC)  Noted history of complete heart block ultimately undergoing dual-chamber permanent pacemaker in which she is followed closely in the device clinic.  No changes were made to his medical device at this time.  His overall reported findings were reviewed however EGM tracings were not available during time of this follow-up    Dyslipidemia  Recommended ongoing statin therapy and integration of healthy lifestyle    Essential hypertension, benign  Blood pressures currently well-controlled on his current regimen no changes    Other male erectile dysfunction  Regarding his erectile dysfunction he has not seen significant improvement with Viagra therapy however at this time we will trial Cialis 20 mg as needed 30 to 40 minutes prior to sexual activity.  If he is successful with Cialis getting the desired response it would not be unreasonable consider transitioning him to Cialis daily at 10 mg.  He will continue to work closely with his primary care provider not unreasonable consider urology referral if further persistence of erectile dysfunction despite use of medications    Return in about 1 year (around 2026).      Encounter    Reason for Visit / Chief Complaint: Hypertension    HPI    69-year-old male with known history of hypertension, dyslipidemia, complete heart block status post dual-chamber permanent pacemaker in 2017 ([employer] MRI compatible dual-chamber permanent pacemaker) presents in clinic for ongoing management of his hypertension and dyslipidemia.    Currently, he notes he is feeling well and not having active cardiovascular complaints.  He is followed closely in device clinic and notes that he may need to have a battery change within the next year.  His main noncardiovascular  concern is that he is still suffering from erectile dysfunction and recently started Viagra therapy at 100 mg prior to sexual engagement and questions if his blood pressure medications may be contributing to his erectile dysfunction.      Past Medical History  Past Medical History:   Diagnosis Date    Atypical chest pain 05/2019    ER visit for chest pain. MPI negative for ischemia or infarct, LVEF 52%.    AV block, 3rd degree (HCC) 09/2017    Status post PM implantation    Hyperlipidemia     Hypertension      Past Surgical History  Past Surgical History:   Procedure Laterality Date    PACEMAKER INSERTION Left 09/2017    [employer] MRI 2272 implanted by Dr. Chahal.     Social History  Social History     Socioeconomic History    Marital status:      Spouse name: Not on file    Number of children: Not on file    Years of education: Not on file    Highest education level: Not on file   Occupational History    Not on file   Tobacco Use    Smoking status: Never    Smokeless tobacco: Never   Vaping Use    Vaping status: Never Used   Substance and Sexual Activity    Alcohol use: Yes     Alcohol/week: 6.0 oz     Types: 5 Glasses of wine, 5 Cans of beer per week     Comment: 5 days a week    Drug use: No    Sexual activity: Not on file   Other Topics Concern    Not on file   Social History Narrative    Not on file     Social Drivers of Health     Financial Resource Strain: Not on file   Food Insecurity: Not on file   Transportation Needs: Not on file   Physical Activity: Not on file   Stress: Not on file   Social Connections: Not on file   Intimate Partner Violence: Not on file   Housing Stability: Not on file     Past Family History  Family History   Problem Relation Age of Onset    Heart Disease Mother         CABG at age late 60    Hypertension Mother     Heart Disease Father         CABG at age 83    Hypertension Father      Medication(s)    Current Outpatient Medications:     tadalafil, 20 mg, Oral,  "PRN, Taking As Needed    ramipril, 10 mg, Oral, BID, Taking    rosuvastatin, 10 mg, Oral, DAILY, Taking    Cetirizine HCl (ZYRTEC ALLERGY PO), 1 Tablet, Oral, PRN, Taking    aspirin, 81 mg, Oral, DAILY, Taking  Allergies  Sulfa drugs    Review of Systems    A comprehensive 10 system review was conducted and is negative except as noted above in the HPI or here.      Vital Signs  /70 (BP Location: Left arm, Patient Position: Sitting, BP Cuff Size: Adult)   Pulse 67   Resp 16   Ht 1.854 m (6' 1\")   Wt 97.5 kg (215 lb)   SpO2 97%   BMI 28.37 kg/m²     Physical Exam  Constitutional:       Appearance: Normal appearance.   HENT:      Head: Normocephalic and atraumatic.      Mouth/Throat:      Mouth: Mucous membranes are moist.      Pharynx: Oropharynx is clear.   Eyes:      Extraocular Movements: Extraocular movements intact.      Conjunctiva/sclera: Conjunctivae normal.   Cardiovascular:      Rate and Rhythm: Normal rate and regular rhythm.      Pulses: Normal pulses.      Heart sounds: Normal heart sounds. No murmur heard.     No friction rub. No gallop.   Pulmonary:      Effort: Pulmonary effort is normal.      Breath sounds: Normal breath sounds.   Abdominal:      General: Bowel sounds are normal.      Palpations: Abdomen is soft.   Musculoskeletal:         General: Normal range of motion.      Cervical back: Normal range of motion and neck supple.   Skin:     General: Skin is warm and dry.   Neurological:      General: No focal deficit present.      Mental Status: He is alert and oriented to person, place, and time. Mental status is at baseline.   Psychiatric:         Mood and Affect: Mood normal.         Behavior: Behavior normal.         Thought Content: Thought content normal.         Judgment: Judgment normal.         No results found for: \"TSHULTRASEN\"   No results found for: \"FREET4\"     Lab Results   Component Value Date/Time    HBA1C 5.6 07/12/2023 10:13 AM       Lab Results   Component Value " Date/Time    CHOLSTRLTOT 115 04/18/2025 12:09 PM    LDL 58 04/18/2025 12:09 PM    HDL 43 04/18/2025 12:09 PM    TRIGLYCERIDE 71 04/18/2025 12:09 PM         Lab Results   Component Value Date/Time    SODIUM 140 11/20/2024 10:23 AM    POTASSIUM 4.2 11/20/2024 10:23 AM    CHLORIDE 103 11/20/2024 10:23 AM    CO2 27 11/20/2024 10:23 AM    GLUCOSE 112 (H) 11/20/2024 10:23 AM    BUN 16 11/20/2024 10:23 AM    CREATININE 0.94 11/20/2024 10:23 AM    BUNCREATRAT 21 02/17/2016 10:20 AM       Lab Results   Component Value Date/Time    ALKPHOSPHAT 94 07/12/2023 10:13 AM    ASTSGOT 20 07/12/2023 10:13 AM    ALTSGPT 25 07/12/2023 10:13 AM    TBILIRUBIN 0.7 07/12/2023 10:13 AM           Total patient time was estimated to be 30 minutes consisting of chart review, direct patient interaction, medication renewal, plan development and overall communication with the cardiovascular team.        Electronically signed by:   John Paul Garcia DO, MPH  [facility]    Portions of this note were completed using voice recognition software (Dragon Naturally speaking software) . Occasional transcription errors may have escaped proof reading. I have made every reasonable attempt to correct obvious errors, but I expect that there are errors of grammar and possibly content that I did not discover before finalizing the note.

## 2025-04-30 NOTE — ASSESSMENT & PLAN NOTE
Regarding his erectile dysfunction he has not seen significant improvement with Viagra therapy however at this time we will trial Cialis 20 mg as needed 30 to 40 minutes prior to sexual activity.  If he is successful with Cialis getting the desired response it would not be unreasonable consider transitioning him to Cialis daily at 10 mg.  He will continue to work closely with his primary care provider not unreasonable consider urology referral if further persistence of erectile dysfunction despite use of medications

## 2025-04-30 NOTE — PATIENT INSTRUCTIONS
Follow up in 12 months  Pacer check as scheduled  Stop Viagra  Start Cialis 20mg as needed prior to sexual activity.  Continue current medications  Call with questions

## 2025-04-30 NOTE — ASSESSMENT & PLAN NOTE
Noted history of complete heart block ultimately undergoing dual-chamber permanent pacemaker in which she is followed closely in the device clinic.  No changes were made to his medical device at this time.  His overall reported findings were reviewed however EGM tracings were not available during time of this follow-up

## 2025-05-13 ENCOUNTER — PATIENT MESSAGE (OUTPATIENT)
Dept: CARDIOLOGY | Facility: MEDICAL CENTER | Age: 70
End: 2025-05-13
Payer: MEDICARE

## 2025-05-13 DIAGNOSIS — N52.9 ERECTILE DYSFUNCTION, UNSPECIFIED ERECTILE DYSFUNCTION TYPE: Primary | ICD-10-CM

## 2025-05-13 DIAGNOSIS — N52.8 OTHER MALE ERECTILE DYSFUNCTION: ICD-10-CM

## 2025-05-14 NOTE — PATIENT COMMUNICATION
To BD: Please advise, see [employer] message. Would you like to place order for referral to urology? Thank you.

## 2025-05-16 NOTE — Clinical Note
REFERRAL APPROVAL NOTICE         Sent on May 16, 2025                   Bharath Miranda  [address] [place] [address]                   Dear Hair Miranda,    After a careful review of the medical information and benefit coverage, [facility] has processed your referral. See below for additional details.    If applicable, you must be actively enrolled with your insurance for coverage of the authorized service. If you have any questions regarding your coverage, please contact your insurance directly.    REFERRAL INFORMATION   Referral #:  [id number]  Referred-To Department    Referred-By Provider:  Urology    John Paul Garcia D.O.   [facility]      [address]  [address]  [place] [address]  [phone] [address] [facility] [address]  [place] [address]  [phone]    Referral Start Date:  05/15/2025  Referral End Date:   05/15/2026             SCHEDULING  If you do not already have an appointment, please call [phone] to make an appointment.     MORE INFORMATION  If you do not already have a [employer] account, sign up at: [email or web address]  You can access your medical information, make appointments, see lab results, billing information, and more.  If you have questions regarding this referral, please contact  the [facility] Referrals department at:             [phone]. Monday - Friday 8:00AM - 5:00PM.     Sincerely,    [facility]

## 2025-06-01 ENCOUNTER — NON-PROVIDER VISIT (OUTPATIENT)
Dept: CARDIOLOGY | Facility: MEDICAL CENTER | Age: 70
End: 2025-06-01
Payer: MEDICARE

## 2025-06-03 NOTE — CARDIAC REMOTE MONITOR - SCAN
Device transmission reviewed. Device demonstrated appropriate function.       Electronically Signed by: Tran Brown MD, PhD    6/4/2025  1:52 PM

## 2025-06-17 ENCOUNTER — OFFICE VISIT (OUTPATIENT)
Dept: UROLOGY | Facility: MEDICAL CENTER | Age: 70
End: 2025-06-17
Payer: MEDICARE

## 2025-06-17 DIAGNOSIS — N52.9 ERECTILE DYSFUNCTION OF ORGANIC ORIGIN: Primary | ICD-10-CM

## 2025-06-17 PROCEDURE — 99203 OFFICE O/P NEW LOW 30 MIN: CPT | Performed by: UROLOGY

## 2025-06-17 NOTE — PROGRESS NOTES
Chief Complaint: Erectile dysfunction    HPI: Sanya Miradna is a 70 y.o. male with a history of hypertension, hyperlipidemia, and 3rd degree AV block treated with implantation of a permanent pacemaker, referred for treatment of erectile dysfunction.     He has had difficulty with erectile function for at least the last few years; when he first noted difficulty he was caring for his late-wife through cancer treatments. More recently he has a new partner, and has been treated with both sildenafil and tadalafil. With 20 mg tadalafil he's able to get a 5/10 erectile rigidity, but this has not been sufficient for penetrative intercourse. He is able to reach orgasm with other forms of stimulation. He is interested in alternative treatment options.     His libido is normal. Has never had a testosterone level assessed.     Etiology of ED (check all that apply): Vascular  Prostate cancer treatment: N/A  ED Year of onset: 2021  Treatments tried (all that apply): PDE5 inhibitors  Curvature of erections:no  Previously implanted/explanted? no  Libido: Normal  Urinary frequency: no  Nocturia: no  Leakage of urine: no  UTI: none    MALINA: 5    Past Medical History:  Past Medical History[1]    Past Surgical History:  Past Surgical History[2]    Family History:  Family History   Problem Relation Age of Onset    Heart Disease Mother         CABG at age late 60    Hypertension Mother     Heart Disease Father         CABG at age 83    Hypertension Father        Social History:  Social History     Socioeconomic History    Marital status:      Spouse name: Not on file    Number of children: Not on file    Years of education: Not on file    Highest education level: Not on file   Occupational History    Not on file   Tobacco Use    Smoking status: Never    Smokeless tobacco: Never   Vaping Use    Vaping status: Never Used   Substance and Sexual Activity    Alcohol use: Yes     Alcohol/week: 6.0 oz     Types: 5 Glasses of  "wine, 5 Cans of beer per week     Comment: 5 days a week    Drug use: No    Sexual activity: Not on file   Other Topics Concern    Not on file   Social History Narrative    Not on file     Social Drivers of Health     Financial Resource Strain: Not on file   Food Insecurity: Not on file   Transportation Needs: Not on file   Physical Activity: Not on file   Stress: Not on file   Social Connections: Not on file   Intimate Partner Violence: Not on file   Housing Stability: Not on file       Medications:  Current Medications[3]    Allergies:  Allergies[4]    Review of Systems:  Constitutional: Negative for fever, chills and malaise/fatigue.   HENT: Negative for congestion.    Eyes: Negative for pain.   Respiratory: Negative for cough and shortness of breath.    Cardiovascular: Negative for leg swelling.   Gastrointestinal: Negative for nausea, vomiting, abdominal pain and diarrhea.   Genitourinary: Negative for dysuria and hematuria.   Skin: Negative for rash.   Neurological: Negative for dizziness, focal weakness and headaches.   Endo/Heme/Allergies: Does not bruise/bleed easily.   Psychiatric/Behavioral: Negative for depression.  The patient is not nervous/anxious.        Physical Exam:  There were no vitals filed for this visit.    GENERAL: well appearing, well nourished, NAD  RESP: respiratory effort normal  SKIN/LYMPH: normal coloration and turgor, no suspicious skin lesions noted  NEURO/PSYCH: alert, oriented, normal speech, no focal findings or movement disorder noted  EXTREMITIES: no pedal edema noted    Data Review:    Labs:  POCT UA No results found for: \"POCCOLOR\", \"POCAPPEAR\", \"POCLEUKEST\", \"POCNITRITE\", \"POCUROBILIGE\", \"POCPROTEIN\", \"POCURPH\", \"POCBLOOD\", \"POCSPGRV\", \"POCKETONES\", \"POCBILIRUBIN\", \"POCGLUCUA\"   CBC   Lab Results   Component Value Date/Time    WBC 6.4 07/12/2023 1013    RBC 5.85 07/12/2023 1013    HEMOGLOBIN 17.4 07/12/2023 1013    HEMATOCRIT 51.0 07/12/2023 1013    MCV 87.2 07/12/2023 1013 " "   MCH 29.7 07/12/2023 1013    MCHC 34.1 07/12/2023 1013    RDW 41.1 07/12/2023 1013    MPV 10.7 07/12/2023 1013    LYMPHOCYTES 21.00 (L) 07/12/2023 1013    LYMPHS 1.34 07/12/2023 1013    MONOCYTES 7.40 07/12/2023 1013    MONOS 0.47 07/12/2023 1013    EOSINOPHILS 1.10 07/12/2023 1013    EOS 0.07 07/12/2023 1013    BASOPHILS 1.10 07/12/2023 1013    BASO 0.07 07/12/2023 1013    NRBC 0.00 07/12/2023 1013       CMP   Lab Results   Component Value Date/Time    SODIUM 140 11/20/2024 1023    POTASSIUM 4.2 11/20/2024 1023    CHLORIDE 103 11/20/2024 1023    CO2 27 11/20/2024 1023    ANION 10.0 11/20/2024 1023    GLUCOSE 112 (H) 11/20/2024 1023    BUN 16 11/20/2024 1023    CREATININE 0.94 11/20/2024 1023    GFRCKD 87 11/20/2024 1023    CALCIUM 9.2 11/20/2024 1023    CORRCALC 9.0 07/12/2023 1013    ASTSGOT 20 07/12/2023 1013    ALTSGPT 25 07/12/2023 1013    ALKPHOSPHAT 94 07/12/2023 1013    TBILIRUBIN 0.7 07/12/2023 1013    ALBUMIN 4.3 07/12/2023 1013    TOTPROTEIN 6.8 07/12/2023 1013    GLOBULIN 2.5 07/12/2023 1013    AGRATIO 1.7 07/12/2023 1013     INFERTILITY No results found for: \"FSH\", \"LH\", \"PROLACT\", \"ESTRADL\", \"TESTOSTERONE\", \"FREETESTOST\", \"TESTLCMS\", \"SEXHORM\"  PSA   Lab Results   Component Value Date/Time    PSATOTAL 1.26 07/12/2023 1013       Assessment: 70 y.o. male with a history of progressive erectile dysfunction, likely arterial in nature given a prior diagnosis of mild CAD/coronary calcifications.     I discussed with the patient the diagnosis of erectile dysfunction and how it may relate to his overall health. I counseled the patient that ED can be a marker of underlying cardiovascular disease and encouraged close follow up with his primary care physician. Likewise, I explained that lifestyle modifications including improved diet and increased exercise can improve not only his overall health but also potentially his erectile function.    We also discussed management options beyond lifestyle modification. I " explained the use of a vacuum erection device (BHAVESH) including its appropriate use. We discussed the use of oral phosphodiesterase inhibitors, including sildenafil, tadalafil, and vardenafil. I explained how to appropriately take these medications for maximum effect, as well as potential side effects. Sildenafil should be taken on an empty stomach about an hour before sexual activity, and side effects may include headache, GI upset, vision changes including a blue-tinge, and facial flushing. Tadalafil may be taken up to 24 hours prior to sexual activity but has its peak effect within the first few hours and should be taken about 30 minutes prior to sexual activity; it can be taken with or without food. Side effects can also include headache and indigestion, as well as back pain and myalgias. Vardenafil is a short acting PDE5 inhibitor and should be taken 30-60 minutes prior to expected intercourse. Side effects may include headache, flushing, visual disturbances, and dyspepsia.     We also discussed more invasive methods of treatment, including intraurethral alprostadil and intracavernosal injection therapy (ICI). I explained to the patient that if he would like to consider either of these options, we would need to arrange for an in-office test dose. With both medications we discussed the possible side effects of penile pain and priapism.     Finally, we discussed surgical therapy with implantation of a penile prosthesis. We discussed both malleable and inflatable penile prosthesis, including the nature of the surgery, expected outcomes, and the risks of surgery including infection, erosion, urethral injury, and device malfunction.    Inflatable Penile Prosthesis    The inflatable penile prosthesis is a surgical implant used to treat erectile dysfunction. Generally this procedure is used for patients that do not respond to, or do not tolerate, less invasive treatments (oral medications, urethral suppositories, vacuum  devices or injection therapy), though some patients may opt for the operation prior to using those less invasive alternatives. This implant has been in use for decades and has a long track record of safety and efficacy. Patient satisfaction rates are approximately 90%. The prosthesis will allow you to have an erection suitable for sexual activity on demand. In the deflated position the penis will have a normal flaccid appearance.    This surgery is generally performed on an outpatient basis with a 23-hour admission under general or spinal anesthesia; some patients prefer and are able to be discharged home the same day as surgery. The procedure usually takes about an hour. It is performed through a small incision in the scrotum. Two fluid-filled cylinders are placed in the erectile tubes of the penis (one on each side, called corpora cavernosa). A reservoir (balloon) that holds the fluid for the system is placed in the pelvis through the same incision or through a second small incision in your lower abdomen. A control pump that operates the device is placed in the scrotum under the skin between the testicles. Squeezing the pump transfers fluid from the reservoir into the cylinders to produce an erection. There is a separate button on the control pump used to deflate the cylinders.      Overall patient satisfaction rates are 90%. There are certain features of the device that are important to consider.  Although the cylinders expand when inflated, the erection that patients get with a prosthesis is generally smaller than their natural erection prior to having erectile dysfunction. Unfortunately, most men with long-standing erectile dysfunction lose penile length due to loss of normal elasticity of the erectile tubes (corpora cavernosa), and this cannot be corrected with surgery. The erectile bodies in some men do not extend fully into the tip of the penis. This is actually a separate compartment that normally fills  with blood in men who do not have ED. If the cylinders do not extend far enough into the tip of the penis, the head of the penis may “droop” somewhat with erections. This rarely produces difficulty with sexual activity, but may rarely require a second revision surgery for correction. Complications of the procedure include skin infection, bleeding, prosthetic infection (1%), mechanical failure (the average device lasts 8-10 years), rare extrusion of the device (erodes through the skin), perforation of the urethra or erectile body during surgery (usually the surgery will be aborted), and rare cases of damage to the bowel, bladder or large blood vessel during placement of the reservoir balloon (very rare). Occasionally patients will complain of penile pain, either with the device inflated or in the deflated position. This seems to be more common in diabetic patients. Very rarely, a patient will ask to have the device removed because of pain/discomfort. Infections are also more common in diabetic patients and patients with spinal cord injuries.     This operation is essentially irreversible. The device can be removed, but patients will generally be unable to have erections with any other form of treatment after having had a prosthesis. If the implant needs to be removed for any reason, patients will likely need to have it replaced to have erections in the future.      Intracavernosal Injections    This handout explains what intracavernosal injections are and how they are used to help men achieve erections suitable for sex.                   What are intracavernosal injections and how do they work?    Intracavernosal injections are a way for men with more severe erectile dysfunction to achieve an erection suitable for sex.    Medications that are designed to manage erectile dysfunction work by prompting the penile arteries to dilate. This allows for sufficient blood flow into the corpora cavernosa to provide an erection.  Unfortunately, oral medications like sildenafil (Viagra) and tadalafil (Cialis) do not work for everyone, can have intolerable side effects, or interact with other medications such that they must be avoided.      Intracavernosal injections contain medications that are similar to oral medications in function, but are much more potent.  There are many different formulations of intracavernosal injections, each with different names. Some common names include Bimix, Trimix, and Quadmix. Although they are usually not covered by insurance, they are usually quite affordable from compounding pharmacies.    What are the benefits of intracavernosal injections?    Intracavernosal injections are a great option for men with who wish to regain sexual function but do not desire (or are not candidates for) a more lasting solution like the penile implant.     This means they can be a great option for men too ill to undergo medical procedures or for men who are still in the first few months of recovery following prostate removal for prostate cancer.    Intracavernosal injections are also more potent than oral medications like sildenafil (Viagra) and tadalafil (Cialis), which means that they can work for men who have failed those options. They also do not have the same systemic side effects like headaches, muscle aches, visual changes or heartburn.    What are the drawbacks of intracavernosal injections?    Although intracavernosal injections are generally considered to be safe, it is important to be aware of potential side effects.     Intracavernosal injections must be performed on an as-needed basis. This means that men must perform a penile injection every time they wish to achieve an erection. They also must be kept in the refrigerator as the medication quickly deteriorates at room temperature. These two facts mean that injections are not very spontaneous, which can be a challenge for many couples.     Although it takes some time  to develop, men who use injections for several months or longer are at risk for developing scar tissue in the penis known as fibrosis. This can make the injections more difficult to perform. In some cases, men can even develop Peyronie's Disease and penile curvature as the result of their injections.    It's important to remember that injections are not a cure for erectile dysfunction and do not stop the progressive loss of penile length and girth cause by it. On average, men with significant erectile dysfunction will lose about 1 inch of penile length each year. The only solution that stops this ongoing loss and allows men to recover their some of their size is the penile implant.    Some men may notice a slight burning with their injection. This may depend on the type of medication being used and can occasionally persist for several minutes. Men should also be careful to only inject along the side of the penile shaft, as the urine tube (the urethra) runs at the 6 o'clock position and the nerves that supply the head of the penis run at the 12 o'clock position. Detailed instructions on injection technique are provided below.    Occasionally, the injections may work too well and cause a prolonged erection that can be painful known as a priapism. Although uncommon, this is defined as an erection lasting longer than 4 hours and is considered to be a medical emergency.    Generally, we recommend that patients seek care well before the 4-hour som. Men experiencing a prolonged erection lasting 1 full hour should take 120 mg of over-the-counter non-extended release pseudoephedrine hydrochloride (aka Sudafed). Men starting injection therapy should keep a supply of this medication on-hand just in case. If the erection has not resolved in 30 minutes (1.5 hours since it first started), we recommend that men seek assistance urgently. If during business hours, we recommend that men call our office to see if one of our providers  are available to see you. If outside of business hours or if isn't possible to reach [facility] at [facility], we recommend going to the closest emergency room.                   How should I perform the injections?    The injection should be given directly into penile shaft at the 3 o'clock or 9 o'clock position. You do not want to give injection directly on top (12 o'clock) or bottom (6 o'clock). Please give the injection mid-shaft and avoid the head of your penis (see figure below).    Prepare the medication as directed. Some pharmacies will require mixing sterile water with the medication powder while others will pre-mix the medication.  Using the provided needle and syringe, draw up your prescribed dose of medication and replace the needle cap.  Grasp the head of your penis, not the skin. If you are not circumcised, pull your foreskin back before grasping the head of your penis. Pull your penis straight out.  Locate the area to be injected (see above). Wipe it with an alcohol swab.  Remove the cap covering the needle. Double check the syringe to make sure the dose is correct and you haven't pushed any medication out by accident. Hold the syringe like a pen or a dart. Do not place your index finger or thumb on the plunger until the needle is all the way in the skin.   Once again, grasp the head of your penis with your thumb at the 12 o'clock position and pull it straight out. You must keep tension on your penis; do not twist it since this could lead to injecting the wrong area.  Touch the needle to the skin and gently slide it into the shaft of your penis. Make sure to avoid any veins.  Make sure to insert the needle at a slight angle and push it all the way in (See figure above).  Push down on the plunger to-inject the medication into the shaft of your penis. Be careful not to pull the syringe out as you are injecting the medication.  Remove the needle after you have injected all the  medication. Pull it straight out. Do not use a twisting or jerking motion because this may cause bruising. Apply pressure, if bleeding, for 1 to 2 minutes with your thumb on the injection site and your index finger on the opposite side of your penis. If you are taking a blood thinner or aspirin, may need to hold longer.  Place the syringe into a sharps container. If you do not have a sharps container, you may use an empty plastic laundry detergent container. These can be disposed of safely at most major pharmacies.  We recommend alternating sides and injection location with each injection.    How do I find my ideal dose?    Finding your optimal intracavernosal injection dose can take some trial and error. For men that are new to intracavernosal injections, we recommend starting at 0.1 cc or 10 units (depending on syringe given by the pharmacy). If your response is insufficient, you may increase your dosing by 0.05 cc or 5 units as needed in order to achieve the desired effect (a firm erection lasting no longer than one hour). You should never perform more than one injection every 48 hours and you should never use a dose higher than one full syringe (100 units or 1.0 cc). Increasing your dose by too much or performing more than 1 injection every 48 hours significantly increases your chance of developing a priapism (prolonged, painful erection).    How do I get started with intracavernosal injections?    If you are interested in starting intracavernosal injections and haven't yet discussed it with your provider or haven't been prescribed any medication yet, please call our office at [phone] to schedule an appointment or (if you're already an established patient) request a prescription and injection teaching appointment.    Our office primarily uses injectable medication that are made by high quality compounding pharmacies. We've not been able to identify any local compounding pharmacies that produce  intracavernosal injections, so the pharmacies [place] all [place]. Your provider will inform you where your script has been sent. The details for each pharmacy are located below:    [facility]   Located in [place], [place].  Phone: [phone]  Website: [email or web address]  Hours: Monday-Friday 8:30 AM - 8:30  PM EST  Scripts are mailed directly to patients. Patients should receive a phone call shortly after their script has been sent in to confirm payment details and shipping address.  [facility]  Located in [place], [place].  Phone: [phone]  Website: [email or web address]   Hours: Monday-Friday 8 AM - 9 PM EST  Scripts are mailed directly to patients. Patients should receive a phone call shortly after their script has been sent in to confirm payment details and shipping address.    All men starting intracavernosal injections for the first time must attend an injection teaching appointment with our office team. After receiving your medications, please bring the following with you to your injection teaching appointment:    Medication  Syringes with needles  Cooler with a cold pack to transport the medication after you leave (MEDICATION MUST BE KEPT REFRIGERATED ONCE MIXED)  A supply of over-the-counter non-extended release pseudoephedrine hydrochloride (aka Sudafed).    Some final reminders…    Do not perform more than one injection at a time. You should wait at least 48 hours between injections. Do not increase your dose by more than by 0.1 cc or 10 units at a time.  Always make sure to keep a supply of over-the-counter non-extended release pseudoephedrine hydrochloride (aka Sudafed) readily available just in case.  If you have a firm erection lasting longer than one hour, please take 120 mg of oral pseudoephedrine. If your erection has not resolved in 30 minutes (1.5 hours after starting) please call our office at [phone] and plan to see us in clinic. If it is after hours, please report to your nearest  emergency room.      Plan:    -Morning serum testosterone; will call or message with results  -Continue tadalafil 20 mg PO as needed  -Can consider use of a penile constriction ring to improve maintenance of erections (though won't improve overall rigidity)  -Can consider use of intracavernosal injections, vacuum erection device, or penile prosthetic surgery as detailed above. Not currently interested, but can return at any time if that changes for further discussion and treatment.       Byron Al MD         [1]   Past Medical History:  Diagnosis Date    Atypical chest pain 05/2019    ER visit for chest pain. MPI negative for ischemia or infarct, LVEF 52%.    AV block, 3rd degree (HCC) 09/2017    Status post PM implantation    Hyperlipidemia     Hypertension    [2]   Past Surgical History:  Procedure Laterality Date    PACEMAKER INSERTION Left 09/2017    [employer] MRI 2272 implanted by Dr. Chahal.   [3]   Current Outpatient Medications   Medication Sig Dispense Refill    tadalafil (CIALIS) 20 MG tablet Take 1 Tablet by mouth as needed for Erectile Dysfunction. 30 Tablet 3    ramipril (ALTACE) 10 MG capsule Take 1 Capsule by mouth 2 times a day. 200 Capsule 2    rosuvastatin (CRESTOR) 10 MG Tab Take 1 Tablet by mouth every day. Pt needs next apt for additional refills. 100 Tablet 1    Cetirizine HCl (ZYRTEC ALLERGY PO) Take 1 Tab by mouth as needed.      aspirin 81 MG tablet Take 81 mg by mouth every day.       No current facility-administered medications for this visit.   [4]   Allergies  Allergen Reactions    Sulfa Drugs Hives

## 2025-06-18 ENCOUNTER — HOSPITAL ENCOUNTER (OUTPATIENT)
Facility: MEDICAL CENTER | Age: 70
End: 2025-06-18
Attending: UROLOGY
Payer: MEDICARE

## 2025-06-18 DIAGNOSIS — N52.9 ERECTILE DYSFUNCTION OF ORGANIC ORIGIN: ICD-10-CM

## 2025-06-18 LAB — TESTOST SERPL-MCNC: 362 NG/DL (ref 175–781)

## 2025-06-18 PROCEDURE — 84403 ASSAY OF TOTAL TESTOSTERONE: CPT

## 2025-06-18 PROCEDURE — 36415 COLL VENOUS BLD VENIPUNCTURE: CPT

## 2025-07-21 PROCEDURE — RXMED WILLOW AMBULATORY MEDICATION CHARGE: Performed by: INTERNAL MEDICINE

## 2025-07-22 ENCOUNTER — PHARMACY VISIT (OUTPATIENT)
Dept: PHARMACY | Facility: MEDICAL CENTER | Age: 70
End: 2025-07-22
Payer: COMMERCIAL